# Patient Record
Sex: FEMALE | Race: WHITE | Employment: OTHER | URBAN - METROPOLITAN AREA
[De-identification: names, ages, dates, MRNs, and addresses within clinical notes are randomized per-mention and may not be internally consistent; named-entity substitution may affect disease eponyms.]

---

## 2017-03-20 ENCOUNTER — TRANSFERRED RECORDS (OUTPATIENT)
Dept: HEALTH INFORMATION MANAGEMENT | Facility: CLINIC | Age: 72
End: 2017-03-20

## 2017-03-25 DIAGNOSIS — I10 ESSENTIAL HYPERTENSION WITH GOAL BLOOD PRESSURE LESS THAN 140/90: ICD-10-CM

## 2017-03-28 ENCOUNTER — MYC MEDICAL ADVICE (OUTPATIENT)
Dept: FAMILY MEDICINE | Facility: CLINIC | Age: 72
End: 2017-03-28

## 2017-03-28 DIAGNOSIS — I10 ESSENTIAL HYPERTENSION WITH GOAL BLOOD PRESSURE LESS THAN 140/90: ICD-10-CM

## 2017-03-29 RX ORDER — LOSARTAN POTASSIUM 50 MG/1
50 TABLET ORAL DAILY
Qty: 90 TABLET | Refills: 0 | Status: SHIPPED | OUTPATIENT
Start: 2017-03-29 | End: 2017-07-06

## 2017-03-30 RX ORDER — LOSARTAN POTASSIUM 50 MG/1
TABLET ORAL
Qty: 90 TABLET | Refills: 1 | OUTPATIENT
Start: 2017-03-30

## 2017-03-30 NOTE — TELEPHONE ENCOUNTER
Refused back as it was filled through [employer] request.    Cely Osei RN, [facility] Triage

## 2017-04-04 RX ORDER — AMLODIPINE BESYLATE 5 MG/1
5 TABLET ORAL DAILY
Qty: 1 TABLET | Refills: 0 | COMMUNITY
Start: 2017-04-04 | End: 2017-08-14

## 2017-07-06 ENCOUNTER — MYC MEDICAL ADVICE (OUTPATIENT)
Dept: FAMILY MEDICINE | Facility: CLINIC | Age: 72
End: 2017-07-06

## 2017-07-06 DIAGNOSIS — I10 ESSENTIAL HYPERTENSION WITH GOAL BLOOD PRESSURE LESS THAN 140/90: ICD-10-CM

## 2017-07-06 NOTE — TELEPHONE ENCOUNTER
losartan (COZAAR) 50 MG tablet      Last Written Prescription Date: 3/29/17  Last Fill Quantity: 90, # refills: 0  Last Office Visit with G, P or [facility] prescribing provider: 11/8/16       Potassium   Date Value Ref Range Status   09/01/2016 3.9 3.4 - 5.3 mmol/L Final     Creatinine   Date Value Ref Range Status   09/01/2016 0.77 0.52 - 1.04 mg/dL Final     BP Readings from Last 3 Encounters:   11/08/16 126/70   09/08/16 124/82   04/07/16 128/74

## 2017-07-10 RX ORDER — LOSARTAN POTASSIUM 50 MG/1
50 TABLET ORAL DAILY
Qty: 30 TABLET | Refills: 0 | Status: SHIPPED | OUTPATIENT
Start: 2017-07-10 | End: 2017-08-14

## 2017-07-10 NOTE — TELEPHONE ENCOUNTER
Medication is being filled for 1 time refill only due to:  overdue for a wellness exam in March   Eileen Calderon RN

## 2017-08-04 ENCOUNTER — DOCUMENTATION ONLY (OUTPATIENT)
Dept: LAB | Facility: CLINIC | Age: 72
End: 2017-08-04

## 2017-08-04 DIAGNOSIS — E78.5 HYPERLIPIDEMIA LDL GOAL <100: ICD-10-CM

## 2017-08-04 DIAGNOSIS — D50.9 IRON DEFICIENCY ANEMIA, UNSPECIFIED IRON DEFICIENCY ANEMIA TYPE: ICD-10-CM

## 2017-08-04 DIAGNOSIS — I10 HYPERTENSION GOAL BP (BLOOD PRESSURE) < 140/90: ICD-10-CM

## 2017-08-04 DIAGNOSIS — R79.89 ELEVATED TSH: Primary | ICD-10-CM

## 2017-08-08 DIAGNOSIS — D50.9 IRON DEFICIENCY ANEMIA, UNSPECIFIED IRON DEFICIENCY ANEMIA TYPE: ICD-10-CM

## 2017-08-08 DIAGNOSIS — E78.5 HYPERLIPIDEMIA LDL GOAL <100: ICD-10-CM

## 2017-08-08 DIAGNOSIS — R79.89 ELEVATED TSH: ICD-10-CM

## 2017-08-08 DIAGNOSIS — I10 HYPERTENSION GOAL BP (BLOOD PRESSURE) < 140/90: ICD-10-CM

## 2017-08-08 LAB
ALBUMIN SERPL-MCNC: 4.1 G/DL (ref 3.4–5)
ALBUMIN UR-MCNC: NEGATIVE MG/DL
ALP SERPL-CCNC: 52 U/L (ref 40–150)
ALT SERPL W P-5'-P-CCNC: 40 U/L (ref 0–50)
ANION GAP SERPL CALCULATED.3IONS-SCNC: 9 MMOL/L (ref 3–14)
APPEARANCE UR: CLEAR
AST SERPL W P-5'-P-CCNC: 23 U/L (ref 0–45)
BACTERIA #/AREA URNS HPF: ABNORMAL /HPF
BILIRUB SERPL-MCNC: 0.6 MG/DL (ref 0.2–1.3)
BILIRUB UR QL STRIP: NEGATIVE
BUN SERPL-MCNC: 15 MG/DL (ref 7–30)
CALCIUM SERPL-MCNC: 9.3 MG/DL (ref 8.5–10.1)
CHLORIDE SERPL-SCNC: 108 MMOL/L (ref 94–109)
CHOLEST SERPL-MCNC: 152 MG/DL
CO2 SERPL-SCNC: 23 MMOL/L (ref 20–32)
COLOR UR AUTO: YELLOW
CREAT SERPL-MCNC: 0.79 MG/DL (ref 0.52–1.04)
CREAT UR-MCNC: 106 MG/DL
ERYTHROCYTE [DISTWIDTH] IN BLOOD BY AUTOMATED COUNT: 14.6 % (ref 10–15)
GFR SERPL CREATININE-BSD FRML MDRD: 72 ML/MIN/1.7M2
GLUCOSE SERPL-MCNC: 110 MG/DL (ref 70–99)
GLUCOSE UR STRIP-MCNC: NEGATIVE MG/DL
HCT VFR BLD AUTO: 40.2 % (ref 35–47)
HDLC SERPL-MCNC: 55 MG/DL
HGB BLD-MCNC: 13.5 G/DL (ref 11.7–15.7)
HGB UR QL STRIP: NEGATIVE
KETONES UR STRIP-MCNC: NEGATIVE MG/DL
LDLC SERPL CALC-MCNC: 72 MG/DL
LEUKOCYTE ESTERASE UR QL STRIP: ABNORMAL
MCH RBC QN AUTO: 29.4 PG (ref 26.5–33)
MCHC RBC AUTO-ENTMCNC: 33.6 G/DL (ref 31.5–36.5)
MCV RBC AUTO: 88 FL (ref 78–100)
MICROALBUMIN UR-MCNC: 15 MG/L
MICROALBUMIN/CREAT UR: 14.53 MG/G CR (ref 0–25)
NITRATE UR QL: NEGATIVE
NONHDLC SERPL-MCNC: 97 MG/DL
PH UR STRIP: 5.5 PH (ref 5–7)
PLATELET # BLD AUTO: 225 10E9/L (ref 150–450)
POTASSIUM SERPL-SCNC: 3.9 MMOL/L (ref 3.4–5.3)
PROT SERPL-MCNC: 7 G/DL (ref 6.8–8.8)
RBC # BLD AUTO: 4.59 10E12/L (ref 3.8–5.2)
RBC #/AREA URNS AUTO: ABNORMAL /HPF (ref 0–2)
SODIUM SERPL-SCNC: 140 MMOL/L (ref 133–144)
SP GR UR STRIP: 1.01 (ref 1–1.03)
T4 FREE SERPL-MCNC: 1.09 NG/DL (ref 0.76–1.46)
TRIGL SERPL-MCNC: 124 MG/DL
TSH SERPL DL<=0.005 MIU/L-ACNC: 4.48 MU/L (ref 0.4–4)
URN SPEC COLLECT METH UR: ABNORMAL
UROBILINOGEN UR STRIP-ACNC: 0.2 EU/DL (ref 0.2–1)
WBC # BLD AUTO: 5.2 10E9/L (ref 4–11)
WBC #/AREA URNS AUTO: ABNORMAL /HPF (ref 0–2)

## 2017-08-08 PROCEDURE — 85027 COMPLETE CBC AUTOMATED: CPT | Performed by: FAMILY MEDICINE

## 2017-08-08 PROCEDURE — 82043 UR ALBUMIN QUANTITATIVE: CPT | Performed by: FAMILY MEDICINE

## 2017-08-08 PROCEDURE — 80061 LIPID PANEL: CPT | Performed by: FAMILY MEDICINE

## 2017-08-08 PROCEDURE — 84439 ASSAY OF FREE THYROXINE: CPT | Performed by: FAMILY MEDICINE

## 2017-08-08 PROCEDURE — 36415 COLL VENOUS BLD VENIPUNCTURE: CPT | Performed by: FAMILY MEDICINE

## 2017-08-08 PROCEDURE — 84443 ASSAY THYROID STIM HORMONE: CPT | Performed by: FAMILY MEDICINE

## 2017-08-08 PROCEDURE — 80053 COMPREHEN METABOLIC PANEL: CPT | Performed by: FAMILY MEDICINE

## 2017-08-08 PROCEDURE — 81001 URINALYSIS AUTO W/SCOPE: CPT | Performed by: FAMILY MEDICINE

## 2017-08-09 ENCOUNTER — MYC MEDICAL ADVICE (OUTPATIENT)
Dept: FAMILY MEDICINE | Facility: CLINIC | Age: 72
End: 2017-08-09

## 2017-08-10 NOTE — PROGRESS NOTES
"Your urine testing does not show any elevated protein.  There are small changes that can be a sign of early infection.  Please send me a message if you are having symptoms.  If not we will plan a culture when you are in for your appointment.  Your thyroid testing is similar to back in 2014 which shows possible early underactive thyroid.  Recheck of this with upcoming appointment is recommended.  Your blood sugar is borderline elevated.  This is in the \"prediabetic\" range.  Exercise and limiting carbohydrate and sugars in diet can be helpful at preventing progression to diabetes.  Your kidney function is normal.   Liver testing is normal.  Your cholesterol is under good control.   Blood cell counts are normal as well.  Please call or MyChart message me if you have any questions.    PSK"

## 2017-08-14 ENCOUNTER — OFFICE VISIT (OUTPATIENT)
Dept: FAMILY MEDICINE | Facility: CLINIC | Age: 72
End: 2017-08-14
Payer: COMMERCIAL

## 2017-08-14 VITALS
DIASTOLIC BLOOD PRESSURE: 78 MMHG | HEART RATE: 71 BPM | BODY MASS INDEX: 40.06 KG/M2 | TEMPERATURE: 98.1 F | HEIGHT: 56 IN | OXYGEN SATURATION: 96 % | WEIGHT: 178.1 LBS | SYSTOLIC BLOOD PRESSURE: 128 MMHG

## 2017-08-14 DIAGNOSIS — E78.5 HYPERLIPIDEMIA LDL GOAL <100: ICD-10-CM

## 2017-08-14 DIAGNOSIS — Z23 NEED FOR TDAP VACCINATION: ICD-10-CM

## 2017-08-14 DIAGNOSIS — Z00.00 MEDICARE ANNUAL WELLNESS VISIT, SUBSEQUENT: Primary | ICD-10-CM

## 2017-08-14 DIAGNOSIS — B37.2 CANDIDAL INTERTRIGO: ICD-10-CM

## 2017-08-14 DIAGNOSIS — Z12.83 SKIN CANCER SCREENING: ICD-10-CM

## 2017-08-14 DIAGNOSIS — E66.01 MORBID OBESITY DUE TO EXCESS CALORIES (H): ICD-10-CM

## 2017-08-14 DIAGNOSIS — I10 HYPERTENSION GOAL BP (BLOOD PRESSURE) < 140/90: ICD-10-CM

## 2017-08-14 PROCEDURE — 90471 IMMUNIZATION ADMIN: CPT | Performed by: FAMILY MEDICINE

## 2017-08-14 PROCEDURE — 90715 TDAP VACCINE 7 YRS/> IM: CPT | Performed by: FAMILY MEDICINE

## 2017-08-14 PROCEDURE — 99397 PER PM REEVAL EST PAT 65+ YR: CPT | Mod: 25 | Performed by: FAMILY MEDICINE

## 2017-08-14 RX ORDER — SIMVASTATIN 40 MG
40 TABLET ORAL AT BEDTIME
Qty: 90 TABLET | Refills: 3 | Status: SHIPPED | OUTPATIENT
Start: 2017-08-14 | End: 2018-08-23

## 2017-08-14 RX ORDER — LOSARTAN POTASSIUM 50 MG/1
50 TABLET ORAL DAILY
Qty: 90 TABLET | Refills: 1 | Status: SHIPPED | OUTPATIENT
Start: 2017-08-14 | End: 2017-08-23

## 2017-08-14 RX ORDER — AMLODIPINE BESYLATE 5 MG/1
5 TABLET ORAL DAILY
Qty: 90 TABLET | Refills: 1 | Status: SHIPPED | OUTPATIENT
Start: 2017-08-14 | End: 2017-08-23

## 2017-08-14 ASSESSMENT — PAIN SCALES - GENERAL: PAINLEVEL: NO PAIN (0)

## 2017-08-14 NOTE — PROGRESS NOTES
SUBJECTIVE:   Monica Samayoa is a 71 year old female who presents for Preventive Visit.      Are you in the first 12 months of your Medicare Part B coverage?  No    Healthy Habits:    Do you get at least three servings of calcium containing foods daily (dairy, green leafy vegetables, etc.)? yes    Amount of exercise or daily activities, outside of work: 3-4 day(s) per week  Walking      Problems taking medications regularly No    Medication side effects: No    Have you had an eye exam in the past two years? yes    Do you see a dentist twice per year? yes    Do you have sleep apnea, excessive snoring or daytime drowsiness?no    COGNITIVE SCREEN  1) Repeat 3 items (Banana, Sunrise, Chair)    2) Clock draw: NORMAL  3) 3 item recall: Recalls 3 objects  Results: 3 items recalled: COGNITIVE IMPAIRMENT LESS LIKELY    Mini-CogTM Copyright S Mark. Licensed by the author for use in [facility] [employer]; reprinted with permission (celine@[facility]). All rights reserved.          Allergy - seasonal - restarted allergy shots - improved.  Dr. Murillo.      Hyperlipidemia Follow-Up      Rate your low fat/cholesterol diet?: good    Taking statin?  Yes, no muscle aches from statin    Other lipid medications/supplements?:  none    Hypertension Follow-up      Outpatient blood pressures are not being checked.    Low Salt Diet: no added salt            Reviewed and updated as needed this visit by clinical staffTobacco  Allergies  Meds  Med Hx  Surg Hx  Fam Hx  Soc Hx        Reviewed and updated as needed this visit by Provider  Tobacco  Allergies  Meds  Med Hx  Surg Hx  Fam Hx  Soc Hx       Social History   Substance Use Topics     Smoking status: Never Smoker     Smokeless tobacco: Never Used     Alcohol use Yes      Comment: 1-2  glasses of wine a week       The patient does not drink >3 drinks per day nor >7 drinks per week.    Today's PHQ-2 Score:   PHQ-2 ( 1999 Pfizer) 8/14/2017 9/5/2016   Q1: Little interest or  pleasure in doing things 0 -   Q2: Feeling down, depressed or hopeless 0 -   PHQ-2 Score 0 -   Q1: Little interest or pleasure in doing things - Several days   Q2: Feeling down, depressed or hopeless - Several days   PHQ-2 Score - 2         Do you feel safe in your environment - Yes    Do you have a Health Care Directive?: Yes: Advance Directive has been received and scanned.    Current providers sharing in care for this patient include: Patient Care Team:  Heather Adams MD as PCP - General (Family Practice)      Hearing impairment: Yes, possible  Fallen 2 or more times in the past year?: No  Any fall with injury in the past year?: No    Ability to successfully perform activities of daily living: Yes, no assistance needed     Fall risk:  Assessments    Assessment Instrument FALL RISK ASSESSMENT     Fall Risk Assessment   Fallen 2 or more times in the past year? No     Any fall with injury in the past year? No         Home safety:  none identified      The following health maintenance items are reviewed in Epic and correct as of today:  Health Maintenance   Topic Date Due     ADVANCE DIRECTIVE PLANNING Q5 YRS  07/12/2016     FALL RISK ASSESSMENT  09/08/2017     INFLUENZA VACCINE (SYSTEM ASSIGNED)  09/01/2017     MAMMO SCREEN Q2 YR (SYSTEM ASSIGNED)  07/14/2018     TETANUS IMMUNIZATION (SYSTEM ASSIGNED)  07/15/2020     LIPID SCREEN Q5 YR FEMALE (SYSTEM ASSIGNED)  08/08/2022     COLONOSCOPY Q10 YR  05/24/2023     DEXA SCAN SCREENING (SYSTEM ASSIGNED)  Completed     PNEUMOCOCCAL  Completed     HEPATITIS C SCREENING  Completed     BP Readings from Last 3 Encounters:   08/14/17 128/78   11/08/16 126/70   09/08/16 124/82    Wt Readings from Last 3 Encounters:   08/14/17 80.8 kg (178 lb 1.6 oz)   11/08/16 79.8 kg (176 lb)   09/08/16 79.1 kg (174 lb 6.4 oz)                      Mammogram Screening: Patient over age 50, mutual decision to screen reflected in health maintenance.      ROS:  10 point ROS of systems including  "Constitutional, Eyes, Respiratory, Cardiovascular, Gastroenterology, Genitourinary, Integumentary, Muscularskeletal, Psychiatric were all negative except for pertinent positives noted in my HPI.  Episodic redness with irritation under breasts - treated with nystatin powder prn with good results    OBJECTIVE:   /78 (BP Location: Right arm, Patient Position: Chair, Cuff Size: Adult Regular)  Pulse 71  Temp 98.1  F (36.7  C) (Oral)  Ht 1.431 m (4' 8.34\")  Wt 80.8 kg (178 lb 1.6 oz)  SpO2 96%  Breastfeeding? No  BMI 39.45 kg/m2 Estimated body mass index is 39.45 kg/(m^2) as calculated from the following:    Height as of this encounter: 1.431 m (4' 8.34\").    Weight as of this encounter: 80.8 kg (178 lb 1.6 oz).  EXAM:   GENERAL APPEARANCE: alert, no distress and morbidly obese  EYES: Eyes grossly normal to inspection, PERRL and conjunctivae and sclerae normal  HENT: ear canals and TM's normal, nose and mouth without ulcers or lesions, oropharynx clear and oral mucous membranes moist  NECK: no adenopathy, no asymmetry, masses, or scars and thyroid normal to palpation  RESP: lungs clear to auscultation - no rales, rhonchi or wheezes  BREAST: normal without masses, tenderness or nipple discharge and no palpable axillary masses or adenopathy  CV: regular rate and rhythm, normal S1 S2, no S3 or S4, no murmur, click or rub, no peripheral edema and peripheral pulses strong  ABDOMEN: soft, nontender, no hepatosplenomegaly, no masses and bowel sounds normal  MS: no musculoskeletal defects are noted and gait is age appropriate without ataxia  SKIN: no suspicious lesions or rashes and hair quality thin, balding on top.  NEURO: Normal strength and tone, sensory exam grossly normal, mentation intact and speech normal  PSYCH: mentation appears normal and affect normal/bright      Labs previously completed:  Your urine testing does not show any elevated protein.  There are small changes that can be a sign of early " "infection.  Please send me a message if you are having symptoms.  If not we will plan a culture when you are in for your appointment.   Your thyroid testing is similar to back in 2014 which shows possible early underactive thyroid.  Recheck of this with upcoming appointment is recommended.   Your blood sugar is borderline elevated.  This is in the \"prediabetic\" range.  Exercise and limiting carbohydrate and sugars in diet can be helpful at preventing progression to diabetes.   Your kidney function is normal.   Liver testing is normal.   Your cholesterol is under good control.   Blood cell counts are normal as well.   Please call or MyChart message me if you have any questions.         ASSESSMENT / PLAN:   1. Medicare annual wellness visit, subsequent  Screenings up to date.      2. Hyperlipidemia LDL goal <100  Refill.  - simvastatin (ZOCOR) 40 MG tablet; Take 1 tablet (40 mg) by mouth At Bedtime  Dispense: 90 tablet; Refill: 3    3. Hypertension goal BP (blood pressure) < 140/90  Controlled.  Refills.  - losartan (COZAAR) 50 MG tablet; Take 1 tablet (50 mg) by mouth daily  Dispense: 90 tablet; Refill: 1  - amLODIPine (NORVASC) 5 MG tablet; Take 1 tablet (5 mg) by mouth daily  Dispense: 90 tablet; Refill: 1    4. Morbid obesity due to excess calories (H)  - comorbid conditions.  Dietary measures     5. Need for Tdap vaccination  - TDAP VACCINE (ADACEL)  - ADMIN 1st VACCINE    6. Skin cancer screening  Requesting full body skin exam with derm.    - DERMATOLOGY REFERRAL    7.  Yeast skin under breast - controlled now. Requesting nystatin for prn use.  Refill sent.   End of Life Planning:  Patient currently has an advanced directive: Yes.  Practitioner is supportive of decision.    COUNSELING:  Reviewed preventive health counseling, as reflected in patient instructions  Special attention given to:       Regular exercise       Healthy diet/nutrition       Vision screening       Dental care       " "Immunizations    Vaccinated for: TDAP           Osteoporosis Prevention/Bone Health       Colon cancer screening          Estimated body mass index is 39.45 kg/(m^2) as calculated from the following:    Height as of this encounter: 1.431 m (4' 8.34\").    Weight as of this encounter: 80.8 kg (178 lb 1.6 oz).  Weight management plan: Discussed healthy diet and exercise guidelines and patient will follow up in 12 months in clinic to re-evaluate.   reports that she has never smoked. She has never used smokeless tobacco.        Appropriate preventive services were discussed with this patient, including applicable screening as appropriate for cardiovascular disease, diabetes, osteopenia/osteoporosis, and glaucoma.  As appropriate for age/gender, discussed screening for colorectal cancer, prostate cancer, breast cancer, and cervical cancer. Checklist reviewing preventive services available has been given to the patient.    Reviewed patients plan of care and provided an AVS. The Basic Care Plan (routine screening as documented in Health Maintenance) for Monica meets the Care Plan requirement. This Care Plan has been established and reviewed with the Patient.    Counseling Resources:  ATP IV Guidelines  Pooled Cohorts Equation Calculator  Breast Cancer Risk Calculator  FRAX Risk Assessment  ICSI Preventive Guidelines  Dietary Guidelines for Americans, 2010  USDA's MyPlate  ASA Prophylaxis  Lung CA Screening    Heather Adams MD  [facility]  Patient Instructions   Refill medications today.    Referral to dermatology Dr. Trotter-  You can call [phone] to schedule this at the [facility] in [place] (formerly the [facility]).    "

## 2017-08-14 NOTE — PATIENT INSTRUCTIONS
Refill medications today.    Referral to dermatology Dr. Trotter-  You can call [phone] to schedule this at the [facility] in [place] (formerly the [facility]).    Preventive Health Recommendations  Female Ages 65 +    Yearly exam:     See your health care provider every year in order to  o Review health changes.   o Discuss preventive care.    o Review your medicines if your doctor has prescribed any.      You no longer need a yearly Pap test unless you've had an abnormal Pap test in the past 10 years. If you have vaginal symptoms, such as bleeding or discharge, be sure to talk with your provider about a Pap test.      Every 1 to 2 years, have a mammogram.  If you are over 69, talk with your health care provider about whether or not you want to continue having screening mammograms.      Every 10 years, have a colonoscopy. Or, have a yearly FIT test (stool test). These exams will check for colon cancer.       Have a cholesterol test every 5 years, or more often if your doctor advises it.       Have a diabetes test (fasting glucose) every three years. If you are at risk for diabetes, you should have this test more often.       At age 65, have a bone density scan (DEXA) to check for osteoporosis (brittle bone disease).    Shots:    Get a flu shot each year.    Get a tetanus shot every 10 years.    Talk to your doctor about your pneumonia vaccines. There are now two you should receive - Pneumovax (PPSV 23) and Prevnar (PCV 13).    Talk to your doctor about the shingles vaccine.    Talk to your doctor about the hepatitis B vaccine.    Nutrition:     Eat at least 5 servings of fruits and vegetables each day.      Eat whole-grain bread, whole-wheat pasta and brown rice instead of white grains and rice.      Talk to your provider about Calcium and Vitamin D.     Lifestyle    Exercise at least 150 minutes a week (30 minutes a day, 5 days a week). This will help you control your  weight and prevent disease.      Limit alcohol to one drink per day.      No smoking.       Wear sunscreen to prevent skin cancer.       See your dentist twice a year for an exam and cleaning.      See your eye doctor every 1 to 2 years to screen for conditions such as glaucoma, macular degeneration, cataracts, etc

## 2017-08-14 NOTE — NURSING NOTE
"No chief complaint on file.      Initial /78 (BP Location: Right arm, Patient Position: Chair, Cuff Size: Adult Regular)  Pulse 71  Temp 98.1  F (36.7  C) (Oral)  Ht 1.431 m (4' 8.34\")  Wt 80.8 kg (178 lb 1.6 oz)  SpO2 96%  Breastfeeding? No  BMI 39.45 kg/m2 Estimated body mass index is 39.45 kg/(m^2) as calculated from the following:    Height as of this encounter: 1.431 m (4' 8.34\").    Weight as of this encounter: 80.8 kg (178 lb 1.6 oz).  Medication Reconciliation: complete     BAMBI Pineda MA      "

## 2017-08-17 PROBLEM — E66.01 MORBID OBESITY (H): Status: ACTIVE | Noted: 2017-08-17

## 2017-08-17 RX ORDER — NYSTATIN 100000 [USP'U]/G
POWDER TOPICAL
Qty: 60 G | Refills: 5 | Status: SHIPPED | OUTPATIENT
Start: 2017-08-17 | End: 2018-08-25

## 2017-08-22 ENCOUNTER — MYC MEDICAL ADVICE (OUTPATIENT)
Dept: FAMILY MEDICINE | Facility: CLINIC | Age: 72
End: 2017-08-22

## 2017-08-22 DIAGNOSIS — I10 HYPERTENSION GOAL BP (BLOOD PRESSURE) < 140/90: ICD-10-CM

## 2017-08-23 RX ORDER — AMLODIPINE BESYLATE 5 MG/1
5 TABLET ORAL DAILY
Qty: 90 TABLET | Refills: 1 | Status: SHIPPED | OUTPATIENT
Start: 2017-08-23 | End: 2018-08-23 | Stop reason: ALTCHOICE

## 2017-08-23 RX ORDER — LOSARTAN POTASSIUM 50 MG/1
50 TABLET ORAL DAILY
Qty: 90 TABLET | Refills: 1 | Status: SHIPPED | OUTPATIENT
Start: 2017-08-23 | End: 2018-08-23

## 2017-09-05 ENCOUNTER — MYC MEDICAL ADVICE (OUTPATIENT)
Dept: FAMILY MEDICINE | Facility: CLINIC | Age: 72
End: 2017-09-05

## 2017-09-11 ENCOUNTER — TRANSFERRED RECORDS (OUTPATIENT)
Dept: HEALTH INFORMATION MANAGEMENT | Facility: CLINIC | Age: 72
End: 2017-09-11

## 2017-09-22 ENCOUNTER — RADIANT APPOINTMENT (OUTPATIENT)
Dept: GENERAL RADIOLOGY | Facility: CLINIC | Age: 72
End: 2017-09-22
Attending: NURSE PRACTITIONER
Payer: COMMERCIAL

## 2017-09-22 ENCOUNTER — OFFICE VISIT (OUTPATIENT)
Dept: FAMILY MEDICINE | Facility: CLINIC | Age: 72
End: 2017-09-22
Payer: COMMERCIAL

## 2017-09-22 VITALS
SYSTOLIC BLOOD PRESSURE: 132 MMHG | HEIGHT: 56 IN | OXYGEN SATURATION: 96 % | BODY MASS INDEX: 39.95 KG/M2 | TEMPERATURE: 98.3 F | DIASTOLIC BLOOD PRESSURE: 78 MMHG | HEART RATE: 71 BPM | WEIGHT: 177.6 LBS

## 2017-09-22 DIAGNOSIS — M25.511 ACUTE PAIN OF RIGHT SHOULDER: Primary | ICD-10-CM

## 2017-09-22 PROCEDURE — 73030 X-RAY EXAM OF SHOULDER: CPT | Mod: RT

## 2017-09-22 PROCEDURE — 99213 OFFICE O/P EST LOW 20 MIN: CPT | Performed by: NURSE PRACTITIONER

## 2017-09-22 ASSESSMENT — PAIN SCALES - GENERAL: PAINLEVEL: NO PAIN (0)

## 2017-09-22 NOTE — MR AVS SNAPSHOT
After Visit Summary   9/22/2017    Monica Samayoa    MRN: [id number]           Patient Information     Date Of Birth          1945        Visit Information        Provider Department      9/22/2017 1:40 PM Francie Vyas NP [facility]        Today's Diagnoses     Acute pain of right shoulder    -  1       Follow-ups after your visit        Additional Services     [facility] PT, HAND, AND CHIROPRACTIC REFERRAL       **This order will print in the [facility] Scheduling Office**    Physical Therapy, Hand Therapy and Chiropractic Care are available through:    *[facility]  *[facility]  *[facility]    Call one number to schedule at any of the above locations: [phone].    Your provider has referred you to: Physical Therapy at [facility] or [facility]    Indication/Reason for Referral: Shoulder Pain  Onset of Illness: 4-5 weeks ago  Therapy Orders: Evaluate and Treat  Special Programs: None  Special Request: None    Ramon Navarro      Additional Comments for the Therapist or Chiropractor: n/a    Please be aware that coverage of these services is subject to the terms and limitations of your health insurance plan.  Call member services at your health plan with any benefit or coverage questions.      Please bring the following to your appointment:    *Your personal calendar for scheduling future appointments  *Comfortable clothing                  Who to contact     If you have questions or need follow up information about today's clinic visit or your schedule please contact [facility] directly at [phone].  Normal or non-critical lab and imaging results will be communicated to you by MyChart, letter or phone within 4 business days after the clinic has received the results. If you do not hear from us within 7 days, please contact the clinic through MyChart or phone. If you have a critical or abnormal lab result, we will notify you by phone  "as soon as possible.  Submit refill requests through [employer] or call your pharmacy and they will forward the refill request to us. Please allow 3 business days for your refill to be completed.          Additional Information About Your Visit        [employer] Information     [employer] gives you secure access to your electronic health record. If you see a primary care provider, you can also send messages to your care team and make appointments. If you have questions, please call your primary care clinic.  If you do not have a primary care provider, please call [phone] and they will assist you.        Care EveryWhere ID     This is your Care EveryWhere ID. This could be used by other organizations to access your [facility] medical records  [id number]        Your Vitals Were     Pulse Temperature Height Pulse Oximetry Breastfeeding? BMI (Body Mass Index)    71 98.3  F (36.8  C) (Oral) 1.421 m (4' 7.94\") 96% No 39.9 kg/m2       Blood Pressure from Last 3 Encounters:   09/22/17 132/78   08/14/17 128/78   11/08/16 126/70    Weight from Last 3 Encounters:   09/22/17 80.6 kg (177 lb 9.6 oz)   08/14/17 80.8 kg (178 lb 1.6 oz)   11/08/16 79.8 kg (176 lb)              We Performed the Following     [facility] PT, HAND, AND CHIROPRACTIC REFERRAL     XR Shoulder Right 2 Views        Primary Care Provider Office Phone # Fax #    Heather Adams [phone] [address]  [facility] [address]        Equal Access to Services     [facility] Hospital[facility] : Hadii aad ku hadasho Soomaali, waaxda luqadaha, qaybta kaalmada adeegyada, marly reyes. So [facility] [phone].    ATENCIÓN: Si habla español, tiene a chambers disposición servicios gratuitos de asistencia lingüística. Llame al [phone].    We comply with applicable federal civil rights laws and [place] laws. We do not discriminate on the basis of race, color, national origin, age, disability sex, sexual orientation or gender identity.       "      Thank you!     Thank you for choosing [facility]  for your care. Our goal is always to provide you with excellent care. Hearing back from our patients is one way we can continue to improve our services. Please take a few minutes to complete the written survey that you may receive in the mail after your visit with us. Thank you!             Your Updated Medication List - Protect others around you: Learn how to safely use, store and throw away your medicines at www.disposemymeds.org.          This list is accurate as of: 9/22/17  2:15 PM.  Always use your most recent med list.                   Brand Name Dispense Instructions for use Diagnosis    amLODIPine 5 MG tablet    NORVASC    90 tablet    Take 1 tablet (5 mg) by mouth daily    Hypertension goal BP (blood pressure) < 140/90       aspirin 81 MG tablet      Take  by mouth daily. *        calcium 500/D 500-200 MG-UNIT per tablet   Generic drug:  calcium carb 1250 mg (500 mg Crow)/vitamin D 200 unit      Take  by mouth.        CO Q-10 PLUS RED YEAST RICE PO      Take 100 mg by mouth daily.        FLONASE 50 MCG/ACT spray   Generic drug:  fluticasone      Spray 1-2 sprays into both nostrils daily    Chronic rhinitis       Krill Oil 500 MG Caps      Take  by mouth daily.        losartan 50 MG tablet    COZAAR    90 tablet    Take 1 tablet (50 mg) by mouth daily    Hypertension goal BP (blood pressure) < 140/90       Lutein 15-0.7 MG Caps      Take  by mouth.        LUTEIN PO      Take  by mouth daily. 25 mg        nystatin 328471 UNIT/GM Powd    MYCOSTATIN    60 g    Apply powder liberally to groin and under breasts TID with rash and once daily for prevention    Candidal intertrigo       OMEGA 3 PO      750 MG 1 daily        RA CETIRIZINE 10 MG tablet   Generic drug:  cetirizine      Take 10 mg by mouth Once Daily.        simvastatin 40 MG tablet    ZOCOR    90 tablet    Take 1 tablet (40 mg) by mouth At Bedtime    Hyperlipidemia LDL goal <100        vitamin B complex with vitamin C Tabs tablet      Take 1 Cap by mouth daily.        vitamin C 500 MG Chew      1 TABLET DAILY        vitamin D3 2000 UNITS Caps      Take  by mouth daily.

## 2017-09-22 NOTE — PROGRESS NOTES
SUBJECTIVE:   Monica Samayoa is a 72 year old female who presents to clinic today for the following health issues:      Joint Pain    Onset: August 11 2017    Description:   Location: right shoulder/upper arm  Character: Dull ache    Intensity: moderate, severe    Progression of Symptoms: worse    Accompanying Signs & Symptoms:  Other symptoms: none    History:   Previous similar pain: no       Precipitating factors:   Trauma or overuse: YES    Alleviating factors:  Improved by: nothing    Therapies Tried and outcome: none    Fell walking down a steep hill. On left arm had abrasions that have now healed. Right shoulder has been hurting since. Trouble with adducting arm and sometimes it feels very tight.  Is wondering about a cortisone shot or therapy. Tried Aleve at home, didn't help much. Did not want to use her narcotics she has left over.         Problem list and histories reviewed & adjusted, as indicated.  Additional history: as documented    Patient Active Problem List   Diagnosis     MARTIN (dyspnea on exertion)     Patient travels     Iron deficiency anemia     Hyperlipidemia LDL goal <100     Allergic state     Precancerous skin lesion     Invasive ductal carcinoma of breast, stage 1 (H)     Hypertension goal BP (blood pressure) < 140/90     Advanced directives, counseling/discussion     Watery eyes     Osteopenia     Candidal intertrigo     Elevated TSH     Geographic tongue     Glossitis     Vitamin D deficiency     Essential hypertension with goal blood pressure less than 140/90     Constipation, unspecified constipation type     Morbid obesity (H)     Past Surgical History:   Procedure Laterality Date     BIOPSY  11/18/2010    left breast cancer     BREAST SURGERY      lumpectomy     COLONOSCOPY  5/24/2013     ORTHOPEDIC SURGERY  1995    left ankle, fibula     REPAIR ECTROPION BILATERAL  8/15/12     REPAIR ECTROPION BILATERAL  7/17/2013    Procedure: REPAIR ECTROPION BILATERAL;  BILATERAL LOWER EYELID  ECTROPION REPAIR;  Surgeon: Neel Latham MD;  Location: [facility]     SURGICAL HISTORY OF -       left ankle pins       Social History   Substance Use Topics     Smoking status: Never Smoker     Smokeless tobacco: Never Used     Alcohol use Yes      Comment: 1-2  glasses of wine a week     Family History   Problem Relation Age of Onset     C.A.D. Father      Asthma Other      Breast Cancer Other      maternal aunt, paternal GM, aunt paternal     DIABETES Other      Uncle maternal     Breast Cancer Other      Asthma Other      OSTEOPOROSIS Maternal Grandmother      Cancer - colorectal No family hx of      Anesthesia Reaction No family hx of          Current Outpatient Prescriptions   Medication Sig Dispense Refill     losartan (COZAAR) 50 MG tablet Take 1 tablet (50 mg) by mouth daily 90 tablet 1     amLODIPine (NORVASC) 5 MG tablet Take 1 tablet (5 mg) by mouth daily 90 tablet 1     nystatin (MYCOSTATIN) 809756 UNIT/GM POWD Apply powder liberally to groin and under breasts TID with rash and once daily for prevention 60 g 5     simvastatin (ZOCOR) 40 MG tablet Take 1 tablet (40 mg) by mouth At Bedtime 90 tablet 3     vitamin  B complex with vitamin C (VITAMIN  B COMPLEX) TABS Take 1 Cap by mouth daily.       calcium carb 1250 mg, 500 mg [place],/vitamin D 200 unit (CALCIUM 500/D) 500-200 MG-UNIT per tablet Take  by mouth.       cetirizine (RA CETIRIZINE) 10 MG tablet Take 10 mg by mouth Once Daily.       Lutein 15-0.7 MG CAPS Take  by mouth.       fluticasone (FLONASE) 50 MCG/ACT nasal spray Spray 1-2 sprays into both nostrils daily       aspirin 81 MG tablet Take  by mouth daily. *       LUTEIN PO Take  by mouth daily. 25 mg       Cholecalciferol (VITAMIN D3) 2000 UNIT CAPS Take  by mouth daily.       Krill Oil 500 MG CAPS Take  by mouth daily.       VITAMIN C 500 MG PO CHEW 1 TABLET DAILY       OMEGA 3  MG 1 daily       CO Q-10 PLUS RED YEAST RICE PO Take 100 mg by mouth daily.       Allergies   Allergen  "Reactions     Seasonal Allergies Nausea     Congested   Pollen tree grass cats  Had shots in past         Reviewed and updated as needed this visit by clinical staffTobacco  Allergies  Meds  Problems  Med Hx  Surg Hx  Fam Hx  Soc Hx        Reviewed and updated as needed this visit by Provider  Allergies  Meds  Problems         ROS:  Constitutional, MS, systems are negative, except as otherwise noted.      OBJECTIVE:   /78 (BP Location: Right arm, Patient Position: Chair, Cuff Size: Adult Regular)  Pulse 71  Temp 98.3  F (36.8  C) (Oral)  Ht 1.421 m (4' 7.94\")  Wt 80.6 kg (177 lb 9.6 oz)  SpO2 96%  Breastfeeding? No  BMI 39.9 kg/m2  Body mass index is 39.9 kg/(m^2).  GENERAL: healthy, alert and no distress  MS: left arm and shoulder no gross musculoskeletal defects noted. Right shoulder pain in near the upper biceps muscle but not on the greater tubercle and pain with abduction. She is able to do full ROM on right shoulder but it does cause pain.  No pain with palpation of arm at rest. Strength equal both arms. No neuropathy either arm. No break in skin noted.     Diagnostic Test Results:  No results found for this or any previous visit (from the past 24 hour(s)).    ASSESSMENT/PLAN:         1. Acute pain of right shoulder  S/p fall 5 weeks ago. On-going pain of right shoulder. X-ray results pending  - XR Shoulder Right 2 Views    FUTURE APPOINTMENTS:       - Follow-up visit prn 4 weeks if PT is not improving pain    DINO Lopez, NP-C  [facility]    "

## 2017-09-25 ENCOUNTER — THERAPY VISIT (OUTPATIENT)
Dept: PHYSICAL THERAPY | Facility: CLINIC | Age: 72
End: 2017-09-25
Payer: COMMERCIAL

## 2017-09-25 ENCOUNTER — TELEPHONE (OUTPATIENT)
Dept: FAMILY MEDICINE | Facility: CLINIC | Age: 72
End: 2017-09-25

## 2017-09-25 DIAGNOSIS — M25.511 ACUTE PAIN OF RIGHT SHOULDER: Primary | ICD-10-CM

## 2017-09-25 PROCEDURE — 97110 THERAPEUTIC EXERCISES: CPT | Mod: GP | Performed by: PHYSICAL THERAPIST

## 2017-09-25 PROCEDURE — 97161 PT EVAL LOW COMPLEX 20 MIN: CPT | Mod: GP | Performed by: PHYSICAL THERAPIST

## 2017-09-25 NOTE — TELEPHONE ENCOUNTER
Notes Recorded by Francie Vyas NP on 9/25/2017 at 7:17 AM  Please call: Your x-ray of your right shoulder shows moderate age related degenerative changes-no fractures. Continue with physical therapy to improve your strength and mobility in your right shoulder.  DINO Lopez, NP-C    Patient was informed of test results and recommendations from provider. Patient verbalized understanding of all information given.   Hillary Basurto RN.

## 2017-09-25 NOTE — PROGRESS NOTES
Subjective:    Patient is a 72 year old female presenting with rehab right shoulder hpi.   Monica Samayoa is a 72 year old female with a right shoulder condition.  Condition occurred with:  A fall.  Condition occurred: in the community.  This is a new condition  Pt reports falling outside on 8/11/17, landing on R arm. Pt has continued to have increased pain laying on R shoulder or lifting/reaching arm out to side. Pt also has increased achy pain in R shoulder with prolonged inactivity. Pt had MD appointment on 9/22/17 and referred to PT. .    Patient reports pain:  Lateral.  Radiates to:  Upper arm.  Pain is described as aching and is intermittent and reported as 7/10.  Associated with: none. Pain is the same all the time (activity dependent).  Symptoms are exacerbated by lying on extremity, using arm at shoulder level, lifting and certain positions and relieved by analgesics and ice.  Since onset symptoms are unchanged.  Special tests:  X-ray.  Previous treatment: none.    General health as reported by patient is good.                  Barriers include:  None as reported by the patient.    Red flags:  None as reported by the patient.                        Objective:    System                   Shoulder Evaluation:  ROM:  AROM:    Flexion:  Left:  145 deg    Right:  145    Abduction:  Left: 145   Right:  135 +pain      External Rotation:  Left:  80    Right:  80          Flexion/External Rotation:  Left:  T2    Right:  T2  Extension/Internal Rotation:  Left:  T7    Right:  L1          Strength:    Flexion: Left:5/5   Pain:    Right: 4+/5     Pain:     Abduction:  Left: 5-/5  Pain:    Right: 4+/5     Pain:    Internal Rotation:  Left:5/5     Pain:    Right: 5/5     Pain:  External Rotation:   Left:5-/5     Pain:   Right:4+/5     Pain:        Elbow Flexion:  Left:5/5     Pain:    Right:5/5     Pain:  Elbow Extension:  Left:5/5     Pain:    Right:5/5     Pain:    Special Tests:      Right shoulder positive for the  following special tests:Impingement  Palpation:      Right shoulder tenderness present at: Upper Trap and Bicipital Groove                                     [facility]    [facility] Initial Evaluation    Assessment/Plan:      Patient is a 72 year old female with right side shoulder complaints.    Patient has the following significant findings with corresponding treatment plan.                Diagnosis 1:  R shoulder pain  Pain -  hot/cold therapy, manual therapy, self management, education and home program  Decreased ROM/flexibility - manual therapy, therapeutic exercise, therapeutic activity and home program  Decreased strength - therapeutic exercise, therapeutic activities and home program  Impaired muscle performance - neuro re-education and home program  Decreased function - therapeutic activities and home program  Impaired posture - neuro re-education, therapeutic activities and home program    Therapy Evaluation Codes:   1) History comprised of:   Personal factors that impact the plan of care:      None.    Comorbidity factors that impact the plan of care are:      Cancer, High blood pressure and Overweight.     Medications impacting care: High blood pressure.  2) Examination of Body Systems comprised of:   Body structures and functions that impact the plan of care:      Shoulder.   Activity limitations that impact the plan of care are:      Lifting, Sleeping and Laying down.  3) Clinical presentation characteristics are:   Stable/Uncomplicated.  4) Decision-Making    Low complexity using standardized patient assessment instrument and/or measureable assessment of functional outcome.  Cumulative Therapy Evaluation is: Low complexity.    Previous and current functional limitations:  (See Goal Flow Sheet for this information)    Short term and Long term goals: (See Goal Flow Sheet for this information)     Communication ability:  Patient appears to be able to clearly communicate and  understand verbal and written communication and follow directions correctly.  Treatment Explanation - The following has been discussed with the patient:   RX ordered/plan of care  Anticipated outcomes  Possible risks and side effects  This patient would benefit from PT intervention to resume normal activities.   Rehab potential is good.    Frequency:  1 X week, once daily  Duration:  for 8 weeks  Discharge Plan:  Achieve all LTG.  Independent in home treatment program.  Return to previous functional level by discharge.  Reach maximal therapeutic benefit.    Please refer to the daily flowsheet for treatment today, total treatment time and time spent performing 1:1 timed codes.

## 2017-09-26 NOTE — PROGRESS NOTES
Subjective:    Patient is a 72 year old female presenting with rehab left ankle/foot hpi.                                      Pertinent medical history includes:  Cancer, overweight and high blood pressure.  Medical allergies: no.  Other surgeries include:  Cancer surgery and orthopedic surgery.  Current medications:  High blood pressure medication.  Current occupation is Retired  .                                    Objective:    System    Physical Exam    General     ROS    Assessment/Plan:

## 2017-10-02 ENCOUNTER — THERAPY VISIT (OUTPATIENT)
Dept: PHYSICAL THERAPY | Facility: CLINIC | Age: 72
End: 2017-10-02
Payer: COMMERCIAL

## 2017-10-02 DIAGNOSIS — M25.511 ACUTE PAIN OF RIGHT SHOULDER: ICD-10-CM

## 2017-10-02 PROCEDURE — 97110 THERAPEUTIC EXERCISES: CPT | Mod: GP | Performed by: PHYSICAL THERAPIST

## 2017-10-02 PROCEDURE — 97140 MANUAL THERAPY 1/> REGIONS: CPT | Mod: GP | Performed by: PHYSICAL THERAPIST

## 2017-10-02 PROCEDURE — 97112 NEUROMUSCULAR REEDUCATION: CPT | Mod: GP | Performed by: PHYSICAL THERAPIST

## 2017-10-09 ENCOUNTER — THERAPY VISIT (OUTPATIENT)
Dept: PHYSICAL THERAPY | Facility: CLINIC | Age: 72
End: 2017-10-09
Payer: COMMERCIAL

## 2017-10-09 DIAGNOSIS — M25.511 ACUTE PAIN OF RIGHT SHOULDER: ICD-10-CM

## 2017-10-09 PROCEDURE — 97112 NEUROMUSCULAR REEDUCATION: CPT | Mod: GP | Performed by: PHYSICAL THERAPIST

## 2017-10-09 PROCEDURE — 97140 MANUAL THERAPY 1/> REGIONS: CPT | Mod: GP | Performed by: PHYSICAL THERAPIST

## 2017-10-09 PROCEDURE — 97110 THERAPEUTIC EXERCISES: CPT | Mod: GP | Performed by: PHYSICAL THERAPIST

## 2017-10-18 ENCOUNTER — THERAPY VISIT (OUTPATIENT)
Dept: PHYSICAL THERAPY | Facility: CLINIC | Age: 72
End: 2017-10-18
Payer: COMMERCIAL

## 2017-10-18 DIAGNOSIS — M25.511 ACUTE PAIN OF RIGHT SHOULDER: ICD-10-CM

## 2017-10-18 PROCEDURE — 97110 THERAPEUTIC EXERCISES: CPT | Mod: GP | Performed by: PHYSICAL THERAPIST

## 2017-10-18 PROCEDURE — 97140 MANUAL THERAPY 1/> REGIONS: CPT | Mod: GP | Performed by: PHYSICAL THERAPIST

## 2017-11-08 ENCOUNTER — ALLIED HEALTH/NURSE VISIT (OUTPATIENT)
Dept: NURSING | Facility: CLINIC | Age: 72
End: 2017-11-08
Payer: COMMERCIAL

## 2017-11-08 DIAGNOSIS — Z23 NEED FOR PROPHYLACTIC VACCINATION AND INOCULATION AGAINST INFLUENZA: Primary | ICD-10-CM

## 2017-11-08 PROCEDURE — 90662 IIV NO PRSV INCREASED AG IM: CPT

## 2017-11-08 PROCEDURE — 99207 ZZC NO CHARGE NURSE ONLY: CPT

## 2017-11-08 PROCEDURE — G0008 ADMIN INFLUENZA VIRUS VAC: HCPCS

## 2017-11-08 NOTE — PROGRESS NOTES

## 2017-11-08 NOTE — MR AVS SNAPSHOT
After Visit Summary   11/8/2017    Monica Samayoa    MRN: [id number]           Patient Information     Date Of Birth          1945        Visit Information        Provider Department      11/8/2017 1:20 PM [facility]        Today's Diagnoses     Need for prophylactic vaccination and inoculation against influenza    -  1       Follow-ups after your visit        Who to contact     If you have questions or need follow up information about today's clinic visit or your schedule please contact [facility] directly at [phone].  Normal or non-critical lab and imaging results will be communicated to you by Advanced Telemetry[employer], letter or phone within 4 business days after the clinic has received the results. If you do not hear from us within 7 days, please contact the clinic through [employer] or phone. If you have a critical or abnormal lab result, we will notify you by phone as soon as possible.  Submit refill requests through [employer] or call your pharmacy and they will forward the refill request to us. Please allow 3 business days for your refill to be completed.          Additional Information About Your Visit        MyChart Information     [employer] gives you secure access to your electronic health record. If you see a primary care provider, you can also send messages to your care team and make appointments. If you have questions, please call your primary care clinic.  If you do not have a primary care provider, please call [phone] and they will assist you.        Care EveryWhere ID     This is your Care EveryWhere ID. This could be used by other organizations to access your [facility] medical records  [id number]         Blood Pressure from Last 3 Encounters:   09/22/17 132/78   08/14/17 128/78   11/08/16 126/70    Weight from Last 3 Encounters:   09/22/17 80.6 kg (177 lb 9.6 oz)   08/14/17 80.8 kg (178 lb 1.6 oz)   11/08/16 79.8 kg (176 lb)              We Performed  the Following     FLU VACCINE, INCREASED ANTIGEN, PRESV FREE, AGE 65+ [99060]     Vaccine Administration, Initial [[id number]]        Primary Care Provider Office Phone # Fax #    Heather Adams [phone] [address]  [place] [address]        Equal Access to Services     [facility] : Hadii aad ku hadasho Soomaali, waaxda luqadaha, qaybta kaalmada adeegyada, waxay idiin hayaan adecesario aranda ladillonn . So [facility] [phone].    ATENCIÓN: Si habla español, tiene a chambers disposición servicios gratuitos de asistencia lingüística. [place] al [phone].    We comply with applicable federal civil rights laws and [place] laws. We do not discriminate on the basis of race, color, national origin, age, disability, sex, sexual orientation, or gender identity.            Thank you!     Thank you for choosing [facility]  for your care. Our goal is always to provide you with excellent care. Hearing back from our patients is one way we can continue to improve our services. Please take a few minutes to complete the written survey that you may receive in the mail after your visit with us. Thank you!             Your Updated Medication List - Protect others around you: Learn how to safely use, store and throw away your medicines at www.disposemymeds.org.          This list is accurate as of: 11/8/17  1:24 PM.  Always use your most recent med list.                   Brand Name Dispense Instructions for use Diagnosis    amLODIPine 5 MG tablet    NORVASC    90 tablet    Take 1 tablet (5 mg) by mouth daily    Hypertension goal BP (blood pressure) < 140/90       aspirin 81 MG tablet      Take  by mouth daily. *        calcium 500/D 500-200 MG-UNIT per tablet   Generic drug:  calcium carb 1250 mg (500 mg [place]vitamin D 200 unit      Take  by mouth.        CO Q-10 PLUS RED YEAST RICE PO      Take 100 mg by mouth daily.        FLONASE 50 MCG/ACT spray   Generic drug:  fluticasone      Spray 1-2  sprays into both nostrils daily    Chronic rhinitis       Krill Oil 500 MG Caps      Take  by mouth daily.        losartan 50 MG tablet    COZAAR    90 tablet    Take 1 tablet (50 mg) by mouth daily    Hypertension goal BP (blood pressure) < 140/90       Lutein 15-0.7 MG Caps      Take  by mouth.        LUTEIN PO      Take  by mouth daily. 25 mg        nystatin 201619 UNIT/GM Powd    MYCOSTATIN    60 g    Apply powder liberally to groin and under breasts TID with rash and once daily for prevention    Candidal intertrigo       OMEGA 3 PO      750 MG 1 daily        RA CETIRIZINE 10 MG tablet   Generic drug:  cetirizine      Take 10 mg by mouth Once Daily.        simvastatin 40 MG tablet    ZOCOR    90 tablet    Take 1 tablet (40 mg) by mouth At Bedtime    Hyperlipidemia LDL goal <100       vitamin B complex with vitamin C Tabs tablet      Take 1 Cap by mouth daily.        vitamin C 500 MG Chew      1 TABLET DAILY        vitamin D3 2000 UNITS Caps      Take  by mouth daily.

## 2017-11-30 PROBLEM — M25.511 ACUTE PAIN OF RIGHT SHOULDER: Status: RESOLVED | Noted: 2017-09-25 | Resolved: 2017-11-30

## 2017-11-30 NOTE — PROGRESS NOTES
Subjective:    HPI                    Objective:    System    Physical Exam    General     ROS    Assessment/Plan:      DISCHARGE REPORT    Progress reporting period is from 9/25/17 to 10/18/17.     SUBJECTIVE  Subjective: Monica noticing inconsistency in symptoms at this time. Has not been as consistent with HEP this past week. Pt noticing less pain overall reaching overhead.    Current Pain level: 3/10   Initial Pain level: 7/10   Changes in function: Yes, see goal flow sheet for change in function   Adverse reactions: None;   ,         OBJECTIVE  Objective: R shoulder AROM: flexion 145 deg -pain, abduction 140 deg +pain, ER 75 deg, EADIR T12      ASSESSMENT/PLAN  Updated problem list and treatment plan: Diagnosis 1:  R shoulder pain  Pain -  hot/cold therapy, manual therapy, self management, education and home program  Decreased ROM/flexibility - manual therapy, therapeutic exercise, therapeutic activity and home program  Decreased strength - therapeutic exercise, therapeutic activities and home program  Impaired muscle performance - neuro re-education and home program  Decreased function - therapeutic activities and home program  Impaired posture - neuro re-education, therapeutic activities and home program  STG/LTGs have been met or progress has been made towards goals:  Yes (See Goal flow sheet completed today.)  Assessment of Progress: The patient's condition is improving.  Self Management Plans:  Patient has been instructed in a home treatment program.  Patient is independent in a home treatment program.  Patient  has been instructed in self management of symptoms.  Patient is independent in self management of symptoms.  I have re-evaluated this patient and find that the nature, scope, duration and intensity of the therapy is appropriate for the medical condition of the patient.  Monica continues to require the following intervention to meet STG and LTG's: PT intervention is no longer required to meet  STG/LTG.  The patient failed to complete scheduled/ordered appointments so current information is unknown.  We will discharge this patient from PT.    Recommendations:  This patient is ready to be discharged from therapy and continue their home treatment program.    Please refer to the daily flowsheet for treatment today, total treatment time and time spent performing 1:1 timed codes.

## 2018-04-30 ENCOUNTER — TRANSFERRED RECORDS (OUTPATIENT)
Dept: HEALTH INFORMATION MANAGEMENT | Facility: CLINIC | Age: 73
End: 2018-04-30

## 2018-05-07 ENCOUNTER — ALLIED HEALTH/NURSE VISIT (OUTPATIENT)
Dept: NURSING | Facility: CLINIC | Age: 73
End: 2018-05-07
Payer: COMMERCIAL

## 2018-05-07 DIAGNOSIS — S61.219A LACERATION OF FINGER: Primary | ICD-10-CM

## 2018-05-07 PROCEDURE — 99207 ZZC NO CHARGE NURSE ONLY: CPT

## 2018-05-07 NOTE — NURSING NOTE
Writer notified that patient walked in with a laceration to her left forefinger. Patient had been across the street at a restaurant when she had accidentally cut her finger and a laceration occurred. She wrapped her finger in napkins and walked over with her  to [facility]. Patient was roomed and due to active bleeding of forefinger writer did not get patient vital signs due to emergence of bleeding. Patient was in good spirits and was able to communicate in clear, full sentences. Denies dizziness or lightheadedness. Due to severity of the laceration writer asked Dr. Wagoner to step in to room and determine whether patient could be treated here or if she required the services of a hospital. Due to nature of laceration and patient's report of numbness to the finger Dr. Wagoner advised writer to apply a compress wrap to patient's finger and have her sent to [facility] for further evaluation and treatment. The wound/laceration did not appear to have any foreign matter in the wound bed, active bleeding occurring, unable to see anything else of concern but also was not able to visualize as long due to persistent bleeding and needing to keep wound/laceration covered. Writer applied a sterile 3 x 3 gauze to laceration and applied a compress wrap using gauze/kerlix around the sterile gauze pad. Then writer applied a 4 x 4 gauze pad atop this, and wrapped around finger again with kerlix and taped in place. Patient was given an ABD pad in case bleeding increased and was saturating the wrapping on the way to the hospital. Before patient left, with  driving, to [facility], the wrapping around finger was intact, no saturation of blood noted anywhere on dressing, patient reported that it felt secure on her finger. Patient's hands were cleansed of dried blood. Patient left with dressing intact, extra ABD pad in case bleeding saturated dressing on the way to hospital, and was still  in good spirits, able to walk safely on her own, and denies confusion/dizziness/lightheadedness. Patient escorted out to lobby by writer and . Instructed to call clinic with any further questions or concerns. Patient is on her way to [facility] now.     Esperanza Archibald RN

## 2018-05-07 NOTE — MR AVS SNAPSHOT
After Visit Summary   5/7/2018    Monica Samayoa    MRN: [id number]           Patient Information     Date Of Birth          1945        Visit Information        Provider Department      5/7/2018 2:00 PM BA RN/NURSE ONLY [facility]        Today's Diagnoses     Laceration of finger    -  1       Follow-ups after your visit        Who to contact     If you have questions or need follow up information about today's clinic visit or your schedule please contact [facility] directly at [phone].  Normal or non-critical lab and imaging results will be communicated to you by [email or web address], letter or phone within 4 business days after the clinic has received the results. If you do not hear from us within 7 days, please contact the clinic through [email or web address] or phone. If you have a critical or abnormal lab result, we will notify you by phone as soon as possible.  Submit refill requests through EvaluAgent or call your pharmacy and they will forward the refill request to us. Please allow 3 business days for your refill to be completed.          Additional Information About Your Visit        MyChart Information     EvaluAgent gives you secure access to your electronic health record. If you see a primary care provider, you can also send messages to your care team and make appointments. If you have questions, please call your primary care clinic.  If you do not have a primary care provider, please call [phone] and they will assist you.        Care EveryWhere ID     This is your Care EveryWhere ID. This could be used by other organizations to access your [facility] medical records  [id number]         Blood Pressure from Last 3 Encounters:   09/22/17 132/78   08/14/17 128/78   11/08/16 126/70    Weight from Last 3 Encounters:   09/22/17 80.6 kg (177 lb 9.6 oz)   08/14/17 80.8 kg (178 lb 1.6 oz)   11/08/16 79.8 kg (176 lb)              Today, you had the following     No orders found for  display       Primary Care Provider Office Phone # Fax #    Heather Adams [phone] [address]  [facility] [address]        Equal Access to Services     OWENANDI LARRY : Hadii chung ku abidao Soarturali, waaxda luqadaha, qaybta kaalmada aristeo, marly lalgualberto reyes. So [facility] [phone].    ATENCIÓN: Si habla español, tiene a chambers disposición servicios gratuitos de asistencia lingüística. Llame al [phone].    We comply with applicable federal civil rights laws and [place] laws. We do not discriminate on the basis of race, color, national origin, age, disability, sex, sexual orientation, or gender identity.            Thank you!     Thank you for choosing [facility]  for your care. Our goal is always to provide you with excellent care. Hearing back from our patients is one way we can continue to improve our services. Please take a few minutes to complete the written survey that you may receive in the mail after your visit with us. Thank you!             Your Updated Medication List - Protect others around you: Learn how to safely use, store and throw away your medicines at www.disposemymeds.org.          This list is accurate as of 5/7/18  2:50 PM.  Always use your most recent med list.                   Brand Name Dispense Instructions for use Diagnosis    amLODIPine 5 MG tablet    NORVASC    90 tablet    Take 1 tablet (5 mg) by mouth daily    Hypertension goal BP (blood pressure) < 140/90       aspirin 81 MG tablet      Take  by mouth daily. *        calcium 500/D 500-200 MG-UNIT per tablet   Generic drug:  calcium carb 1250 mg (500 mg [place])/vitamin D 200 unit      Take  by mouth.        CO Q-10 PLUS RED YEAST RICE PO      Take 100 mg by mouth daily.        FLONASE 50 MCG/ACT spray   Generic drug:  fluticasone      Spray 1-2 sprays into both nostrils daily    Chronic rhinitis       Krill Oil 500 MG Caps      Take  by mouth daily.         losartan 50 MG tablet    COZAAR    90 tablet    Take 1 tablet (50 mg) by mouth daily    Hypertension goal BP (blood pressure) < 140/90       Lutein 15-0.7 MG Caps      Take  by mouth.        LUTEIN PO      Take  by mouth daily. 25 mg        nystatin 377801 UNIT/GM Powd    MYCOSTATIN    60 g    Apply powder liberally to groin and under breasts TID with rash and once daily for prevention    Candidal intertrigo       OMEGA 3 PO      750 MG 1 daily        RA CETIRIZINE 10 MG tablet   Generic drug:  cetirizine      Take 10 mg by mouth Once Daily.        simvastatin 40 MG tablet    ZOCOR    90 tablet    Take 1 tablet (40 mg) by mouth At Bedtime    Hyperlipidemia LDL goal <100       vitamin B complex with vitamin C Tabs tablet      Take 1 Cap by mouth daily.        vitamin C 500 MG Chew      1 TABLET DAILY        vitamin D3 2000 units Caps      Take  by mouth daily.

## 2018-06-28 ENCOUNTER — OFFICE VISIT (OUTPATIENT)
Dept: FAMILY MEDICINE | Facility: CLINIC | Age: 73
End: 2018-06-28
Payer: COMMERCIAL

## 2018-06-28 ENCOUNTER — RADIANT APPOINTMENT (OUTPATIENT)
Dept: GENERAL RADIOLOGY | Facility: CLINIC | Age: 73
End: 2018-06-28
Attending: PHYSICIAN ASSISTANT
Payer: COMMERCIAL

## 2018-06-28 VITALS
TEMPERATURE: 98.6 F | DIASTOLIC BLOOD PRESSURE: 78 MMHG | HEIGHT: 57 IN | OXYGEN SATURATION: 95 % | WEIGHT: 176 LBS | BODY MASS INDEX: 37.97 KG/M2 | RESPIRATION RATE: 19 BRPM | HEART RATE: 65 BPM | SYSTOLIC BLOOD PRESSURE: 128 MMHG

## 2018-06-28 DIAGNOSIS — C50.919 INFILTRATING DUCTAL CARCINOMA OF BREAST, STAGE 1, UNSPECIFIED LATERALITY (H): ICD-10-CM

## 2018-06-28 DIAGNOSIS — E66.01 MORBID OBESITY DUE TO EXCESS CALORIES (H): ICD-10-CM

## 2018-06-28 DIAGNOSIS — M79.645 PAIN OF LEFT MIDDLE FINGER: Primary | ICD-10-CM

## 2018-06-28 DIAGNOSIS — M79.645 PAIN OF LEFT MIDDLE FINGER: ICD-10-CM

## 2018-06-28 PROCEDURE — 99213 OFFICE O/P EST LOW 20 MIN: CPT | Performed by: PHYSICIAN ASSISTANT

## 2018-06-28 PROCEDURE — 73140 X-RAY EXAM OF FINGER(S): CPT | Mod: LT | Performed by: FAMILY MEDICINE

## 2018-06-28 ASSESSMENT — PAIN SCALES - GENERAL: PAINLEVEL: NO PAIN (0)

## 2018-06-28 NOTE — PATIENT INSTRUCTIONS
At [facility], we strive to deliver an exceptional experience to you, every time we see you.  If you receive a survey in the mail, please send us back your thoughts. We really do value your feedback.    Suggested websites for health information:  Www.[facility].org : Up to date and easily searchable information on multiple topics.  Www.medlineplus.gov : medication info, interactive tutorials, watch real surgeries online  Www.familydoctor.org : good info from the Academy of Family Physicians  Www.cdc.gov : public health info, travel advisories, epidemics (H1N1)  Www.aap.org : children's health info, normal development, vaccinations  Www.health.[facility].[place].us : [place] dept of health, public health issues in [place], N1N1    Your care team:     Family Medicine   JUAREZ Valentino MD Emily Bunt, DINO SPANGLER   S. MD Heather Brower MD Angela Wermerskirchen, MD         Clinic hours: Monday - Wednesday 7 am-7 pm   Thursdays and Fridays 7 am-5 pm.     [facility]: Monday - Friday 11 am-9 pm,   Saturday and Sunday 9 am-5 pm.    [facility]: Monday -Thursday 8 am-8 pm; Friday 8 am-6 pm; Saturday and Sunday 9 am-5 pm.     [facility]: Monday Thursday 8 am   7 pm; Friday 8 am   6 pm    Clinic: [phone]   [facility]: [phone]   [facility]: [phone]

## 2018-06-28 NOTE — PROGRESS NOTES
SUBJECTIVE:   Monica Samayoa is a 72 year old female who presents to clinic today for the following health issues:      Finger injury    Onset: May 7    Description:   Location: left middle finger  Character: no pain    Progression of Symptoms: improving    Accompanying Signs & Symptoms:  Other symptoms: sensitivity of new skin growth    History:   Previous similar pain: no       Precipitating factors:   Trauma or overuse: YES- smashed finger in between chairs at [facility]    Alleviating factors:  Improved by: was seen in ER and stitched , was told to follow up with an x-ray        5/7/18 got left middle finger stuck between two chairs at [facility] as reached under chair to try to move chair forward.  Had distal tuft fracture and wound repaired at [facility].   Was sent to hand doctor and hand doctor has requested repeat xray to evaluate for healing and to be placed on a disc for his review and comparison to previous  Patient reports that wound is healing but quite tender on palmar surface       Problem list and histories reviewed & adjusted, as indicated.  Additional history: as documented    Patient Active Problem List   Diagnosis     MARTIN (dyspnea on exertion)     Patient travels     Iron deficiency anemia     Hyperlipidemia LDL goal <100     Allergic state     Precancerous skin lesion     Invasive ductal carcinoma of breast, stage 1 (H)     Hypertension goal BP (blood pressure) < 140/90     Advanced directives, counseling/discussion     Watery eyes     Osteopenia     Candidal intertrigo     Elevated TSH     Geographic tongue     Glossitis     Vitamin D deficiency     Essential hypertension with goal blood pressure less than 140/90     Constipation, unspecified constipation type     Morbid obesity (H)     Past Surgical History:   Procedure Laterality Date     BIOPSY  11/18/2010    left breast cancer     BREAST SURGERY      lumpectomy     COLONOSCOPY  5/24/2013     ORTHOPEDIC SURGERY  1995    left  ankle, fibula     REPAIR ECTROPION BILATERAL  8/15/12     REPAIR ECTROPION BILATERAL  7/17/2013    Procedure: REPAIR ECTROPION BILATERAL;  BILATERAL LOWER EYELID ECTROPION REPAIR;  Surgeon: Neel Latham MD;  Location: [facility] [place]     SURGICAL HISTORY OF -       left ankle pins       Social History   Substance Use Topics     Smoking status: Never Smoker     Smokeless tobacco: Never Used     Alcohol use Yes      Comment: 1-2  glasses of wine a week     Family History   Problem Relation Age of Onset     C.A.D. Father      Asthma Other      Breast Cancer Other      maternal aunt, paternal GM, aunt paternal     Diabetes Other      Uncle maternal     Breast Cancer Other      Asthma Other      Osteoperosis Maternal Grandmother      Cancer - colorectal No family hx of      Anesthesia Reaction No family hx of          Current Outpatient Prescriptions   Medication Sig Dispense Refill     amLODIPine (NORVASC) 5 MG tablet Take 1 tablet (5 mg) by mouth daily 90 tablet 1     aspirin 81 MG tablet Take  by mouth daily. *       calcium carb 1250 mg, 500 mg [place],/vitamin D 200 unit (CALCIUM 500/D) 500-200 MG-UNIT per tablet Take  by mouth.       cetirizine (RA CETIRIZINE) 10 MG tablet Take 10 mg by mouth Once Daily.       Cholecalciferol (VITAMIN D3) 2000 UNIT CAPS Take  by mouth daily.       CO Q-10 PLUS RED YEAST RICE PO Take 100 mg by mouth daily.       fluticasone (FLONASE) 50 MCG/ACT nasal spray Spray 1-2 sprays into both nostrils daily       losartan (COZAAR) 50 MG tablet Take 1 tablet (50 mg) by mouth daily 90 tablet 1     Lutein 15-0.7 MG CAPS Take  by mouth.       LUTEIN PO Take  by mouth daily. 25 mg       nystatin (MYCOSTATIN) 843897 UNIT/GM POWD Apply powder liberally to groin and under breasts TID with rash and once daily for prevention 60 g 5     OMEGA 3  MG 1 daily       simvastatin (ZOCOR) 40 MG tablet Take 1 tablet (40 mg) by mouth At Bedtime 90 tablet 3     vitamin  B complex with vitamin C (VITAMIN  B COMPLEX)  "TABS Take 1 Cap by mouth daily.       VITAMIN C 500 MG PO CHEW 1 TABLET DAILY       Allergies   Allergen Reactions     Seasonal Allergies Nausea     Congested   Pollen tree grass cats  Had shots in past       Reviewed and updated as needed this visit by clinical staff  Tobacco  Allergies  Meds  Med Hx  Surg Hx  Fam Hx  Soc Hx      Reviewed and updated as needed this visit by Provider  Tobacco  Allergies  Meds  Problems  Med Hx  Surg Hx  Fam Hx  Soc Hx          ROS:  Constitutional, HEENT, cardiovascular, pulmonary, gi and gu systems are negative, except as otherwise noted.    OBJECTIVE:     /78 (BP Location: Right arm, Patient Position: Chair, Cuff Size: Adult Large)  Pulse 65  Temp 98.6  F (37  C) (Oral)  Resp 19  Ht 1.441 m (4' 8.75\")  Wt 79.8 kg (176 lb)  SpO2 95%  Breastfeeding? No  BMI 38.42 kg/m2  Body mass index is 38.42 kg/(m^2).  GENERAL: alert, no distress and obese  MS: left middle finger is bandaged.  Has normal range of motion at metacarpal phalangeal joint, pip and dip quite tender distal phalanx especially palmar surface    Diagnostic Test Results:  Xray with distal tuft fracture- unable to compare with previous film since outside of [place]    ASSESSMENT/PLAN:         BMI:   Estimated body mass index is 38.42 kg/(m^2) as calculated from the following:    Height as of this encounter: 1.441 m (4' 8.75\").    Weight as of this encounter: 79.8 kg (176 lb).   Weight management plan: Discussed healthy diet and exercise guidelines and patient will follow up in 1 month in clinic to re-evaluate.      1. Pain of left middle finger  History of significant laceration with tuft fracture Xray performed and sent home with disc for hand surgeon to review  - XR Finger Left G/E 2 Views; Future    2. Infiltrating ductal carcinoma of breast, stage 1, unspecified laterality (H)  Encouraged to schedule her mammogram    3. Morbid obesity due to excess calories (H)  Encouraged portion control and " healthy eating and increase activity           Linda Mcdonald, PA-C  [facility]

## 2018-06-28 NOTE — MR AVS SNAPSHOT
After Visit Summary   6/28/2018    Monica Samayoa    MRN: [id number]           Patient Information     Date Of Birth          1945        Visit Information        Provider Department      6/28/2018 2:20 PM Linda Mcdonald PA-C [facility]        Today's Diagnoses     Pain of left middle finger    -  1    Infiltrating ductal carcinoma of breast, stage 1, unspecified laterality (H)        Morbid obesity due to excess calories (H)          Care Instructions    At [facility], we strive to deliver an exceptional experience to you, every time we see you.  If you receive a survey in the mail, please send us back your thoughts. We really do value your feedback.    Suggested websites for health information:  Www.[facility].Cadee : Up to date and easily searchable information on multiple topics.  Www.medlineplus.gov : medication info, interactive tutorials, watch real surgeries online  Www.familydoctor.org : good info from the Academy of Family Physicians  Www.cdc.gov : public health info, travel advisories, epidemics (H1N1)  Www.aap.org : children's health info, normal development, vaccinations  Www.health.[facility].[place].us : [place] dept of health, public health issues in [place], N1N1    Your care team:     Family Medicine   JUAREZ Valentino MD Emily Bunt, DINO SPANGLER   S. MD Heather Brower MD Angela Wermerskirchen, MD         Clinic hours: Monday - Wednesday 7 am-7 pm   Thursdays and Fridays 7 am-5 pm.     [facility]: Monday - Friday 11 am-9 pm,   Saturday and Sunday 9 am-5 pm.    [facility]: Monday -Thursday 8 am-8 pm; Friday 8 am-6 pm; Saturday and Sunday 9 am-5 pm.     [facility]: Monday - Thursday 8 am - 7 pm; Friday 8 am - 6 pm    Clinic: [phone]   [facility]: [phone]   [facility]: [phone]                  Follow-ups after your visit    "     Who to contact     If you have questions or need follow up information about today's clinic visit or your schedule please contact [facility] directly at [phone].  Normal or non-critical lab and imaging results will be communicated to you by MyChart, letter or phone within 4 business days after the clinic has received the results. If you do not hear from us within 7 days, please contact the clinic through MyChart or phone. If you have a critical or abnormal lab result, we will notify you by phone as soon as possible.  Submit refill requests through Kwaga or call your pharmacy and they will forward the refill request to us. Please allow 3 business days for your refill to be completed.          Additional Information About Your Visit        Kwaga Information     Kwaga gives you secure access to your electronic health record. If you see a primary care provider, you can also send messages to your care team and make appointments. If you have questions, please call your primary care clinic.  If you do not have a primary care provider, please call [phone] and they will assist you.        Care EveryWhere ID     This is your Care EveryWhere ID. This could be used by other organizations to access your [facility] medical records  [id number]        Your Vitals Were     Pulse Temperature Respirations Height Pulse Oximetry Breastfeeding?    65 98.6  F (37  C) (Oral) 19 1.441 m (4' 8.75\") 95% No    BMI (Body Mass Index)                   38.42 kg/m2            Blood Pressure from Last 3 Encounters:   06/28/18 128/78   09/22/17 132/78   08/14/17 128/78    Weight from Last 3 Encounters:   06/28/18 79.8 kg (176 lb)   09/22/17 80.6 kg (177 lb 9.6 oz)   08/14/17 80.8 kg (178 lb 1.6 oz)               Primary Care Provider Office Phone # Fax #    Heather Adams [phone] [address]  [facility] [address]        Equal Access to Services     FAIZAN JUAREZ AH: Hadii aad ku " jamila Baumann, waprettyda luqadaha, qaybta kamaeda aristeo, marly kapoorrey eric. So [facility] [phone].    ATENCIÓN: Si maryla sonja, tiene a chambers disposición servicios gratuitos de asistencia lingüística. [place] al [phone].    We comply with applicable federal civil rights laws and [place] laws. We do not discriminate on the basis of race, color, national origin, age, disability, sex, sexual orientation, or gender identity.            Thank you!     Thank you for choosing [facility]  for your care. Our goal is always to provide you with excellent care. Hearing back from our patients is one way we can continue to improve our services. Please take a few minutes to complete the written survey that you may receive in the mail after your visit with us. Thank you!             Your Updated Medication List - Protect others around you: Learn how to safely use, store and throw away your medicines at www.disposemymeds.org.          This list is accurate as of 6/28/18 11:59 PM.  Always use your most recent med list.                   Brand Name Dispense Instructions for use Diagnosis    amLODIPine 5 MG tablet    NORVASC    90 tablet    Take 1 tablet (5 mg) by mouth daily    Hypertension goal BP (blood pressure) < 140/90       aspirin 81 MG tablet      Take  by mouth daily. *        calcium 500/D 500-200 MG-UNIT per tablet   Generic drug:  calcium carb 1250 mg (500 mg [place])/vitamin D 200 unit      Take  by mouth.        CO Q-10 PLUS RED YEAST RICE PO      Take 100 mg by mouth daily.        FLONASE 50 MCG/ACT spray   Generic drug:  fluticasone      Spray 1-2 sprays into both nostrils daily    Chronic rhinitis       losartan 50 MG tablet    COZAAR    90 tablet    Take 1 tablet (50 mg) by mouth daily    Hypertension goal BP (blood pressure) < 140/90       Lutein 15-0.7 MG Caps      Take  by mouth.        LUTEIN PO      Take  by mouth daily. 25 mg        nystatin 334067 UNIT/GM Powd     MYCOSTATIN    60 g    Apply powder liberally to groin and under breasts TID with rash and once daily for prevention    Candidal intertrigo       OMEGA 3 PO      750 MG 1 daily        RA CETIRIZINE 10 MG tablet   Generic drug:  cetirizine      Take 10 mg by mouth Once Daily.        simvastatin 40 MG tablet    ZOCOR    90 tablet    Take 1 tablet (40 mg) by mouth At Bedtime    Hyperlipidemia LDL goal <100       vitamin B complex with vitamin C Tabs tablet      Take 1 Cap by mouth daily.        vitamin C 500 MG Chew      1 TABLET DAILY        vitamin D3 2000 units Caps      Take  by mouth daily.

## 2018-06-29 NOTE — PROGRESS NOTES
Mark Anderson  As we discussed you do have a fracture of the tip of the finger.   Please call or MyChart my office with any questions or concerns.    Linda Mcdonald, PAC

## 2018-07-12 ENCOUNTER — TRANSFERRED RECORDS (OUTPATIENT)
Dept: HEALTH INFORMATION MANAGEMENT | Facility: CLINIC | Age: 73
End: 2018-07-12

## 2018-08-15 ENCOUNTER — MYC MEDICAL ADVICE (OUTPATIENT)
Dept: FAMILY MEDICINE | Facility: CLINIC | Age: 73
End: 2018-08-15

## 2018-08-15 DIAGNOSIS — E55.9 VITAMIN D DEFICIENCY: Primary | ICD-10-CM

## 2018-08-15 DIAGNOSIS — E78.5 HYPERLIPIDEMIA LDL GOAL <100: ICD-10-CM

## 2018-08-15 DIAGNOSIS — R79.89 ELEVATED TSH: ICD-10-CM

## 2018-08-15 DIAGNOSIS — D50.9 IRON DEFICIENCY ANEMIA, UNSPECIFIED IRON DEFICIENCY ANEMIA TYPE: ICD-10-CM

## 2018-08-15 DIAGNOSIS — I10 HYPERTENSION GOAL BP (BLOOD PRESSURE) < 140/90: ICD-10-CM

## 2018-08-22 DIAGNOSIS — R79.89 ELEVATED TSH: ICD-10-CM

## 2018-08-22 DIAGNOSIS — D50.9 IRON DEFICIENCY ANEMIA, UNSPECIFIED IRON DEFICIENCY ANEMIA TYPE: ICD-10-CM

## 2018-08-22 DIAGNOSIS — E55.9 VITAMIN D DEFICIENCY: ICD-10-CM

## 2018-08-22 DIAGNOSIS — I10 HYPERTENSION GOAL BP (BLOOD PRESSURE) < 140/90: ICD-10-CM

## 2018-08-22 DIAGNOSIS — E78.5 HYPERLIPIDEMIA LDL GOAL <100: ICD-10-CM

## 2018-08-22 LAB
ALBUMIN SERPL-MCNC: 4.1 G/DL (ref 3.4–5)
ALP SERPL-CCNC: 57 U/L (ref 40–150)
ALT SERPL W P-5'-P-CCNC: 39 U/L (ref 0–50)
ANION GAP SERPL CALCULATED.3IONS-SCNC: 8 MMOL/L (ref 3–14)
AST SERPL W P-5'-P-CCNC: 24 U/L (ref 0–45)
BASOPHILS # BLD AUTO: 0 10E9/L (ref 0–0.2)
BASOPHILS NFR BLD AUTO: 0.7 %
BILIRUB SERPL-MCNC: 0.6 MG/DL (ref 0.2–1.3)
BUN SERPL-MCNC: 12 MG/DL (ref 7–30)
CALCIUM SERPL-MCNC: 9.5 MG/DL (ref 8.5–10.1)
CHLORIDE SERPL-SCNC: 108 MMOL/L (ref 94–109)
CHOLEST SERPL-MCNC: 139 MG/DL
CO2 SERPL-SCNC: 25 MMOL/L (ref 20–32)
CREAT SERPL-MCNC: 0.75 MG/DL (ref 0.52–1.04)
DIFFERENTIAL METHOD BLD: NORMAL
EOSINOPHIL # BLD AUTO: 0.2 10E9/L (ref 0–0.7)
EOSINOPHIL NFR BLD AUTO: 3.7 %
ERYTHROCYTE [DISTWIDTH] IN BLOOD BY AUTOMATED COUNT: 13.7 % (ref 10–15)
GFR SERPL CREATININE-BSD FRML MDRD: 76 ML/MIN/1.7M2
GLUCOSE SERPL-MCNC: 99 MG/DL (ref 70–99)
HCT VFR BLD AUTO: 42.5 % (ref 35–47)
HDLC SERPL-MCNC: 43 MG/DL
HGB BLD-MCNC: 14.3 G/DL (ref 11.7–15.7)
LDLC SERPL CALC-MCNC: 63 MG/DL
LYMPHOCYTES # BLD AUTO: 2.3 10E9/L (ref 0.8–5.3)
LYMPHOCYTES NFR BLD AUTO: 40.8 %
MCH RBC QN AUTO: 29.1 PG (ref 26.5–33)
MCHC RBC AUTO-ENTMCNC: 33.6 G/DL (ref 31.5–36.5)
MCV RBC AUTO: 87 FL (ref 78–100)
MONOCYTES # BLD AUTO: 0.4 10E9/L (ref 0–1.3)
MONOCYTES NFR BLD AUTO: 7.4 %
NEUTROPHILS # BLD AUTO: 2.7 10E9/L (ref 1.6–8.3)
NEUTROPHILS NFR BLD AUTO: 47.4 %
NONHDLC SERPL-MCNC: 96 MG/DL
PLATELET # BLD AUTO: 234 10E9/L (ref 150–450)
POTASSIUM SERPL-SCNC: 3.7 MMOL/L (ref 3.4–5.3)
PROT SERPL-MCNC: 7.3 G/DL (ref 6.8–8.8)
RBC # BLD AUTO: 4.91 10E12/L (ref 3.8–5.2)
SODIUM SERPL-SCNC: 141 MMOL/L (ref 133–144)
T4 FREE SERPL-MCNC: 1.19 NG/DL (ref 0.76–1.46)
TRIGL SERPL-MCNC: 166 MG/DL
TSH SERPL DL<=0.005 MIU/L-ACNC: 4.04 MU/L (ref 0.4–4)
WBC # BLD AUTO: 5.6 10E9/L (ref 4–11)

## 2018-08-22 PROCEDURE — 80053 COMPREHEN METABOLIC PANEL: CPT | Performed by: FAMILY MEDICINE

## 2018-08-22 PROCEDURE — 84439 ASSAY OF FREE THYROXINE: CPT | Performed by: FAMILY MEDICINE

## 2018-08-22 PROCEDURE — 36415 COLL VENOUS BLD VENIPUNCTURE: CPT | Performed by: FAMILY MEDICINE

## 2018-08-22 PROCEDURE — 80061 LIPID PANEL: CPT | Performed by: FAMILY MEDICINE

## 2018-08-22 PROCEDURE — 82306 VITAMIN D 25 HYDROXY: CPT | Performed by: FAMILY MEDICINE

## 2018-08-22 PROCEDURE — 84443 ASSAY THYROID STIM HORMONE: CPT | Performed by: FAMILY MEDICINE

## 2018-08-22 PROCEDURE — 85025 COMPLETE CBC W/AUTO DIFF WBC: CPT | Performed by: FAMILY MEDICINE

## 2018-08-23 ENCOUNTER — OFFICE VISIT (OUTPATIENT)
Dept: FAMILY MEDICINE | Facility: CLINIC | Age: 73
End: 2018-08-23
Payer: COMMERCIAL

## 2018-08-23 VITALS
WEIGHT: 172 LBS | BODY MASS INDEX: 38.69 KG/M2 | DIASTOLIC BLOOD PRESSURE: 78 MMHG | SYSTOLIC BLOOD PRESSURE: 134 MMHG | TEMPERATURE: 98.2 F | HEIGHT: 56 IN | OXYGEN SATURATION: 97 % | HEART RATE: 65 BPM

## 2018-08-23 DIAGNOSIS — Z00.00 MEDICARE ANNUAL WELLNESS VISIT, SUBSEQUENT: Primary | ICD-10-CM

## 2018-08-23 DIAGNOSIS — H91.90 HEARING PROBLEM, UNSPECIFIED LATERALITY: ICD-10-CM

## 2018-08-23 DIAGNOSIS — E78.5 HYPERLIPIDEMIA LDL GOAL <100: ICD-10-CM

## 2018-08-23 DIAGNOSIS — B37.2 CANDIDAL INTERTRIGO: ICD-10-CM

## 2018-08-23 DIAGNOSIS — Z23 NEED FOR SHINGLES VACCINE: ICD-10-CM

## 2018-08-23 DIAGNOSIS — I10 HYPERTENSION GOAL BP (BLOOD PRESSURE) < 140/90: ICD-10-CM

## 2018-08-23 LAB
CREAT UR-MCNC: 221 MG/DL
MICROALBUMIN UR-MCNC: 22 MG/L
MICROALBUMIN/CREAT UR: 9.91 MG/G CR (ref 0–25)

## 2018-08-23 PROCEDURE — 99397 PER PM REEVAL EST PAT 65+ YR: CPT | Mod: 25 | Performed by: FAMILY MEDICINE

## 2018-08-23 PROCEDURE — 90471 IMMUNIZATION ADMIN: CPT | Performed by: FAMILY MEDICINE

## 2018-08-23 PROCEDURE — 82043 UR ALBUMIN QUANTITATIVE: CPT | Performed by: FAMILY MEDICINE

## 2018-08-23 PROCEDURE — 90750 HZV VACC RECOMBINANT IM: CPT | Performed by: FAMILY MEDICINE

## 2018-08-23 RX ORDER — LOSARTAN POTASSIUM 50 MG/1
50 TABLET ORAL DAILY
Qty: 90 TABLET | Refills: 1 | Status: SHIPPED | OUTPATIENT
Start: 2018-08-23 | End: 2019-03-13

## 2018-08-23 RX ORDER — SIMVASTATIN 40 MG
40 TABLET ORAL AT BEDTIME
Qty: 90 TABLET | Refills: 3 | Status: SHIPPED | OUTPATIENT
Start: 2018-08-23 | End: 2019-08-01

## 2018-08-23 ASSESSMENT — PAIN SCALES - GENERAL: PAINLEVEL: NO PAIN (0)

## 2018-08-23 NOTE — PATIENT INSTRUCTIONS
Shingrix vaccine today, please return to clinic for booster vaccine in 2 to 6 months.     Labs today. I will notify you of results when I receive them.     Discontinue  The amlodipine and monitor blood pressures at home when not taking amlodipine. If blood pressure are consistently elevated above or at 140/80 restart taking amlodipine. Continue taking Losartan as previous however.     Continue all other medications as directed.     Follow up with hearing screening as planned.  You can call [phone] to schedule this at the [facility] in [place] (formerly the [facility]).    Continue to apply Nystatin powder as needed.         Preventive Health Recommendations  Female Ages 65 +    Yearly exam:     See your health care provider every year in order to  o Review health changes.   o Discuss preventive care.    o Review your medicines if your doctor has prescribed any.      You no longer need a yearly Pap test unless you've had an abnormal Pap test in the past 10 years. If you have vaginal symptoms, such as bleeding or discharge, be sure to talk with your provider about a Pap test.      Every 1 to 2 years, have a mammogram.  If you are over 69, talk with your health care provider about whether or not you want to continue having screening mammograms.      Every 10 years, have a colonoscopy. Or, have a yearly FIT test (stool test). These exams will check for colon cancer.       Have a cholesterol test every 5 years, or more often if your doctor advises it.       Have a diabetes test (fasting glucose) every three years. If you are at risk for diabetes, you should have this test more often.       At age 65, have a bone density scan (DEXA) to check for osteoporosis (brittle bone disease).    Shots:    Get a flu shot each year.    Get a tetanus shot every 10 years.    Talk to your doctor about your pneumonia vaccines. There are now two you should receive - Pneumovax (PPSV 23) and  Prevnar (PCV 13).    Talk to your pharmacist about the shingles vaccine.    Talk to your doctor about the hepatitis B vaccine.    Nutrition:     Eat at least 5 servings of fruits and vegetables each day.      Eat whole-grain bread, whole-wheat pasta and brown rice instead of white grains and rice.      Get adequate about Calcium and Vitamin D.     Lifestyle    Exercise at least 150 minutes a week (30 minutes a day, 5 days a week). This will help you control your weight and prevent disease.      Limit alcohol to one drink per day.      No smoking.       Wear sunscreen to prevent skin cancer.       See your dentist twice a year for an exam and cleaning.      See your eye doctor every 1 to 2 years to screen for conditions such as glaucoma, macular degeneration, cataracts, etc

## 2018-08-23 NOTE — MR AVS SNAPSHOT
After Visit Summary   8/23/2018    Monica Samayoa    MRN: [id number]           Patient Information     Date Of Birth          1945        Visit Information        Provider Department      8/23/2018 11:20 AM Heather Adams MD [facility]        Today's Diagnoses     Medicare annual wellness visit, subsequent    -  1    Need for shingles vaccine        Hearing problem, unspecified laterality        Hypertension goal BP (blood pressure) < 140/90        Hyperlipidemia LDL goal <100          Care Instructions    Shingrix vaccine today, please return to clinic for booster vaccine in 2 to 6 months.     Labs today. I will notify you of results when I receive them.     Discontinue  The amlodipine and monitor blood pressures at home when not taking amlodipine. If blood pressure are consistently elevated above or at 140/80 restart taking amlodipine. Continue taking Losartan as previous however.     Continue all other medications as directed.     Follow up with hearing screening as planned.  You can call [phone] to schedule this at the [facility] in [place] (formerly the [facility]).    Continue to apply Nystatin powder as needed.         Preventive Health Recommendations  Female Ages 65 +    Yearly exam:     See your health care provider every year in order to  o Review health changes.   o Discuss preventive care.    o Review your medicines if your doctor has prescribed any.      You no longer need a yearly Pap test unless you've had an abnormal Pap test in the past 10 years. If you have vaginal symptoms, such as bleeding or discharge, be sure to talk with your provider about a Pap test.      Every 1 to 2 years, have a mammogram.  If you are over 69, talk with your health care provider about whether or not you want to continue having screening mammograms.      Every 10 years, have a colonoscopy. Or, have a yearly FIT test (stool test). These  exams will check for colon cancer.       Have a cholesterol test every 5 years, or more often if your doctor advises it.       Have a diabetes test (fasting glucose) every three years. If you are at risk for diabetes, you should have this test more often.       At age 65, have a bone density scan (DEXA) to check for osteoporosis (brittle bone disease).    Shots:    Get a flu shot each year.    Get a tetanus shot every 10 years.    Talk to your doctor about your pneumonia vaccines. There are now two you should receive - Pneumovax (PPSV 23) and Prevnar (PCV 13).    Talk to your pharmacist about the shingles vaccine.    Talk to your doctor about the hepatitis B vaccine.    Nutrition:     Eat at least 5 servings of fruits and vegetables each day.      Eat whole-grain bread, whole-wheat pasta and brown rice instead of white grains and rice.      Get adequate about Calcium and Vitamin D.     Lifestyle    Exercise at least 150 minutes a week (30 minutes a day, 5 days a week). This will help you control your weight and prevent disease.      Limit alcohol to one drink per day.      No smoking.       Wear sunscreen to prevent skin cancer.       See your dentist twice a year for an exam and cleaning.      See your eye doctor every 1 to 2 years to screen for conditions such as glaucoma, macular degeneration, cataracts, etc           Follow-ups after your visit        Additional Services     AUDIOLOGY ADULT REFERRAL       Your provider has referred you to: [facility]: [facility] [place] [phone]   [email or web address]    Treatment:  Evaluation & Treatment  Specialty Testing:  Audiogram w/Tymps and Reflexes    Please be aware that coverage of these services is subject to the terms and limitations of your health insurance plan.  Call member services at your health plan with any benefit or coverage questions.      Please bring the following to your  "appointment:  >>   Any x-rays, CTs or MRIs which have been performed.  Contact the facility where they were done to arrange for  prior to your scheduled appointment.   >>   List of current medications  >>   This referral request   >>   Any documents/labs given to you for this referral                  Who to contact     If you have questions or need follow up information about today's clinic visit or your schedule please contact [facility] directly at [phone].  Normal or non-critical lab and imaging results will be communicated to you by [employer], letter or phone within 4 business days after the clinic has received the results. If you do not hear from us within 7 days, please contact the clinic through [employer] or phone. If you have a critical or abnormal lab result, we will notify you by phone as soon as possible.  Submit refill requests through [employer] or call your pharmacy and they will forward the refill request to us. Please allow 3 business days for your refill to be completed.          Additional Information About Your Visit        Audyssey Information     [employer] gives you secure access to your electronic health record. If you see a primary care provider, you can also send messages to your care team and make appointments. If you have questions, please call your primary care clinic.  If you do not have a primary care provider, please call [phone] and they will assist you.        Care EveryWhere ID     This is your Care EveryWhere ID. This could be used by other organizations to access your [facility] medical records  [id number]        Your Vitals Were     Pulse Temperature Height Last Period Pulse Oximetry Breastfeeding?    65 98.2  F (36.8  C) (Oral) 1.422 m (4' 8\") (LMP Unknown) 97% No    BMI (Body Mass Index)                   38.56 kg/m2            Blood Pressure from Last 3 Encounters:   08/23/18 134/78   06/28/18 128/78   09/22/17 132/78    Weight from Last 3 Encounters: "   08/23/18 78 kg (172 lb)   06/28/18 79.8 kg (176 lb)   09/22/17 80.6 kg (177 lb 9.6 oz)              We Performed the Following     Albumin Random Urine Quantitative with Creat Ratio     AUDIOLOGY ADULT REFERRAL     ZOSTER VACCINE RECOMBINANT ADJUVANTED IM NJX          Today's Medication Changes          These changes are accurate as of 8/23/18 11:58 AM.  If you have any questions, ask your nurse or doctor.               Stop taking these medicines if you haven't already. Please contact your care team if you have questions.     amLODIPine 5 MG tablet   Commonly known as:  NORVASC   Stopped by:  Heather Adams MD                Where to get your medicines      These medications were sent to [id number] - [place] - [address]  [address], [facility] [address]     Phone:  [phone]     losartan 50 MG tablet    simvastatin 40 MG tablet                Primary Care Provider Office Phone # Fax #    Heather Adams [phone] [address]  [facility] [address]        Equal Access to Services     [facility]: Hadii aad ku hadasho Soomaali, waaxda luqadaha, qaybta kaalmada adeegyada, marly bean haydionision suzy hernández . So [facility] [phone].    ATENCIÓN: Si habla español, tiene a chambers disposición servicios gratuitos de asistencia lingüística. [facility] [phone].    We comply with applicable federal civil rights laws and [place] laws. We do not discriminate on the basis of race, color, national origin, age, disability, sex, sexual orientation, or gender identity.            Thank you!     Thank you for choosing [facility]  for your care. Our goal is always to provide you with excellent care. Hearing back from our patients is one way we can continue to improve our services. Please take a few minutes to complete the written survey that you may receive in the mail after your visit with us. Thank you!             Your Updated  Medication List - Protect others around you: Learn how to safely use, store and throw away your medicines at www.disposemymeds.org.          This list is accurate as of 8/23/18 11:58 AM.  Always use your most recent med list.                   Brand Name Dispense Instructions for use Diagnosis    aspirin 81 MG tablet      Take  by mouth daily. *        calcium 500/D 500-200 MG-UNIT per tablet   Generic drug:  calcium carb 1250 mg (500 mg Tribal)/vitamin D 200 unit      Take  by mouth.        CO Q-10 PLUS RED YEAST RICE PO      Take 100 mg by mouth daily.        FLONASE 50 MCG/ACT spray   Generic drug:  fluticasone      Spray 1-2 sprays into both nostrils daily    Chronic rhinitis       losartan 50 MG tablet    COZAAR    90 tablet    Take 1 tablet (50 mg) by mouth daily    Hypertension goal BP (blood pressure) < 140/90       Lutein 15-0.7 MG Caps      Take  by mouth.        nystatin 610776 UNIT/GM Powd    MYCOSTATIN    60 g    Apply powder liberally to groin and under breasts TID with rash and once daily for prevention    Candidal intertrigo       RA CETIRIZINE 10 MG tablet   Generic drug:  cetirizine      Take 10 mg by mouth Once Daily.        simvastatin 40 MG tablet    ZOCOR    90 tablet    Take 1 tablet (40 mg) by mouth At Bedtime    Hyperlipidemia LDL goal <100       vitamin B complex with vitamin C Tabs tablet      Take 1 Cap by mouth daily.        vitamin C 500 MG Chew      1 TABLET DAILY        vitamin D3 2000 units Caps      Take  by mouth daily.

## 2018-08-23 NOTE — PROGRESS NOTES
SUBJECTIVE:   Monica Samayoa is a 73 year old female who presents for Preventive Visit.      Are you in the first 12 months of your Medicare Part B coverage?  No    Healthy Habits:    Do you get at least three servings of calcium containing foods daily (dairy, green leafy vegetables, etc.)? yes    Amount of exercise or daily activities, outside of work: 2-3 day(s) per week    Problems taking medications regularly No    Medication side effects: No    Have you had an eye exam in the past two years? yes    Do you see a dentist twice per year? yes    Do you have sleep apnea, excessive snoring or daytime drowsiness?no      Ability to successfully perform activities of daily living: Yes, no assistance needed    Home safety:  none identified     Hearing impairment: Yes, Difficulty following a conversation in a noisy restaurant or crowded room.    Fall risk:  Fallen 2 or more times in the past year?: Yes  Any fall with injury in the past year?: Yes        COGNITIVE SCREEN  1) Repeat 3 items (Leader, Season, Table)    2) Clock draw: NORMAL  3) 3 item recall: Recalls 3 objects  Results: 3 items recalled: COGNITIVE IMPAIRMENT LESS LIKELY    Mini-CogTM Copyright S Mark. Licensed by the author for use in [facility]; reprinted with permission (celine@[employer]). All rights reserved.      Hypertension and Weight  BP Readings from Last 3 Encounters:   08/23/18 134/78   06/28/18 128/78   09/22/17 132/78     Wt Readings from Last 5 Encounters:   08/23/18 78 kg (172 lb)   06/28/18 79.8 kg (176 lb)   09/22/17 80.6 kg (177 lb 9.6 oz)   08/14/17 80.8 kg (178 lb 1.6 oz)   11/08/16 79.8 kg (176 lb)     She was wondering if she can discontinue taking amlodipine and continue with taking only losartan. She does not monitor blood pressures at home.  Would like to limit medications.  Has cuff at home and can monitor there.      Exercise   She stated that she walks for exercise and goes to the gym for weights and balance exercises.  She tolerates all activities, denies pain or shortness of breath.     Dental   She reported that she needed her tooth pulled last week and was taking amoxicillin. She reported that last week also she experienced poor appetite. She completed antibiotics two days ago. Denies GI symptoms due to taking antibiotics with yogurt, but reported that she experienced loose stools in the last 24-48 hours only.       Hand  After laceration of the left finger tip in May 7, Left finger tips are reported to be very sensitives and reported that she is beginning to develop calluses. She tried to file nail to determine if symptoms improve.  Denies pain.    She was seen here in June 28, 2018 and had an xray for recheck of finger tuft fracture.  Hand surgeon evaluated this and no further treatment needed.     Aspirin 81mg  Patient stated that she is taking aspirin 81mg without side effects.       Labs   Previous labs completed yesterday 8/22 reviewed and discussed with patient.     Cholesterol  She stated that she has not been exercising as much compared to last year.    Recent Labs   Lab Test  08/22/18   0750  08/08/17   0700   09/04/15   0758  08/21/14   0756   CHOL  139  152   < >  162  164   HDL  43*  55   < >  53  56   LDL  63  72   < >  82  77   TRIG  166*  124   < >  134  154*   CHOLHDLRATIO   --    --    --   3.1  2.9    < > = values in this interval not displayed.     She is taking Zocor as directed.        Hypothyroidism Follow-up      Since last visit, patient describes the following symptoms: Weight stable, no hair loss, no skin changes, no constipation, no loose stools      Lab Results   Component Value Date    TSH 4.04 08/22/2018     T4 Free   Date Value Ref Range Status   08/22/2018 1.19 0.76 - 1.46 ng/dL Final   08/08/2017 1.09 0.76 - 1.46 ng/dL Final   08/28/2014 1.18 0.76 - 1.46 ng/dL Final     Comment:     Effective 7/30/2014, the reference range for this assay has changed to reflect   new  instrumentation/methodology.     08/21/2014 1.08 0.76 - 1.46 ng/dL Final     Comment:     Effective 7/30/2014, the reference range for this assay has changed to reflect   new instrumentation/methodology.             Hearing and Allergies   She reports of some hearing loss and would like hearing screening. She stated that several weeks ago she experienced symptoms of ear plugged, and reported that her allergist did not visualize cerumen in ears.      However she reports that in the last several weeks symptoms were okay.     She follows with allergist for allergy shots.       Immunization  Patient would like Shingrix vaccine today.     Mammogram   Last completed 7/12/2018.       Candidal intertrigo  She reports that nystatin helps with rash located under bilateral breast. Denies area becoming wet or moist, denies discharge.   Reviewed and updated as needed this visit by clinical staff  Tobacco  Allergies  Meds  Med Hx  Surg Hx  Fam Hx  Soc Hx        Reviewed and updated as needed this visit by Provider  Tobacco  Allergies  Meds  Med Hx  Surg Hx  Fam Hx  Soc Hx       Social History   Substance Use Topics     Smoking status: Never Smoker     Smokeless tobacco: Never Used     Alcohol use Yes      Comment: 1-2  glasses of wine a week       If you drink alcohol do you typically have >3 drinks per day or >7 drinks per week? No                        Today's PHQ-2 Score:   PHQ-2 ( 1999 Pfizer) 8/23/2018 8/14/2017   Q1: Little interest or pleasure in doing things 0 0   Q2: Feeling down, depressed or hopeless 0 0   PHQ-2 Score 0 0   Q1: Little interest or pleasure in doing things - -   Q2: Feeling down, depressed or hopeless - -   PHQ-2 Score - -       Do you feel safe in your environment - Yes    Do you have a Health Care Directive?: Yes: Advance Directive has been received and scanned.    Current providers sharing in care for this patient include:   Patient Care Team:  Heather Adams MD as PCP - General  (Family Practice)    The following health maintenance items are reviewed in Epic and correct as of today:  Health Maintenance   Topic Date Due     ADVANCE DIRECTIVE PLANNING Q5 YRS  07/12/2016     WELLNESS VISIT Q1 YR  09/21/2016     FALL RISK ASSESSMENT  08/14/2018     PHQ-2 Q1 YR  08/14/2018     INFLUENZA VACCINE (1) 09/01/2018     MAMMO SCREEN Q2 YR (SYSTEM ASSIGNED)  07/12/2020     COLONOSCOPY Q10 YR  05/24/2023     LIPID SCREEN Q5 YR FEMALE (SYSTEM ASSIGNED)  08/22/2023     TETANUS IMMUNIZATION (SYSTEM ASSIGNED)  08/14/2027     DEXA SCAN SCREENING (SYSTEM ASSIGNED)  Completed     PNEUMOCOCCAL  Completed     HEPATITIS C SCREENING  Completed     Labs reviewed in EPIC  BP Readings from Last 3 Encounters:   08/23/18 134/78   06/28/18 128/78   09/22/17 132/78    Wt Readings from Last 3 Encounters:   08/23/18 78 kg (172 lb)   06/28/18 79.8 kg (176 lb)   09/22/17 80.6 kg (177 lb 9.6 oz)                  Patient Active Problem List   Diagnosis     MARTIN (dyspnea on exertion)     Patient travels     Iron deficiency anemia     Hyperlipidemia LDL goal <100     Allergic state     Precancerous skin lesion     Invasive ductal carcinoma of breast, stage 1 (H)     Hypertension goal BP (blood pressure) < 140/90     Advanced directives, counseling/discussion     Watery eyes     Osteopenia     Candidal intertrigo     Elevated TSH     Geographic tongue     Glossitis     Vitamin D deficiency     Essential hypertension with goal blood pressure less than 140/90     Constipation, unspecified constipation type     Morbid obesity (H)     Past Surgical History:   Procedure Laterality Date     BIOPSY  11/18/2010    left breast cancer     BREAST SURGERY      lumpectomy     COLONOSCOPY  5/24/2013     ORTHOPEDIC SURGERY  1995    left ankle, fibula     REPAIR ECTROPION BILATERAL  8/15/12     REPAIR ECTROPION BILATERAL  7/17/2013    Procedure: REPAIR ECTROPION BILATERAL;  BILATERAL LOWER EYELID ECTROPION REPAIR;  Surgeon: Neel Latham MD;   [facility]: [facility]     SURGICAL HISTORY OF -       left ankle pins       Social History   Substance Use Topics     Smoking status: Never Smoker     Smokeless tobacco: Never Used     Alcohol use Yes      Comment: 1-2  glasses of wine a week     Family History   Problem Relation Age of Onset     C.A.D. Father      Asthma Other      Breast Cancer Other      maternal aunt, paternal GM, aunt paternal     Diabetes Other      Uncle maternal     Breast Cancer Other      Asthma Other      Osteoperosis Maternal Grandmother      Cancer - colorectal No family hx of      Anesthesia Reaction No family hx of          Current Outpatient Prescriptions   Medication Sig Dispense Refill     amLODIPine (NORVASC) 5 MG tablet Take 1 tablet (5 mg) by mouth daily 90 tablet 1     aspirin 81 MG tablet Take  by mouth daily. *       calcium carb 1250 mg, 500 mg [place],/vitamin D 200 unit (CALCIUM 500/D) 500-200 MG-UNIT per tablet Take  by mouth.       cetirizine (RA CETIRIZINE) 10 MG tablet Take 10 mg by mouth Once Daily.       Cholecalciferol (VITAMIN D3) 2000 UNIT CAPS Take  by mouth daily.       fluticasone (FLONASE) 50 MCG/ACT nasal spray Spray 1-2 sprays into both nostrils daily       losartan (COZAAR) 50 MG tablet Take 1 tablet (50 mg) by mouth daily 90 tablet 1     Lutein 15-0.7 MG CAPS Take  by mouth.       nystatin (MYCOSTATIN) 316716 UNIT/GM POWD Apply powder liberally to groin and under breasts TID with rash and once daily for prevention 60 g 5     simvastatin (ZOCOR) 40 MG tablet Take 1 tablet (40 mg) by mouth At Bedtime 90 tablet 3     vitamin  B complex with vitamin C (VITAMIN  B COMPLEX) TABS Take 1 Cap by mouth daily.       VITAMIN C 500 MG PO CHEW 1 TABLET DAILY       CO Q-10 PLUS RED YEAST RICE PO Take 100 mg by mouth daily.       Allergies   Allergen Reactions     Seasonal Allergies Nausea     Congested   Pollen tree grass cats  Had shots in past     Recent Labs   Lab Test  08/22/18   0750  08/08/17   0700  09/01/16   0750   "09/04/15   0758   LDL  63  72  91  82   HDL  43*  55  56  53   TRIG  166*  124  161*  134   ALT  39  40   --   65*   CR  0.75  0.79  0.77  0.82   GFRESTIMATED  76  72  74  69   GFRESTBLACK  >90  87  89  83   POTASSIUM  3.7  3.9  3.9  4.1   TSH  4.04*  4.48*   --   3.33        Pneumonia Vaccine: series completed   Mammogram Screening: Patient over age 50, mutual decision to screen reflected in health maintenance.    ROS:  Constitutional, HEENT, cardiovascular, pulmonary, GI, , musculoskeletal, neuro, skin, endocrine and psych systems are negative, except as otherwise noted.    This document serves as a record of the services and decisions personally performed and made by Heather Adams MD. It was created on her behalf by Juan Fried, a trained medical scribe. The creation of this document is based on the provider's statements to the medical scribe.  Juan Fried August 23, 2018 11:26 AM      OBJECTIVE:   /78 (BP Location: Right arm, Patient Position: Chair, Cuff Size: Adult Regular)  Pulse 65  Temp 98.2  F (36.8  C) (Oral)  Ht 1.422 m (4' 8\")  Wt 78 kg (172 lb)  LMP  (LMP Unknown)  SpO2 97%  Breastfeeding? No  BMI 38.56 kg/m2   EXAM:   GENERAL APPEARANCE: obese, alert and no distress  EYES: Eyes grossly normal to inspection, PERRL and conjunctivae and sclerae normal  HENT: ear canals and TM's normal, nose and mouth without ulcers or lesions, oropharynx clear, oral mucous membranes moist and both ears: normal: no effusions, no erythema, normal landmarks and mild cerumen.   NECK: no adenopathy, no asymmetry, masses, or scars and thyroid normal to palpation  RESP: lungs clear to auscultation - no rales, rhonchi or wheezes  BREAST: normal without masses, tenderness or nipple discharge and no palpable axillary masses or adenopathy  CV: regular rate and rhythm, normal S1 S2, no S3 or S4, no murmur, click or rub, no peripheral edema and peripheral pulses strong  ABDOMEN: soft, nontender, no " hepatosplenomegaly, no masses and bowel sounds normal  MS: no musculoskeletal defects are noted and gait is age appropriate without ataxia  SKIN: no suspicious lesions or rashes and under bilateral breast there is redness. Hair loss scalp  NEURO: Normal strength and tone, sensory exam grossly normal, mentation intact and speech normal  PSYCH: mentation appears normal and affect normal/bright    Diagnostic Test Results:    Results for orders placed or performed in visit on 08/22/18   Lipid panel reflex to direct LDL Fasting   Result Value Ref Range    Cholesterol 139 <200 mg/dL    Triglycerides 166 (H) <150 mg/dL    HDL Cholesterol 43 (L) >49 mg/dL    LDL Cholesterol Calculated 63 <100 mg/dL    Non HDL Cholesterol 96 <130 mg/dL   Comprehensive metabolic panel   Result Value Ref Range    Sodium 141 133 - 144 mmol/L    Potassium 3.7 3.4 - 5.3 mmol/L    Chloride 108 94 - 109 mmol/L    Carbon Dioxide 25 20 - 32 mmol/L    Anion Gap 8 3 - 14 mmol/L    Glucose 99 70 - 99 mg/dL    Urea Nitrogen 12 7 - 30 mg/dL    Creatinine 0.75 0.52 - 1.04 mg/dL    GFR Estimate 76 >60 mL/min/1.7m2    GFR Estimate If Black >90 >60 mL/min/1.7m2    Calcium 9.5 8.5 - 10.1 mg/dL    Bilirubin Total 0.6 0.2 - 1.3 mg/dL    Albumin 4.1 3.4 - 5.0 g/dL    Protein Total 7.3 6.8 - 8.8 g/dL    Alkaline Phosphatase 57 40 - 150 U/L    ALT 39 0 - 50 U/L    AST 24 0 - 45 U/L   CBC with platelets differential   Result Value Ref Range    WBC 5.6 4.0 - 11.0 10e9/L    RBC Count 4.91 3.8 - 5.2 10e12/L    Hemoglobin 14.3 11.7 - 15.7 g/dL    Hematocrit 42.5 35.0 - 47.0 %    MCV 87 78 - 100 fl    MCH 29.1 26.5 - 33.0 pg    MCHC 33.6 31.5 - 36.5 g/dL    RDW 13.7 10.0 - 15.0 %    Platelet Count 234 150 - 450 10e9/L    Diff Method Automated Method     % Neutrophils 47.4 %    % Lymphocytes 40.8 %    % Monocytes 7.4 %    % Eosinophils 3.7 %    % Basophils 0.7 %    Absolute Neutrophil 2.7 1.6 - 8.3 10e9/L    Absolute Lymphocytes 2.3 0.8 - 5.3 10e9/L    Absolute Monocytes  0.4 0.0 - 1.3 10e9/L    Absolute Eosinophils 0.2 0.0 - 0.7 10e9/L    Absolute Basophils 0.0 0.0 - 0.2 10e9/L   TSH with free T4 reflex   Result Value Ref Range    TSH 4.04 (H) 0.40 - 4.00 mU/L   T4 free   Result Value Ref Range    T4 Free 1.19 0.76 - 1.46 ng/dL       ASSESSMENT / PLAN:   1. Medicare annual wellness visit, subsequent  Patient has no major health concerns today. Labs completed yesterday. She will continue taking all medications as directed unless otherwise noted bellow with hypertension. For the Candidal intertrigo located under breast ,she will continue using Nystatin powder as directed.     2. Need for shingles vaccine  Shingrix vaccine today, she will return to clinic for booster vaccine in 2 to 6 months.   - ZOSTER VACCINE RECOMBINANT ADJUVANTED IM NJX  - VACCINE ADMINISTRATION, INITIAL    3. Hearing problem, unspecified laterality  - AUDIOLOGY ADULT REFERRAL    4. Hypertension goal BP (blood pressure) < 140/90  She would like to discontinue amlodipine due to being another medication to take. She will do so and monitor blood pressures at home when not taking amlodipine. If blood pressure are consistently elevated above or at 140/80 she is advised to restart taking amlodipine. Continue taking Losartan as previous however.   - Albumin Random Urine Quantitative with Creat Ratio  - losartan (COZAAR) 50 MG tablet; Take 1 tablet (50 mg) by mouth daily  Dispense: 90 tablet; Refill: 1    5. Hyperlipidemia LDL goal <100  Stable with Zocor. She will continue to take as directed. Healthy lifestyle choices of diet and exercise discussed with patient.    - simvastatin (ZOCOR) 40 MG tablet; Take 1 tablet (40 mg) by mouth At Bedtime  Dispense: 90 tablet; Refill: 3    6.  Candidal intertrigo   Topical use of Nystatin powder planned.      End of Life Planning:  Patient currently has an advanced directive: Yes.  Practitioner is supportive of decision.    COUNSELING:  Reviewed preventive health counseling, as  "reflected in patient instructions       Regular exercise       Healthy diet/nutrition       Vision screening       Hearing screening       Dental care       Immunizations    Vaccinated for: Shingrix             Osteoporosis Prevention/Bone Health    BP Readings from Last 1 Encounters:   06/28/18 128/78     Estimated body mass index is 38.42 kg/(m^2) as calculated from the following:    Height as of 6/28/18: 1.441 m (4' 8.75\").    Weight as of 6/28/18: 79.8 kg (176 lb).      Weight management plan: Discussed healthy diet and exercise guidelines and patient will follow up in 6 months in clinic to re-evaluate.     reports that she has never smoked. She has never used smokeless tobacco.      Appropriate preventive services were discussed with this patient, including applicable screening as appropriate for cardiovascular disease, diabetes, osteopenia/osteoporosis, and glaucoma.  As appropriate for age/gender, discussed screening for colorectal cancer, prostate cancer, breast cancer, and cervical cancer. Checklist reviewing preventive services available has been given to the patient.    Reviewed patients plan of care and provided an AVS. The Basic Care Plan (routine screening as documented in Health Maintenance) for Monica meets the Care Plan requirement. This Care Plan has been established and reviewed with the Patient.    Counseling Resources:  ATP IV Guidelines  Pooled Cohorts Equation Calculator  Breast Cancer Risk Calculator  FRAX Risk Assessment  ICSI Preventive Guidelines  Dietary Guidelines for Americans, 2010  USDA's MyPlate  ASA Prophylaxis  Lung CA Screening    The information in this document, created by the medical scribe for me, accurately reflects the services I personally performed and the decisions made by me. I have reviewed and approved this document for accuracy prior to leaving the patient care area.  August 23, 2018 12:50 PM      Heather Adams MD  [facility]    Patient Instructions "   Shingrix vaccine today, please return to clinic for booster vaccine in 2 to 6 months.     Labs today. I will notify you of results when I receive them.     Discontinue  The amlodipine and monitor blood pressures at home when not taking amlodipine. If blood pressure are consistently elevated above or at 140/80 restart taking amlodipine. Continue taking Losartan as previous however.     Continue all other medications as directed.     Follow up with hearing screening as planned.  You can call [phone] to schedule this at the [facility] in [place] (formerly the [facility]).    Continue to apply Nystatin powder as needed.

## 2018-08-23 NOTE — PROGRESS NOTES
Your thyroid testing shows normal active thyroid hormone.  The thyroid stimulating hormone is borderline high which may indicate a need to adjust your medication.  We will discuss this at your upcoming appointment.  The cholesteol levels are in goal range with the exception of a low HDL, good cholesterol and borderline high triglyceride level.  Exercise is frequently helpful in bringing this up toward goal.  Continued use of the simvastatin is recommended.  Your blood sugar is normal.  Your kidney and liver testing are normal.   Your blood cell counts are normal.  Your vitamin D level is pending and will be forwarded when that is available   Please call or MyChart message me if you have any questions.  PSK

## 2018-08-23 NOTE — NURSING NOTE
Screening Questionnaire for Adult Immunization    Are you sick today?   No   Do you have allergies to medications, food, a vaccine component or latex?   No   Have you ever had a serious reaction after receiving a vaccination?   No   Do you have a long-term health problem with heart disease, lung disease, asthma, kidney disease, metabolic disease (e.g. diabetes), anemia, or other blood disorder?   No   Do you have cancer, leukemia, HIV/AIDS, or any other immune system problem?   Yes   In the past 3 months, have you taken medications that affect  your immune system, such as prednisone, other steroids, or anticancer drugs; drugs for the treatment of rheumatoid arthritis, Crohn s disease, or psoriasis; or have you had radiation treatments?   No   Have you had a seizure, or a brain or other nervous system problem?   No   During the past year, have you received a transfusion of blood or blood     products, or been given immune (gamma) globulin or antiviral drug?   No   For women: Are you pregnant or is there a chance you could become        pregnant during the next month?   No   Have you received any vaccinations in the past 4 weeks?   No     Immunization questionnaire was positive for at least one answer.         Per orders of Dr. Adams, injection of Shingrix given by Velvet Fitzpatrick. Patient instructed to remain in clinic for 15 minutes afterwards, and to report any adverse reaction to me immediately.       Screening performed by Velvet Fitzpatrick on 8/23/2018 at 12:07 PM.

## 2018-08-24 LAB
DEPRECATED CALCIDIOL+CALCIFEROL SERPL-MC: <49 UG/L (ref 20–75)
VITAMIN D2 SERPL-MCNC: <5 UG/L
VITAMIN D3 SERPL-MCNC: 44 UG/L

## 2018-08-25 RX ORDER — NYSTATIN 100000 [USP'U]/G
POWDER TOPICAL
Qty: 60 G | Refills: 5 | Status: SHIPPED | OUTPATIENT
Start: 2018-08-25 | End: 2019-07-26

## 2018-08-25 NOTE — PROGRESS NOTES
Your vitamin D level is normal but on the low side.  This is likely to worsen over the winter months with less sun exposure.   I would recommend you continue your current supplement for the summer months but an additional increase in vitamin D supplement by 2000 IU a day for the fall and winter is recommended.  This can be obtained over the counter or as a prescription.  If you desire a prescription, please notify the office and this will be sent to your pharmacy.      Please call or S[employer] message me if you have any questions.      LEO

## 2018-08-25 NOTE — PROGRESS NOTES
Your urine testing is normal.  There is not elevated protein present.  Please call or MyChart message me if you have any questions.   PSK

## 2018-10-22 ENCOUNTER — ALLIED HEALTH/NURSE VISIT (OUTPATIENT)
Dept: NURSING | Facility: CLINIC | Age: 73
End: 2018-10-22
Payer: COMMERCIAL

## 2018-10-22 DIAGNOSIS — Z23 NEED FOR PROPHYLACTIC VACCINATION AND INOCULATION AGAINST INFLUENZA: Primary | ICD-10-CM

## 2018-10-22 PROCEDURE — G0008 ADMIN INFLUENZA VIRUS VAC: HCPCS

## 2018-10-22 PROCEDURE — 90686 IIV4 VACC NO PRSV 0.5 ML IM: CPT

## 2018-10-22 PROCEDURE — 99207 ZZC NO CHARGE NURSE ONLY: CPT

## 2018-10-22 NOTE — PROGRESS NOTES
Injectable Influenza Immunization Documentation    1.  Is the person to be vaccinated sick today?   No    2. Does the person to be vaccinated have an allergy to a component   of the vaccine?   No  Egg Allergy Algorithm Link    3. Has the person to be vaccinated ever had a serious reaction   to influenza vaccine in the past?   No    4. Has the person to be vaccinated ever had Guillain-Barré syndrome?   No    Form completed by Velvet Fitzpatrick MA on 10/22/2018 at 10:32 AM    Patient refused 65+ flu shot.

## 2018-10-22 NOTE — MR AVS SNAPSHOT
After Visit Summary   10/22/2018    Monica Samayoa    MRN: [id number]           Patient Information     Date Of Birth          1945        Visit Information        Provider Department      10/22/2018 10:20 AM BA ANCILLARY [facility]        Today's Diagnoses     Need for prophylactic vaccination and inoculation against influenza    -  1       Follow-ups after your visit        Who to contact     If you have questions or need follow up information about today's clinic visit or your schedule please contact [facility] directly at [phone].  Normal or non-critical lab and imaging results will be communicated to you by [employer], letter or phone within 4 business days after the clinic has received the results. If you do not hear from us within 7 days, please contact the clinic through [employer] or phone. If you have a critical or abnormal lab result, we will notify you by phone as soon as possible.  Submit refill requests through [employer] or call your pharmacy and they will forward the refill request to us. Please allow 3 business days for your refill to be completed.          Additional Information About Your Visit        MyChart Information     [employer] gives you secure access to your electronic health record. If you see a primary care provider, you can also send messages to your care team and make appointments. If you have questions, please call your primary care clinic.  If you do not have a primary care provider, please call [phone] and they will assist you.        Care EveryWhere ID     This is your Care EveryWhere ID. This could be used by other organizations to access your [place] medical records  [id number]        Your Vitals Were     Last Period                   (LMP Unknown)            Blood Pressure from Last 3 Encounters:   08/23/18 134/78   06/28/18 128/78   09/22/17 132/78    Weight from Last 3 Encounters:   08/23/18 78 kg (172 lb)   06/28/18 79.8 kg  (176 lb)   09/22/17 80.6 kg (177 lb 9.6 oz)              We Performed the Following     FLU VAC, SPLIT VIRUS IM > 3 YO (QUADRIVALENT) [35172]     Vaccine Administration, Initial [86813]        Primary Care Provider Office Phone # Fax #    Heather Adams [phone] [address]  [facility] [address]        Equal Access to Services     [facility] CenterIV[facility]TE : Hadii aad ku hadasho Soomaali, waaxda luqadaha, qaybta kaalmada adeegyada, waxay idiin hayaan adeeg santhoshnaman lajennifer . So [facility] [phone].    ATENCIÓN: Si ольга casillas, tiene a chambers disposición servicios gratuitos de asistencia lingüística. Kevenjatinder al [phone].    We comply with applicable federal civil rights laws and [place] laws. We do not discriminate on the basis of race, color, national origin, age, disability, sex, sexual orientation, or gender identity.            Thank you!     Thank you for choosing [facility]  for your care. Our goal is always to provide you with excellent care. Hearing back from our patients is one way we can continue to improve our services. Please take a few minutes to complete the written survey that you may receive in the mail after your visit with us. Thank you!             Your Updated Medication List - Protect others around you: Learn how to safely use, store and throw away your medicines at www.disposemymeds.org.          This list is accurate as of 10/22/18 10:33 AM.  Always use your most recent med list.                   Brand Name Dispense Instructions for use Diagnosis    aspirin 81 MG tablet      Take  by mouth daily. *        calcium 500/D 500-200 MG-UNIT per tablet   Generic drug:  calcium carb 1250 mg (500 mg Council)/vitamin D 200 unit      Take  by mouth.        CO Q-10 PLUS RED YEAST RICE PO      Take 100 mg by mouth daily.        FLONASE 50 MCG/ACT spray   Generic drug:  fluticasone      Spray 1-2 sprays into both nostrils daily    Chronic rhinitis       losartan 50 MG  tablet    COZAAR    90 tablet    Take 1 tablet (50 mg) by mouth daily    Hypertension goal BP (blood pressure) < 140/90       Lutein 15-0.7 MG Caps      Take  by mouth.        nystatin 923079 UNIT/GM Powd    MYCOSTATIN    60 g    Apply powder liberally to groin and under breasts TID with rash and once daily for prevention    Candidal intertrigo       RA CETIRIZINE 10 MG tablet   Generic drug:  cetirizine      Take 10 mg by mouth Once Daily.        simvastatin 40 MG tablet    ZOCOR    90 tablet    Take 1 tablet (40 mg) by mouth At Bedtime    Hyperlipidemia LDL goal <100       vitamin B complex with vitamin C Tabs tablet      Take 1 Cap by mouth daily.        vitamin C 500 MG Chew      1 TABLET DAILY        vitamin D3 2000 units Caps      Take  by mouth daily.

## 2018-11-19 ENCOUNTER — ALLIED HEALTH/NURSE VISIT (OUTPATIENT)
Dept: NURSING | Facility: CLINIC | Age: 73
End: 2018-11-19
Payer: COMMERCIAL

## 2018-11-19 DIAGNOSIS — Z23 NEED FOR SHINGLES VACCINE: Primary | ICD-10-CM

## 2018-11-19 PROCEDURE — 90750 HZV VACC RECOMBINANT IM: CPT

## 2018-11-19 PROCEDURE — 99207 ZZC NO CHARGE NURSE ONLY: CPT

## 2018-11-19 PROCEDURE — 90471 IMMUNIZATION ADMIN: CPT

## 2018-11-19 NOTE — MR AVS SNAPSHOT
After Visit Summary   11/19/2018    Monica Samayoa    MRN: [id number]           Patient Information     Date Of Birth          1945        Visit Information        Provider Department      11/19/2018 1:20 PM BA ANCILLARY [facility]        Today's Diagnoses     Need for shingles vaccine    -  1       Follow-ups after your visit        Who to contact     If you have questions or need follow up information about today's clinic visit or your schedule please contact [facility] directly at [phone].  Normal or non-critical lab and imaging results will be communicated to you by [employer], letter or phone within 4 business days after the clinic has received the results. If you do not hear from us within 7 days, please contact the clinic through [employer] or phone. If you have a critical or abnormal lab result, we will notify you by phone as soon as possible.  Submit refill requests through [employer] or call your pharmacy and they will forward the refill request to us. Please allow 3 business days for your refill to be completed.          Additional Information About Your Visit        MyChart Information     [employer] gives you secure access to your electronic health record. If you see a primary care provider, you can also send messages to your care team and make appointments. If you have questions, please call your primary care clinic.  If you do not have a primary care provider, please call [phone] and they will assist you.        Care EveryWhere ID     This is your Care EveryWhere ID. This could be used by other organizations to access your [place] medical records  [id number]        Your Vitals Were     Last Period                   (LMP Unknown)            Blood Pressure from Last 3 Encounters:   08/23/18 134/78   06/28/18 128/78   09/22/17 132/78    Weight from Last 3 Encounters:   08/23/18 78 kg (172 lb)   06/28/18 79.8 kg (176 lb)   09/22/17 80.6 kg (177 lb 9.6 oz)               We Performed the Following     ADMIN 1st VACCINE     ZOSTER VACCINE RECOMBINANT ADJUVANTED IM NJX        Primary Care Provider Office Phone # Fax #    Heather Adams [phone] [address]  [facility] [address]        Equal Access to Services     OWENANDI LARRY : Hadii aad ku hadasho Soomaali, waaxda luqadaha, qaybta kaalmada adeegyada, waxay idiin hayaan adecesario khjennifer betty reyes. So [facility] [phone].    ATENCIÓN: Si habla español, tiene a chambers disposición servicios gratuitos de asistencia lingüística. Llame al [phone].    We comply with applicable federal civil rights laws and [place] laws. We do not discriminate on the basis of race, color, national origin, age, disability, sex, sexual orientation, or gender identity.            Thank you!     Thank you for choosing [facility]  for your care. Our goal is always to provide you with excellent care. Hearing back from our patients is one way we can continue to improve our services. Please take a few minutes to complete the written survey that you may receive in the mail after your visit with us. Thank you!             Your Updated Medication List - Protect others around you: Learn how to safely use, store and throw away your medicines at www.disposemymeds.org.          This list is accurate as of 11/19/18  1:30 PM.  Always use your most recent med list.                   Brand Name Dispense Instructions for use Diagnosis    aspirin 81 MG tablet      Take  by mouth daily. *        calcium 500/D 500-200 MG-UNIT tablet   Generic drug:  calcium carb 500 mg elemental Ca/vitamin D 200 unit      Take  by mouth.        CO Q-10 PLUS RED YEAST RICE PO      Take 100 mg by mouth daily.        FLONASE 50 MCG/ACT spray   Generic drug:  fluticasone      Spray 1-2 sprays into both nostrils daily    Chronic rhinitis       losartan 50 MG tablet    COZAAR    90 tablet    Take 1 tablet (50 mg) by mouth daily    Hypertension  goal BP (blood pressure) < 140/90       Lutein 15-0.7 MG Caps      Take  by mouth.        nystatin 062617 UNIT/GM Powd    MYCOSTATIN    60 g    Apply powder liberally to groin and under breasts TID with rash and once daily for prevention    Candidal intertrigo       RA CETIRIZINE 10 MG tablet   Generic drug:  cetirizine      Take 10 mg by mouth Once Daily.        simvastatin 40 MG tablet    ZOCOR    90 tablet    Take 1 tablet (40 mg) by mouth At Bedtime    Hyperlipidemia LDL goal <100       vitamin B complex with vitamin C Tabs tablet      Take 1 Cap by mouth daily.        vitamin C 500 MG Chew      1 TABLET DAILY        vitamin D3 2000 units Caps      Take  by mouth daily.

## 2018-11-19 NOTE — NURSING NOTE
Screening Questionnaire for Adult Immunization    Are you sick today?   No   Do you have allergies to medications, food, a vaccine component or latex?   No   Have you ever had a serious reaction after receiving a vaccination?   No   Do you have a long-term health problem with heart disease, lung disease, asthma, kidney disease, metabolic disease (e.g. diabetes), anemia, or other blood disorder?   No   Do you have cancer, leukemia, HIV/AIDS, or any other immune system problem?   Breast cancer 2010   In the past 3 months, have you taken medications that affect  your immune system, such as prednisone, other steroids, or anticancer drugs; drugs for the treatment of rheumatoid arthritis, Crohn s disease, or psoriasis; or have you had radiation treatments?   No   Have you had a seizure, or a brain or other nervous system problem?   No   During the past year, have you received a transfusion of blood or blood     products, or been given immune (gamma) globulin or antiviral drug?   No   For women: Are you pregnant or is there a chance you could become        pregnant during the next month?   No   Have you received any vaccinations in the past 4 weeks?   No     Immunization questionnaire answers were all negative.  Known breast cancer no problem with first shingrix.       Patient instructed to remain in clinic for 15 minutes afterwards, and to report any adverse reaction to me immediately.       Screening performed by Phuong Nix on 11/19/2018 at 1:29 PM.

## 2019-02-20 ENCOUNTER — TELEPHONE (OUTPATIENT)
Dept: FAMILY MEDICINE | Facility: CLINIC | Age: 74
End: 2019-02-20

## 2019-02-20 NOTE — TELEPHONE ENCOUNTER
2/20/2019      [employer], on-boarded.    Annual Wellness: Declined  Specialist: None           Outreach ,  Lenny Carrion

## 2019-03-13 DIAGNOSIS — I10 HYPERTENSION GOAL BP (BLOOD PRESSURE) < 140/90: ICD-10-CM

## 2019-03-13 NOTE — TELEPHONE ENCOUNTER
"Requested Prescriptions   Pending Prescriptions Disp Refills     losartan (COZAAR) 50 MG tablet [Pharmacy Med Name: Losartan Potassium Oral Tablet 50 MG] 90 tablet 0     Sig: TAKE 1 TABLET BY MOUTH ONE TIME DAILY    Angiotensin-II Receptors Passed - 3/13/2019 10:35 AM       Passed - Blood pressure under 140/90 in past 12 months    BP Readings from Last 3 Encounters:   08/23/18 134/78   06/28/18 128/78   09/22/17 132/78                Passed - Recent (12 mo) or future (30 days) visit within the authorizing provider's specialty    Patient had office visit in the last 12 months or has a visit in the next 30 days with authorizing provider or within the authorizing provider's specialty.  See \"Patient Info\" tab in inbasket, or \"Choose Columns\" in Meds & Orders section of the refill encounter.             Passed - Medication is active on med list       Passed - Patient is age 18 or older       Passed - No active pregnancy on record       Passed - Normal serum creatinine on file in past 12 months    Recent Labs   Lab Test 08/22/18  0750   CR 0.75            Passed - Normal serum potassium on file in past 12 months    Recent Labs   Lab Test 08/22/18  0750   POTASSIUM 3.7                   Passed - No positive pregnancy test in past 12 months        losartan (COZAAR) 50 MG tablet  Last Written Prescription Date:  8/23/18  Last Fill Quantity: 90,  # refills: 1   Last office visit: 8/23/2018 with prescribing provider:  Dr. Adams   Future Office Visit:      "

## 2019-03-15 ENCOUNTER — MYC MEDICAL ADVICE (OUTPATIENT)
Dept: FAMILY MEDICINE | Facility: CLINIC | Age: 74
End: 2019-03-15

## 2019-03-15 RX ORDER — LOSARTAN POTASSIUM 50 MG/1
TABLET ORAL
Qty: 30 TABLET | Refills: 0 | Status: SHIPPED | OUTPATIENT
Start: 2019-03-15 | End: 2019-04-10

## 2019-03-15 NOTE — TELEPHONE ENCOUNTER
Routing refill request to provider for review/approval because:  A break in medication  Mary Laurent RN

## 2019-03-15 NOTE — TELEPHONE ENCOUNTER
Given one month.  Visit in august recommended follow up with Dr. Adams in 6 months. No future visit scheduled. Please assist with scheduling follow up with her

## 2019-04-03 ENCOUNTER — MYC MEDICAL ADVICE (OUTPATIENT)
Dept: FAMILY MEDICINE | Facility: CLINIC | Age: 74
End: 2019-04-03

## 2019-04-03 DIAGNOSIS — I10 HYPERTENSION GOAL BP (BLOOD PRESSURE) < 140/90: ICD-10-CM

## 2019-04-10 RX ORDER — LOSARTAN POTASSIUM 50 MG/1
50 TABLET ORAL DAILY
Qty: 90 TABLET | Refills: 1 | Status: SHIPPED | OUTPATIENT
Start: 2019-04-10 | End: 2019-08-01

## 2019-04-11 NOTE — TELEPHONE ENCOUNTER
This writer attempted to contact pt on 04/11/19      Reason for call relay message and left message.      If patient calls back:   Relay message tell pt she has a [employer] message to read., (read verbatim), document that pt called and close encounter        Betty Quesada MA

## 2019-04-11 NOTE — TELEPHONE ENCOUNTER
Please call patient re:  Message sent - not read.  Let her know she has a message on [employer].         JESSK

## 2019-05-14 ENCOUNTER — TRANSFERRED RECORDS (OUTPATIENT)
Dept: HEALTH INFORMATION MANAGEMENT | Facility: CLINIC | Age: 74
End: 2019-05-14

## 2019-05-28 ENCOUNTER — TRANSFERRED RECORDS (OUTPATIENT)
Dept: HEALTH INFORMATION MANAGEMENT | Facility: CLINIC | Age: 74
End: 2019-05-28

## 2019-07-26 ENCOUNTER — DOCUMENTATION ONLY (OUTPATIENT)
Dept: LAB | Facility: CLINIC | Age: 74
End: 2019-07-26

## 2019-07-26 DIAGNOSIS — E03.8 SUBCLINICAL HYPOTHYROIDISM: ICD-10-CM

## 2019-07-26 DIAGNOSIS — B37.2 CANDIDAL INTERTRIGO: ICD-10-CM

## 2019-07-26 DIAGNOSIS — E78.5 HYPERLIPIDEMIA LDL GOAL <100: ICD-10-CM

## 2019-07-26 DIAGNOSIS — R79.89 ELEVATED TSH: Primary | ICD-10-CM

## 2019-07-26 DIAGNOSIS — I10 ESSENTIAL HYPERTENSION WITH GOAL BLOOD PRESSURE LESS THAN 140/90: ICD-10-CM

## 2019-07-26 DIAGNOSIS — D50.9 IRON DEFICIENCY ANEMIA, UNSPECIFIED IRON DEFICIENCY ANEMIA TYPE: ICD-10-CM

## 2019-07-26 RX ORDER — NYSTATIN 100000 [USP'U]/G
POWDER TOPICAL
Qty: 60 G | Refills: 1 | Status: SHIPPED | OUTPATIENT
Start: 2019-07-26 | End: 2019-08-01

## 2019-07-26 NOTE — TELEPHONE ENCOUNTER
Prescription approved per [facility] Refill Protocol.  Rubia Antonio RN

## 2019-07-26 NOTE — TELEPHONE ENCOUNTER
"Requested Prescriptions   Pending Prescriptions Disp Refills     nystatin (MYCOSTATIN) 932672 UNIT/GM external powder [Pharmacy Med Name: Nystatin External Powder 244958 UNIT/GM]  Last Written Prescription Date:  8/25/18  Last Fill Quantity: 60 g,  # refills: 5  Last office visit: 8/23/2018 with prescribing provider:  Dr. Adams   Future Office Visit:   Next 5 appointments (look out 90 days)    Aug 01, 2019 11:20 AM CDT  PHYSICAL with Heather Adams MD  [facility] ([address] [address] [phone]          60 g 4     Sig: apply powder liberally to groin and under breasts three times daily with rash and once daily for prevention       Antifungal Agents Passed - 7/26/2019 12:22 PM        Passed - Recent (12 mo) or future (30 days) visit within the authorizing provider's specialty     Patient had office visit in the last 12 months or has a visit in the next 30 days with authorizing provider or within the authorizing provider's specialty.  See \"Patient Info\" tab in inbasket, or \"Choose Columns\" in Meds & Orders section of the refill encounter.              Passed - Not Fluconazole or Terconazole      If oral Fluconazole or Terconazole, may refill if indicated in progress notes.           Passed - Medication is active on med list          "

## 2019-07-26 NOTE — PROGRESS NOTES
PT HAS A PREVISIT LAB APPOINTMENT ON 7/30/19 PLEASE REVIEW CHART AND ADD ORDERS IF NECESSARY.    THANK YOU,    [facility]

## 2019-07-30 DIAGNOSIS — E03.8 SUBCLINICAL HYPOTHYROIDISM: ICD-10-CM

## 2019-07-30 DIAGNOSIS — E78.5 HYPERLIPIDEMIA LDL GOAL <100: ICD-10-CM

## 2019-07-30 DIAGNOSIS — R79.89 ELEVATED TSH: ICD-10-CM

## 2019-07-30 DIAGNOSIS — I10 ESSENTIAL HYPERTENSION WITH GOAL BLOOD PRESSURE LESS THAN 140/90: ICD-10-CM

## 2019-07-30 DIAGNOSIS — D50.9 IRON DEFICIENCY ANEMIA, UNSPECIFIED IRON DEFICIENCY ANEMIA TYPE: ICD-10-CM

## 2019-07-30 DIAGNOSIS — E55.9 VITAMIN D DEFICIENCY: ICD-10-CM

## 2019-07-30 LAB
ALBUMIN SERPL-MCNC: 4.2 G/DL (ref 3.4–5)
ALP SERPL-CCNC: 52 U/L (ref 40–150)
ALT SERPL W P-5'-P-CCNC: 39 U/L (ref 0–50)
ANION GAP SERPL CALCULATED.3IONS-SCNC: 7 MMOL/L (ref 3–14)
AST SERPL W P-5'-P-CCNC: 22 U/L (ref 0–45)
BILIRUB SERPL-MCNC: 0.6 MG/DL (ref 0.2–1.3)
BUN SERPL-MCNC: 18 MG/DL (ref 7–30)
CALCIUM SERPL-MCNC: 9.6 MG/DL (ref 8.5–10.1)
CHLORIDE SERPL-SCNC: 108 MMOL/L (ref 94–109)
CHOLEST SERPL-MCNC: 154 MG/DL
CO2 SERPL-SCNC: 24 MMOL/L (ref 20–32)
CREAT SERPL-MCNC: 0.84 MG/DL (ref 0.52–1.04)
CREAT UR-MCNC: 155 MG/DL
ERYTHROCYTE [DISTWIDTH] IN BLOOD BY AUTOMATED COUNT: 13.7 % (ref 10–15)
GFR SERPL CREATININE-BSD FRML MDRD: 69 ML/MIN/{1.73_M2}
GLUCOSE SERPL-MCNC: 105 MG/DL (ref 70–99)
HCT VFR BLD AUTO: 41.8 % (ref 35–47)
HDLC SERPL-MCNC: 47 MG/DL
HGB BLD-MCNC: 13.7 G/DL (ref 11.7–15.7)
LDLC SERPL CALC-MCNC: 74 MG/DL
MCH RBC QN AUTO: 29.2 PG (ref 26.5–33)
MCHC RBC AUTO-ENTMCNC: 32.8 G/DL (ref 31.5–36.5)
MCV RBC AUTO: 89 FL (ref 78–100)
MICROALBUMIN UR-MCNC: 31 MG/L
MICROALBUMIN/CREAT UR: 19.87 MG/G CR (ref 0–25)
NONHDLC SERPL-MCNC: 107 MG/DL
PLATELET # BLD AUTO: 224 10E9/L (ref 150–450)
POTASSIUM SERPL-SCNC: 4 MMOL/L (ref 3.4–5.3)
PROT SERPL-MCNC: 7.3 G/DL (ref 6.8–8.8)
RBC # BLD AUTO: 4.69 10E12/L (ref 3.8–5.2)
SODIUM SERPL-SCNC: 139 MMOL/L (ref 133–144)
TRIGL SERPL-MCNC: 167 MG/DL
TSH SERPL DL<=0.005 MIU/L-ACNC: 2.54 MU/L (ref 0.4–4)
WBC # BLD AUTO: 5.4 10E9/L (ref 4–11)

## 2019-07-30 PROCEDURE — 80053 COMPREHEN METABOLIC PANEL: CPT | Performed by: FAMILY MEDICINE

## 2019-07-30 PROCEDURE — 82043 UR ALBUMIN QUANTITATIVE: CPT | Performed by: FAMILY MEDICINE

## 2019-07-30 PROCEDURE — 84443 ASSAY THYROID STIM HORMONE: CPT | Performed by: FAMILY MEDICINE

## 2019-07-30 PROCEDURE — 85027 COMPLETE CBC AUTOMATED: CPT | Performed by: FAMILY MEDICINE

## 2019-07-30 PROCEDURE — 82306 VITAMIN D 25 HYDROXY: CPT | Performed by: FAMILY MEDICINE

## 2019-07-30 PROCEDURE — 80061 LIPID PANEL: CPT | Performed by: FAMILY MEDICINE

## 2019-07-30 PROCEDURE — 36415 COLL VENOUS BLD VENIPUNCTURE: CPT | Performed by: FAMILY MEDICINE

## 2019-08-01 ENCOUNTER — OFFICE VISIT (OUTPATIENT)
Dept: FAMILY MEDICINE | Facility: CLINIC | Age: 74
End: 2019-08-01
Payer: COMMERCIAL

## 2019-08-01 VITALS
HEIGHT: 57 IN | TEMPERATURE: 97.4 F | DIASTOLIC BLOOD PRESSURE: 72 MMHG | OXYGEN SATURATION: 96 % | SYSTOLIC BLOOD PRESSURE: 132 MMHG | BODY MASS INDEX: 37.71 KG/M2 | HEART RATE: 61 BPM | WEIGHT: 174.8 LBS

## 2019-08-01 DIAGNOSIS — Z00.00 ROUTINE GENERAL MEDICAL EXAMINATION AT A HEALTH CARE FACILITY: Primary | ICD-10-CM

## 2019-08-01 DIAGNOSIS — M54.9 UPPER BACK PAIN: ICD-10-CM

## 2019-08-01 DIAGNOSIS — I10 HYPERTENSION GOAL BP (BLOOD PRESSURE) < 140/90: ICD-10-CM

## 2019-08-01 DIAGNOSIS — E78.5 HYPERLIPIDEMIA LDL GOAL <100: ICD-10-CM

## 2019-08-01 DIAGNOSIS — E66.01 MORBID OBESITY DUE TO EXCESS CALORIES (H): ICD-10-CM

## 2019-08-01 DIAGNOSIS — B37.2 CANDIDAL INTERTRIGO: ICD-10-CM

## 2019-08-01 LAB — DEPRECATED CALCIDIOL+CALCIFEROL SERPL-MC: 34 UG/L (ref 20–75)

## 2019-08-01 PROCEDURE — 99397 PER PM REEVAL EST PAT 65+ YR: CPT | Performed by: FAMILY MEDICINE

## 2019-08-01 RX ORDER — SIMVASTATIN 40 MG
40 TABLET ORAL AT BEDTIME
Qty: 90 TABLET | Refills: 3 | Status: SHIPPED | OUTPATIENT
Start: 2019-08-01 | End: 2020-09-03

## 2019-08-01 RX ORDER — LOSARTAN POTASSIUM 50 MG/1
50 TABLET ORAL DAILY
Qty: 90 TABLET | Refills: 3 | Status: SHIPPED | OUTPATIENT
Start: 2019-08-01 | End: 2020-09-03

## 2019-08-01 RX ORDER — LOSARTAN POTASSIUM 50 MG/1
50 TABLET ORAL DAILY
Qty: 90 TABLET | Refills: 1 | Status: SHIPPED | OUTPATIENT
Start: 2019-08-01 | End: 2019-08-01

## 2019-08-01 RX ORDER — NYSTATIN 100000 [USP'U]/G
POWDER TOPICAL
Qty: 60 G | Refills: 1 | Status: SHIPPED | OUTPATIENT
Start: 2019-08-01 | End: 2020-03-03

## 2019-08-01 ASSESSMENT — PAIN SCALES - GENERAL: PAINLEVEL: NO PAIN (0)

## 2019-08-01 ASSESSMENT — MIFFLIN-ST. JEOR: SCORE: 1170.02

## 2019-08-01 NOTE — PROGRESS NOTES
"  SUBJECTIVE:   Monica Samayoa is a 73 year old female who presents for Preventive Visit.  Are you in the first 12 months of your Medicare Part B coverage?  No    Physical Health:    Rash  -Reports that Nystatin worked well, but that the rash is getting worse. Now using twice daily. Believes that powder works better than creams to treat. Reported previously taking an antifungal tablet to treat. States that wearing exercise bra (with underwire) may exacerbate the rash.     Back pain  -States that she has upper back pain (stiffness) with limited range of motion with upper arms. Inability to move arms straight up. Attempted to walk \"straight\" and stated that this helped. Denies pain shooting down arms, says its limited to back. When she sleeps on right side there is pain, but not if she lays on the left. Has concerns of scoliosis.     -HME  Up-to-date on immunizations.   -Mammogram is up-to-date. Nothing abnormal.   -Colonoscopy is up-to-date.   -Cholesterol screening was done today. LDL levels are good.   -Blood Glucose: reports that while slightly higher, she has maintained the same range for the past 5 years.   -Gained 2 lbs since last visit.        In general, how would you rate your overall physical health? fair    Outside of work, how many days during the week do you exercise? 2-3 days/week    Outside of work, approximately how many minutes a day do you exercise?15-30 minutes    If you drink alcohol do you typically have >3 drinks per day or >7 drinks per week? No    Do you usually eat at least 4 servings of fruit and vegetables a day, include whole grains & fiber and avoid regularly eating high fat or \"junk\" foods? Yes    Do you have any problems taking medications regularly?  No    Do you have any side effects from medications? none    Needs assistance for the following daily activities: no assistance needed    Which of the following safety concerns are present in your home?  none identified     Hearing " impairment: No    In the past 6 months, have you been bothered by leaking of urine? no    Mental Health:    In general, how would you rate your overall mental or emotional health? good  PHQ-2 Score: 0    Do you feel safe in your environment? Yes    Do you have a Health Care Directive? Yes: Advance Directive has been received and scanned.    Additional concerns to address?  No    Fall risk:  Fallen 2 or more times in the past year?: No  Any fall with injury in the past year?: No    Cognitive Screenin) Repeat 3 items (Leader, Season, Table)    2) Clock draw: NORMAL  3) 3 item recall: Recalls 3 objects  Results: 3 items recalled: COGNITIVE IMPAIRMENT LESS LIKELY    Mini-CogTM Copyright S Mark. Licensed by the author for use in [facility]; reprinted with permission (celine@[facility]). All rights reserved.      Do you have sleep apnea, excessive snoring or daytime drowsiness?: yes            Reviewed and updated as needed this visit by clinical staff  Tobacco  Allergies  Meds  Med Hx  Surg Hx  Fam Hx  Soc Hx        Reviewed and updated as needed this visit by Provider  Tobacco  Allergies  Meds  Med Hx  Surg Hx  Fam Hx  Soc Hx       Social History     Tobacco Use     Smoking status: Never Smoker     Smokeless tobacco: Never Used   Substance Use Topics     Alcohol use: Yes     Comment: 1-2  glasses of wine a week                           Current providers sharing in care for this patient include:   Patient Care Team:  Heather Adams MD as PCP - General (Family Practice)  Heather Adams MD as Assigned PCP    The following health maintenance items are reviewed in Epic and correct as of today:  Health Maintenance   Topic Date Due     ADVANCE CARE PLANNING  2016     MEDICARE ANNUAL WELLNESS VISIT  2019     INFLUENZA VACCINE (1) 2019     FALL RISK ASSESSMENT  2020     MAMMO SCREENING  07/15/2021     COLONOSCOPY  2023     LIPID  2024     DTAP/TDAP/TD  "IMMUNIZATION (3 - Td) 08/14/2027     DEXA  Completed     HEPATITIS C SCREENING  Completed     PHQ-2  Completed     ZOSTER IMMUNIZATION  Completed     IPV IMMUNIZATION  Aged Out     MENINGITIS IMMUNIZATION  Aged Out         ROS:  10 point ROS of systems including Constitutional, Eyes, Respiratory, Cardiovascular, Gastroenterology, Genitourinary, Integumentary, Muscularskeletal, Psychiatric were all negative except for pertinent positives noted in my HPI.      This document serves as a record of the services and decisions personally performed and made by Heather Adams MD. It was created on her behalf by Marques Ngo, a trained medical scribe. The creation of this document is based on the provider's statements to the medical scribe.  Marques Ngo August 1, 2019 11:34 AM      OBJECTIVE:   /72 (BP Location: Right arm, Patient Position: Chair, Cuff Size: Adult Regular)   Pulse 61   Temp 97.4  F (36.3  C) (Tympanic)   Ht 1.437 m (4' 8.58\")   Wt 79.3 kg (174 lb 12.8 oz)   LMP  (Exact Date)   SpO2 96%   Breastfeeding? No   BMI 38.40 kg/m   Estimated body mass index is 38.4 kg/m  as calculated from the following:    Height as of this encounter: 1.437 m (4' 8.58\").    Weight as of this encounter: 79.3 kg (174 lb 12.8 oz).  EXAM:   GENERAL APPEARANCE: obese, alert and no distress  EYES: Eyes grossly normal to inspection, PERRL and conjunctivae and sclerae normal  HENT: ear canals and TM's normal, nose and mouth without ulcers or lesions, oropharynx clear and oral mucous membranes moist  NECK: no adenopathy, no asymmetry, masses, or scars and thyroid normal to palpation  RESP: lungs clear to auscultation - no rales, rhonchi or wheezes  BREAST: normal without masses, tenderness or nipple discharge and no palpable axillary masses or adenopathy  CV: regular rate and rhythm, normal S1 S2, no S3 or S4, no murmur, click or rub, no peripheral edema and peripheral pulses strong  ABDOMEN: soft, nontender, no " "hepatosplenomegaly, no masses and bowel sounds normal  MS: musculoskeletal defects are noted; abnormal- tenderness, paravertebral muscles upper to middle thoracic, full range of motion in shoulders   SKIN: no suspicious lesions; abnormal - erythematous raised  rash beneath breasts bilaterally; no skin breakdown or scaling, mild induration noted.   Beneath the abdominal pannus midline erythema mild   NEURO: Normal strength and tone, sensory exam grossly normal, mentation intact and speech normal  PSYCH: mentation appears normal and affect normal/bright    Diagnostic Test Results:  Labs reviewed in Epic    ASSESSMENT / PLAN:   1. Routine general medical examination at a health care facility  Reviewed recent results -   Your thyroid testing is normal.   Your cholesterol panel is similar to previous with LDL under good control.  The triglyceride level is borderline elevated which may be lowered with exercise and limiting sugars and carbs.   Your blood sugar is borderline elevated.  This is in the \"prediabetic\" range.  Exercise and limiting carbohydrate and sugars in diet can be helpful at preventing progression to diabetes.   Your kidney and liver testing are normal.   Your urine testing is normal.   Your blood cell counts are normal.   Please call or Geckohart message me if you have any questions.     PSK       2. Hypertension goal BP (blood pressure) < 140/90  BP controlled.  Continue current medication.    - losartan (COZAAR) 50 MG tablet; Take 1 tablet (50 mg) by mouth daily  Dispense: 90 tablet; Refill: 3    3. Candidal intertrigo  Discussed continued topical vs oral treatment.  Report current redness related to use of sports bra recently.  Generally can get clear with the powder.  Continue this treatment.  - nystatin (MYCOSTATIN) 471938 UNIT/GM external powder; apply powder liberally to groin and under breasts three times daily with rash and once daily for prevention  Dispense: 60 g; Refill: 1    4. Hyperlipidemia " "LDL goal <100  Refill statin for cholesterol control.  - simvastatin (ZOCOR) 40 MG tablet; Take 1 tablet (40 mg) by mouth At Bedtime  Dispense: 90 tablet; Refill: 3    5. Upper back pain  PHYSICAL THERAPY recommended.  - [facility] REFERRAL; Future    6. Morbid obesity due to excess calories (H)  Wt Readings from Last 5 Encounters:   08/01/19 79.3 kg (174 lb 12.8 oz)   08/23/18 78 kg (172 lb)   06/28/18 79.8 kg (176 lb)   09/22/17 80.6 kg (177 lb 9.6 oz)   08/14/17 80.8 kg (178 lb 1.6 oz)   continue exercise program.      End of Life Planning:  Patient currently has an advanced directive: Yes.  Practitioner is supportive of decision.    COUNSELING:  Reviewed preventive health counseling, as reflected in patient instructions       Regular exercise       Hearing screening       Bladder control       Fall risk prevention       Immunizations       Advanced Planning     Estimated body mass index is 38.4 kg/m  as calculated from the following:    Height as of this encounter: 1.437 m (4' 8.58\").    Weight as of this encounter: 79.3 kg (174 lb 12.8 oz).    Weight management plan: Discussed healthy diet and exercise guidelines     reports that she has never smoked. She has never used smokeless tobacco.      Appropriate preventive services were discussed with this patient, including applicable screening as appropriate for cardiovascular disease, diabetes, osteopenia/osteoporosis, and glaucoma.  As appropriate for age/gender, discussed screening for colorectal cancer, prostate cancer, breast cancer, and cervical cancer. Checklist reviewing preventive services available has been given to the patient.    Reviewed patients plan of care and provided an AVS. The Basic Care Plan (routine screening as documented in Health Maintenance) for Monica meets the Care Plan requirement. This Care Plan has been established and reviewed with the Patient.    Counseling Resources:  ATP IV Guidelines  Pooled Cohorts Equation " Calculator  Breast Cancer Risk Calculator  FRAX Risk Assessment  ICSI Preventive Guidelines  Dietary Guidelines for Americans, 2010  USDA's MyPlate  ASA Prophylaxis  Lung CA Screening      The information in this document, created by the medical scribe, Marques Ngo, for me, accurately reflects the services I personally performed and the decisions made by me. I have reviewed and approved this document for accuracy prior to leaving the patient care area.    Heather Adams MD  [facility]

## 2019-08-01 NOTE — RESULT ENCOUNTER NOTE
"Your thyroid testing is normal.  Your cholesterol panel is similar to previous with LDL under good control.  The triglyceride level is borderline elevated which may be lowered with exercise and limiting sugars and carbs.  Your blood sugar is borderline elevated.  This is in the \"prediabetic\" range.  Exercise and limiting carbohydrate and sugars in diet can be helpful at preventing progression to diabetes.  Your kidney and liver testing are normal.  Your urine testing is normal.  Your blood cell counts are normal.  Please call or MyChart message me if you have any questions.    PSK  "

## 2019-08-01 NOTE — PATIENT INSTRUCTIONS
I recommend changing exercise clothing to help treat the rash. Wearing loose, cotton clothing will help the rash breathe. I will prescribe Nystatin again and if you don't see improvements, we can discuss another treatment plan. Follow-up as needed.     I will recommend a physical therapist to help with the upper back pain and limited range of motion in your upper arms.       Refill meds today.      Preventive Health Recommendations    See your health care provider every year to    Review health changes.     Discuss preventive care.      Review your medicines if your doctor has prescribed any.      You no longer need a yearly Pap test unless you've had an abnormal Pap test in the past 10 years. If you have vaginal symptoms, such as bleeding or discharge, be sure to talk with your provider about a Pap test.      Every 1 to 2 years, have a mammogram.  If you are over 69, talk with your health care provider about whether or not you want to continue having screening mammograms.      Every 10 years, have a colonoscopy. Or, have a yearly FIT test (stool test). These exams will check for colon cancer.       Have a cholesterol test every 5 years, or more often if your doctor advises it.       Have a diabetes test (fasting glucose) every three years. If you are at risk for diabetes, you should have this test more often.       At age 65, have a bone density scan (DEXA) to check for osteoporosis (brittle bone disease).    Shots:    Get a flu shot each year.    Get a tetanus shot every 10 years.    Talk to your doctor about your pneumonia vaccines. There are now two you should receive - Pneumovax (PPSV 23) and Prevnar (PCV 13).    Talk to your pharmacist about the shingles vaccine.    Talk to your doctor about the hepatitis B vaccine.    Nutrition:     Eat at least 5 servings of fruits and vegetables each day.      Eat whole-grain bread, whole-wheat pasta and brown rice instead of white grains and rice.      Get adequate about  Calcium and Vitamin D.     Lifestyle    Exercise at least 150 minutes a week (30 minutes a day, 5 days a week). This will help you control your weight and prevent disease.      Limit alcohol to one drink per day.      No smoking.       Wear sunscreen to prevent skin cancer.       See your dentist twice a year for an exam and cleaning.      See your eye doctor every 1 to 2 years to screen for conditions such as glaucoma, macular degeneration, cataracts, etc.    Personalized Prevention Plan  You are due for the preventive services outlined below.  Your care team is available to assist you in scheduling these services.  If you have already completed any of these items, please share that information with your care team to update in your medical record.    Health Maintenance Due   Topic Date Due     Discuss Advance Care Planning  07/12/2016     Annual Wellness Visit  08/23/2019     FALL RISK ASSESSMENT  08/23/2019

## 2019-08-03 NOTE — RESULT ENCOUNTER NOTE
Your vitamin D level is normal but on the low side.  This is likely to worsen over the winter months with less sun exposure.   I would recommend you continue your current supplement for the summer months but an additional increase in vitamin D supplement by 2000 IU a day for the fall and winter is recommended.  This can be obtained over the counter or as a prescription.  If you desire a prescription, please notify the office and this will be sent to your pharmacy.      Please call or EnSolhart message me if you have any questions.      LEO

## 2019-08-08 ENCOUNTER — THERAPY VISIT (OUTPATIENT)
Dept: PHYSICAL THERAPY | Facility: CLINIC | Age: 74
End: 2019-08-08
Payer: COMMERCIAL

## 2019-08-08 DIAGNOSIS — M54.6 ACUTE BILATERAL THORACIC BACK PAIN: ICD-10-CM

## 2019-08-08 DIAGNOSIS — M54.9 UPPER BACK PAIN: ICD-10-CM

## 2019-08-08 PROCEDURE — 97161 PT EVAL LOW COMPLEX 20 MIN: CPT | Mod: GP | Performed by: PHYSICAL THERAPIST

## 2019-08-08 PROCEDURE — 97140 MANUAL THERAPY 1/> REGIONS: CPT | Mod: GP | Performed by: PHYSICAL THERAPIST

## 2019-08-08 PROCEDURE — 97112 NEUROMUSCULAR REEDUCATION: CPT | Mod: GP | Performed by: PHYSICAL THERAPIST

## 2019-08-08 NOTE — PROGRESS NOTES
[place] [facility] Initial Evaluation  Subjective:  The history is provided by the patient. No  was used.   Type of problem:  Thoracic spine   Condition occurred with:  Insidious onset. This is a new condition   Problem details: Monica began noticing stiffness in upper back when returned to gym and noticed stiffness when lying on back. She goes to the gym 1 x week for class and walks outdoors regularly 2-3 miles three times per week. .   Patient reports pain:  Thoracic left side and thoracic right side.   Exacerbated by: lying on back on ground supine and sleeping on right side  Relieved by: foam roller.    Monica Samayoa being seen for Thoracic Spine Pain .   Date of Onset: MD referral 8-1-19. Where condition occurred: for unknown reasons.Problem occurred: Unknown   and reported as 2/10 on pain scale. General health as reported by patient is good.            Pain is described as aching  Pain is the same all the time. Since onset symptoms are unchanged. Imaging testing: no recent imaging thoracic spine. Previous treatment includes physical therapy. There was moderate improvement following previous treatment.        Red flags:  None as reported by patient.                      Objective:  Standing Alignment:    Cervical/Thoracic:  Thoracic kyphosis decreased and forward head  Shoulder/UE:  Rounded shoulders, protracted scapula L and protracted scapula R              Gait:    Gait Type:  Normal   Assistive Devices:  None      Flexibility/Screens:   Positive screens:  Thoracic  Upper Extremity:    Decreased left upper extremity flexibility at:  Pectoralis Major; Pectoralis Minor and Latissimus    Decreased right upper extremity flexibility present at:  Pectoralis Major and Latissimus    Spine:  Decreased left spine flexibility:  Upper Trap and Levator    Decreased right spine flexibility:  Upper Trap and Levator                  Cervical/Thoracic Evaluation    AROM:  AROM  Cervical:    Flexion:           WNL w/o pain   Extension:       50% w/o pain   Rotation:         Left: 75% w/o pain      Right: 75% w/o pain   Side Bend:      Left: 50% w/o pain     Right:   AROM Thoracic:    Flexion:               Hands to Knees   Extension:          50%  Rotation:            Left: 50%     Right: 50%    Strength: Poor Scapular Strength  Headaches: none  Cervical Myotomes:  normal                  DTR's:  not assessed          Cervical Dermatomes:  not assessed                    Cervical Palpation:    Tenderness present at Left:    Rhomboids; Upper Trap; Levator and Erector Spinae  Tenderness present at Right:    Rhomboids; Upper Trap; Levator and Erector Spinae  Functional Tests:  not assessed    Cervical Stability/Joint Clearing:      Left negative at: 1st Rib    Right negative at:  1st Rib    Cord Sign:  not assessed         Shoulder Evaluation:  ROM:  AROM:    Flexion:  Left:  130    Right:  130                                                                           General     ROS    Assessment/Plan:    Patient is a 73 year old female with thoracic complaints.    Patient has the following significant findings with corresponding treatment plan.                Diagnosis 1:  Thoracic Kyphosis (poor posture)    Pain -  manual therapy, self management, education, home program and modalities PRN  Decreased ROM/flexibility - manual therapy, therapeutic exercise and home program  Decreased joint mobility - manual therapy, therapeutic exercise and home program  Decreased strength - therapeutic exercise, therapeutic activities and home program  Inflammation - self management/home program  Impaired muscle performance - neuro re-education and home program  Decreased function - therapeutic activities and home program  Impaired posture - neuro re-education and home program    Therapy Evaluation Codes:   1) History comprised of:   Personal factors that impact the plan of care:      None.    Comorbidity  factors that impact the plan of care are:      Cancer, High blood pressure, Menopausal and Overweight.     Medications impacting care: High blood pressure and Statin.  2) Examination of Body Systems comprised of:   Body structures and functions that impact the plan of care:      Thoracic Spine.   Activity limitations that impact the plan of care are:      Sleeping and Laying down.  3) Clinical presentation characteristics are:   Stable/Uncomplicated.  4) Decision-Making    Low complexity using standardized patient assessment instrument and/or measureable assessment of functional outcome.  Cumulative Therapy Evaluation is: Low complexity.    Previous and current functional limitations:  (See Goal Flow Sheet for this information)    Short term and Long term goals: (See Goal Flow Sheet for this information)     Communication ability:  Patient appears to be able to clearly communicate and understand verbal and written communication and follow directions correctly.  Treatment Explanation - The following has been discussed with the patient:   RX ordered/plan of care  Anticipated outcomes  Possible risks and side effects  This patient would benefit from PT intervention to resume normal activities.   Rehab potential is good.    Frequency:  1 X week, once daily  Duration:  for 6 weeks  Discharge Plan:  Achieve all LTG.  Independent in home treatment program.  Return to previous functional level by discharge.  Reach maximal therapeutic benefit.    Please refer to the daily flowsheet for treatment today, total treatment time and time spent performing 1:1 timed codes.

## 2019-08-09 NOTE — PROGRESS NOTES
[place] [facility] Initial Evaluation  Subjective:       Pertinent medical history includes:  Cancer, high blood pressure, overweight and menopausal.    Surgeries include:  Cancer surgery.  Current medications:  High blood pressure medication.              Occupation: retired.         Oswestry Score: 6.67 %                 Objective:  System    Physical Exam    General     ROS    Assessment/Plan:

## 2019-08-22 ENCOUNTER — THERAPY VISIT (OUTPATIENT)
Dept: PHYSICAL THERAPY | Facility: CLINIC | Age: 74
End: 2019-08-22
Payer: COMMERCIAL

## 2019-08-22 DIAGNOSIS — M54.6 ACUTE BILATERAL THORACIC BACK PAIN: ICD-10-CM

## 2019-08-22 PROCEDURE — 97010 HOT OR COLD PACKS THERAPY: CPT | Mod: GP | Performed by: PHYSICAL THERAPIST

## 2019-08-22 PROCEDURE — 97112 NEUROMUSCULAR REEDUCATION: CPT | Mod: GP | Performed by: PHYSICAL THERAPIST

## 2019-08-22 PROCEDURE — 97140 MANUAL THERAPY 1/> REGIONS: CPT | Mod: GP | Performed by: PHYSICAL THERAPIST

## 2019-08-29 ENCOUNTER — THERAPY VISIT (OUTPATIENT)
Dept: PHYSICAL THERAPY | Facility: CLINIC | Age: 74
End: 2019-08-29
Payer: COMMERCIAL

## 2019-08-29 DIAGNOSIS — M54.6 ACUTE BILATERAL THORACIC BACK PAIN: ICD-10-CM

## 2019-08-29 PROCEDURE — 97112 NEUROMUSCULAR REEDUCATION: CPT | Mod: GP | Performed by: PHYSICAL THERAPIST

## 2019-08-29 PROCEDURE — 97140 MANUAL THERAPY 1/> REGIONS: CPT | Mod: GP | Performed by: PHYSICAL THERAPIST

## 2019-09-05 ENCOUNTER — THERAPY VISIT (OUTPATIENT)
Dept: PHYSICAL THERAPY | Facility: CLINIC | Age: 74
End: 2019-09-05
Payer: COMMERCIAL

## 2019-09-05 DIAGNOSIS — M54.6 ACUTE BILATERAL THORACIC BACK PAIN: ICD-10-CM

## 2019-09-05 PROCEDURE — 97140 MANUAL THERAPY 1/> REGIONS: CPT | Mod: GP | Performed by: PHYSICAL THERAPIST

## 2019-09-05 PROCEDURE — 97112 NEUROMUSCULAR REEDUCATION: CPT | Mod: GP | Performed by: PHYSICAL THERAPIST

## 2019-09-05 NOTE — PROGRESS NOTES
Subjective:  HPI                    Objective:  System    Physical Exam    General     ROS    Assessment/Plan:    PROGRESS  REPORT    Progress reporting period is from 8-2-19 to 9-5-19.       SUBJECTIVE  Subjective changes noted by patient:  Monica is attending classes at the gym and is now able to ly on the floor with minimal pain     Current pain level is: 1/10.     Initial Pain level: 4/10.   Changes in function:  Yes (See Goal flowsheet attached for changes in current functional level)  Adverse reaction to treatment or activity: None    OBJECTIVE  Changes noted in objective finding: Decreased thoracic mobility with kyphosis. Active shoulder Flex = 145 (+5 degrees).      ASSESSMENT/PLAN  Updated problem list and treatment plan: Diagnosis 1: B Thoracic Pain   Pain -  manual therapy, self management, education and home program  Decreased ROM/flexibility - manual therapy, therapeutic exercise and home program  Decreased joint mobility - manual therapy, therapeutic exercise and home program  Decreased strength - therapeutic exercise, therapeutic activities and home program  Impaired muscle performance - neuro re-education and home program  Decreased function - therapeutic activities and home program  Impaired posture - neuro re-education and home program  STG/LTGs have been met or progress has been made towards goals:  Yes (See Goal flow sheet completed today.)  Assessment of Progress: The patient's condition is improving.  Patient is meeting short term goals and is progressing towards long term goals.  Self Management Plans:  Patient has been instructed in a home treatment program.  Patient  has been instructed in self management of symptoms.  I have re-evaluated this patient and find that the nature, scope, duration and intensity of the therapy is appropriate for the medical condition of the patient.  Monica continues to require the following intervention to meet STG and LTG's:  PT    Recommendations:  This patient  would benefit from continued therapy.     Frequency:  1 X week, once daily  Duration:  for 4 weeks        Please refer to the daily flowsheet for treatment today, total treatment time and time spent performing 1:1 timed codes.

## 2019-09-11 ENCOUNTER — MYC MEDICAL ADVICE (OUTPATIENT)
Dept: FAMILY MEDICINE | Facility: CLINIC | Age: 74
End: 2019-09-11

## 2019-10-05 ENCOUNTER — HEALTH MAINTENANCE LETTER (OUTPATIENT)
Age: 74
End: 2019-10-05

## 2019-10-16 PROBLEM — M54.6 ACUTE BILATERAL THORACIC BACK PAIN: Status: RESOLVED | Noted: 2019-08-08 | Resolved: 2019-10-16

## 2019-10-31 ENCOUNTER — ALLIED HEALTH/NURSE VISIT (OUTPATIENT)
Dept: NURSING | Facility: CLINIC | Age: 74
End: 2019-10-31
Payer: COMMERCIAL

## 2019-10-31 DIAGNOSIS — Z23 NEED FOR PROPHYLACTIC VACCINATION AND INOCULATION AGAINST INFLUENZA: Primary | ICD-10-CM

## 2019-10-31 PROCEDURE — G0008 ADMIN INFLUENZA VIRUS VAC: HCPCS

## 2019-10-31 PROCEDURE — 90662 IIV NO PRSV INCREASED AG IM: CPT

## 2019-10-31 PROCEDURE — 99207 ZZC NO CHARGE NURSE ONLY: CPT

## 2020-02-22 ENCOUNTER — MYC MEDICAL ADVICE (OUTPATIENT)
Dept: FAMILY MEDICINE | Facility: CLINIC | Age: 75
End: 2020-02-22

## 2020-03-03 DIAGNOSIS — B37.2 CANDIDAL INTERTRIGO: ICD-10-CM

## 2020-03-03 RX ORDER — NYSTATIN 100000 [USP'U]/G
POWDER TOPICAL
Qty: 60 G | Refills: 4 | Status: SHIPPED | OUTPATIENT
Start: 2020-03-03 | End: 2020-09-03

## 2020-03-03 NOTE — TELEPHONE ENCOUNTER
Routing refill request to provider for review/approval because:  Patient is wanting multiple refills. Provider to please address because of the associated diagnosis.    Cely Osei RN, [facility] Triage

## 2020-03-03 NOTE — TELEPHONE ENCOUNTER
Called and spoke with the patient.  Patient is wanting to have multiple refills on the nystatin. She is unsure why she has only been getting one. Will route to the refill pool to see if RN can process.     Patient had questions on losartan but informed that was sent on 8/1/2019 for 90 plus three. She will contact the pharmacy about that medication.    Cely Osei RN, [facility] Triage

## 2020-06-16 ENCOUNTER — TRANSFERRED RECORDS (OUTPATIENT)
Dept: HEALTH INFORMATION MANAGEMENT | Facility: CLINIC | Age: 75
End: 2020-06-16

## 2020-07-24 ENCOUNTER — TRANSFERRED RECORDS (OUTPATIENT)
Dept: HEALTH INFORMATION MANAGEMENT | Facility: CLINIC | Age: 75
End: 2020-07-24

## 2020-08-26 ENCOUNTER — MYC MEDICAL ADVICE (OUTPATIENT)
Dept: FAMILY MEDICINE | Facility: CLINIC | Age: 75
End: 2020-08-26

## 2020-08-26 DIAGNOSIS — D50.9 IRON DEFICIENCY ANEMIA, UNSPECIFIED IRON DEFICIENCY ANEMIA TYPE: ICD-10-CM

## 2020-08-26 DIAGNOSIS — R79.89 ELEVATED TSH: ICD-10-CM

## 2020-08-26 DIAGNOSIS — E78.5 HYPERLIPIDEMIA LDL GOAL <100: ICD-10-CM

## 2020-08-26 DIAGNOSIS — I10 HYPERTENSION GOAL BP (BLOOD PRESSURE) < 140/90: Primary | ICD-10-CM

## 2020-08-27 ENCOUNTER — MYC MEDICAL ADVICE (OUTPATIENT)
Dept: FAMILY MEDICINE | Facility: CLINIC | Age: 75
End: 2020-08-27

## 2020-08-28 DIAGNOSIS — E78.5 HYPERLIPIDEMIA LDL GOAL <100: ICD-10-CM

## 2020-08-28 DIAGNOSIS — I10 HYPERTENSION GOAL BP (BLOOD PRESSURE) < 140/90: ICD-10-CM

## 2020-08-28 DIAGNOSIS — D50.9 IRON DEFICIENCY ANEMIA, UNSPECIFIED IRON DEFICIENCY ANEMIA TYPE: ICD-10-CM

## 2020-08-28 DIAGNOSIS — R79.89 ELEVATED TSH: ICD-10-CM

## 2020-08-28 LAB
ALBUMIN SERPL-MCNC: 4.2 G/DL (ref 3.4–5)
ALP SERPL-CCNC: 56 U/L (ref 40–150)
ALT SERPL W P-5'-P-CCNC: 40 U/L (ref 0–50)
ANION GAP SERPL CALCULATED.3IONS-SCNC: 4 MMOL/L (ref 3–14)
AST SERPL W P-5'-P-CCNC: 22 U/L (ref 0–45)
BILIRUB SERPL-MCNC: 0.7 MG/DL (ref 0.2–1.3)
BUN SERPL-MCNC: 14 MG/DL (ref 7–30)
CALCIUM SERPL-MCNC: 9.5 MG/DL (ref 8.5–10.1)
CHLORIDE SERPL-SCNC: 110 MMOL/L (ref 94–109)
CHOLEST SERPL-MCNC: 158 MG/DL
CO2 SERPL-SCNC: 25 MMOL/L (ref 20–32)
CREAT SERPL-MCNC: 0.87 MG/DL (ref 0.52–1.04)
CREAT UR-MCNC: 154 MG/DL
ERYTHROCYTE [DISTWIDTH] IN BLOOD BY AUTOMATED COUNT: 12.9 % (ref 10–15)
GFR SERPL CREATININE-BSD FRML MDRD: 65 ML/MIN/{1.73_M2}
GLUCOSE SERPL-MCNC: 107 MG/DL (ref 70–99)
HCT VFR BLD AUTO: 42.4 % (ref 35–47)
HDLC SERPL-MCNC: 59 MG/DL
HGB BLD-MCNC: 14.3 G/DL (ref 11.7–15.7)
LDLC SERPL CALC-MCNC: 70 MG/DL
MCH RBC QN AUTO: 29.6 PG (ref 26.5–33)
MCHC RBC AUTO-ENTMCNC: 33.7 G/DL (ref 31.5–36.5)
MCV RBC AUTO: 88 FL (ref 78–100)
MICROALBUMIN UR-MCNC: 40 MG/L
MICROALBUMIN/CREAT UR: 25.65 MG/G CR (ref 0–25)
NONHDLC SERPL-MCNC: 99 MG/DL
PLATELET # BLD AUTO: 234 10E9/L (ref 150–450)
POTASSIUM SERPL-SCNC: 4 MMOL/L (ref 3.4–5.3)
PROT SERPL-MCNC: 7.6 G/DL (ref 6.8–8.8)
RBC # BLD AUTO: 4.83 10E12/L (ref 3.8–5.2)
SODIUM SERPL-SCNC: 139 MMOL/L (ref 133–144)
T4 FREE SERPL-MCNC: 1.16 NG/DL (ref 0.76–1.46)
TRIGL SERPL-MCNC: 144 MG/DL
TSH SERPL DL<=0.005 MIU/L-ACNC: 4.88 MU/L (ref 0.4–4)
WBC # BLD AUTO: 5.5 10E9/L (ref 4–11)

## 2020-08-28 PROCEDURE — 84443 ASSAY THYROID STIM HORMONE: CPT | Performed by: FAMILY MEDICINE

## 2020-08-28 PROCEDURE — 85027 COMPLETE CBC AUTOMATED: CPT | Performed by: FAMILY MEDICINE

## 2020-08-28 PROCEDURE — 80053 COMPREHEN METABOLIC PANEL: CPT | Performed by: FAMILY MEDICINE

## 2020-08-28 PROCEDURE — 80061 LIPID PANEL: CPT | Performed by: FAMILY MEDICINE

## 2020-08-28 PROCEDURE — 36415 COLL VENOUS BLD VENIPUNCTURE: CPT | Performed by: FAMILY MEDICINE

## 2020-08-28 PROCEDURE — 82043 UR ALBUMIN QUANTITATIVE: CPT | Performed by: FAMILY MEDICINE

## 2020-08-28 PROCEDURE — 84439 ASSAY OF FREE THYROXINE: CPT | Performed by: FAMILY MEDICINE

## 2020-08-31 NOTE — RESULT ENCOUNTER NOTE
"Your urine testing does show a borderline elevated protein level.  We can discuss at ointment later this week.  Thyroid testing shows normal active thyroid hormone (T4 for free) while the thyroid-stimulating hormone is slightly elevated which could indicate a tendency towards underactive thyroid.  We will discuss symptoms and plan for this at your appointment.  Your cholesterol testing is under good control  Your blood sugar is borderline elevated.  This is in the \"prediabetic\" range.  Exercise and limiting carbohydrate and sugars in diet can be helpful at preventing progression to diabetes.  Liver and kidney testing are normal.  Your blood cell counts are normal as well.  Please call or MyChart message me if you have any questions.  See you later this week,  PSK"

## 2020-09-02 ASSESSMENT — ENCOUNTER SYMPTOMS
COUGH: 0
HEARTBURN: 0
NERVOUS/ANXIOUS: 1
BREAST MASS: 0
WEAKNESS: 0
JOINT SWELLING: 0
SORE THROAT: 0
FREQUENCY: 1
ABDOMINAL PAIN: 0
HEMATURIA: 0
HEADACHES: 0
SHORTNESS OF BREATH: 0
DYSURIA: 0
FEVER: 0
PARESTHESIAS: 0
PALPITATIONS: 0
NAUSEA: 0
MYALGIAS: 0
DIARRHEA: 0
DIZZINESS: 0
CONSTIPATION: 0
ARTHRALGIAS: 0
HEMATOCHEZIA: 0
CHILLS: 0
EYE PAIN: 0

## 2020-09-02 ASSESSMENT — ACTIVITIES OF DAILY LIVING (ADL): CURRENT_FUNCTION: NO ASSISTANCE NEEDED

## 2020-09-03 ENCOUNTER — OFFICE VISIT (OUTPATIENT)
Dept: FAMILY MEDICINE | Facility: CLINIC | Age: 75
End: 2020-09-03
Payer: COMMERCIAL

## 2020-09-03 VITALS
WEIGHT: 174 LBS | BODY MASS INDEX: 39.14 KG/M2 | HEIGHT: 56 IN | OXYGEN SATURATION: 95 % | SYSTOLIC BLOOD PRESSURE: 138 MMHG | DIASTOLIC BLOOD PRESSURE: 78 MMHG | HEART RATE: 73 BPM | TEMPERATURE: 98.4 F

## 2020-09-03 DIAGNOSIS — C50.919 INFILTRATING DUCTAL CARCINOMA OF BREAST, STAGE 1, UNSPECIFIED LATERALITY (H): ICD-10-CM

## 2020-09-03 DIAGNOSIS — R79.89 ELEVATED TSH: ICD-10-CM

## 2020-09-03 DIAGNOSIS — I10 HYPERTENSION GOAL BP (BLOOD PRESSURE) < 140/90: ICD-10-CM

## 2020-09-03 DIAGNOSIS — E66.01 MORBID OBESITY DUE TO EXCESS CALORIES (H): ICD-10-CM

## 2020-09-03 DIAGNOSIS — Z00.00 ENCOUNTER FOR MEDICARE ANNUAL WELLNESS EXAM: Primary | ICD-10-CM

## 2020-09-03 DIAGNOSIS — E78.5 HYPERLIPIDEMIA LDL GOAL <100: ICD-10-CM

## 2020-09-03 DIAGNOSIS — B37.2 CANDIDAL INTERTRIGO: ICD-10-CM

## 2020-09-03 PROCEDURE — G0439 PPPS, SUBSEQ VISIT: HCPCS | Performed by: FAMILY MEDICINE

## 2020-09-03 PROCEDURE — 99213 OFFICE O/P EST LOW 20 MIN: CPT | Mod: 25 | Performed by: FAMILY MEDICINE

## 2020-09-03 RX ORDER — LOSARTAN POTASSIUM 50 MG/1
50 TABLET ORAL DAILY
Qty: 90 TABLET | Refills: 3 | Status: SHIPPED | OUTPATIENT
Start: 2020-09-03 | End: 2023-05-08 | Stop reason: DRUGHIGH

## 2020-09-03 RX ORDER — SIMVASTATIN 40 MG
40 TABLET ORAL AT BEDTIME
Qty: 90 TABLET | Refills: 3 | Status: SHIPPED | OUTPATIENT
Start: 2020-09-03 | End: 2023-05-09

## 2020-09-03 RX ORDER — NYSTATIN 100000 [USP'U]/G
POWDER TOPICAL
Qty: 60 G | Refills: 4 | Status: SHIPPED | OUTPATIENT
Start: 2020-09-03 | End: 2023-10-12

## 2020-09-03 ASSESSMENT — PAIN SCALES - GENERAL: PAINLEVEL: NO PAIN (0)

## 2020-09-03 ASSESSMENT — MIFFLIN-ST. JEOR: SCORE: 1147.26

## 2020-09-03 ASSESSMENT — ACTIVITIES OF DAILY LIVING (ADL): CURRENT_FUNCTION: NO ASSISTANCE NEEDED

## 2020-09-03 NOTE — PATIENT INSTRUCTIONS
Refill medication today.      Patient Education   Personalized Prevention Plan  You are due for the preventive services outlined below.  Your care team is available to assist you in scheduling these services.  If you have already completed any of these items, please share that information with your care team to update in your medical record.  Health Maintenance Due   Topic Date Due     Discuss Advance Care Planning  07/12/2016     Annual Wellness Visit  08/01/2020     FALL RISK ASSESSMENT  08/01/2020     Flu Vaccine (1) 09/01/2020

## 2020-09-03 NOTE — PROGRESS NOTES
"  Answers for HPI/ROS submitted by the patient on 9/2/2020   Annual Exam:  In general, how would you rate your overall physical health?: good  Frequency of exercise:: 2-3 days/week  Do you usually eat at least 4 servings of fruit and vegetables a day, include whole grains & fiber, and avoid regularly eating high fat or \"junk\" foods? : No  Taking medications regularly:: Yes  Medication side effects:: None  Activities of Daily Living: no assistance needed  Home safety: no safety concerns identified  Hearing Impairment:: feel that people are mumbling or not speaking clearly, need to ask people to speak up or repeat themselves, difficulty understanding soft or whispered speech  In the past 6 months, have you been bothered by leaking of urine?: No  abdominal pain: No  Blood in stool: No  Blood in urine: No  chest pain: No  chills: No  congestion: No  constipation: No  cough: No  diarrhea: No  dizziness: No  ear pain: No  eye pain: No  nervous/anxious: Yes  fever: No  frequency: Yes  genital sores: No  headaches: No  hearing loss: Yes  heartburn: No  arthralgias: No  joint swelling: No  peripheral edema: No  mood changes: Yes  myalgias: No  nausea: No  dysuria: No  palpitations: No  Skin sensation changes: No  sore throat: No  urgency: Yes  rash: Yes  shortness of breath: No  visual disturbance: No  weakness: No  pelvic pain: No  vaginal bleeding: No  vaginal discharge: No  tenderness: No  breast mass: No  breast discharge: No  In general, how would you rate your overall mental or emotional health?: fair  Additional concerns today:: No  Duration of exercise:: 15-30 minutes    "

## 2020-09-03 NOTE — PROGRESS NOTES
"SUBJECTIVE:   Monica Samayoa is a 75 year old female who presents for Preventive Visit.    Are you in the first 12 months of your Medicare coverage?  No    Healthy Habits:     In general, how would you rate your overall health?  Good    Frequency of exercise:  2-3 days/week    Duration of exercise:  15-30 minutes    Do you usually eat at least 4 servings of fruit and vegetables a day, include whole grains    & fiber and avoid regularly eating high fat or \"junk\" foods?  No    Taking medications regularly:  Yes    Medication side effects:  None    Ability to successfully perform activities of daily living:  No assistance needed    Home Safety:  No safety concerns identified    Hearing Impairment:  Feel that people are mumbling or not speaking clearly, need to ask people to speak up or repeat themselves and difficulty understanding soft or whispered speech    In the past 6 months, have you been bothered by leaking of urine?  No    In general, how would you rate your overall mental or emotional health?  Fair      PHQ-2 Total Score: 2    Additional concerns today:  No    Do you feel safe in your environment? YES    Have you ever done Advance Care Planning? (For example, a Health Directive, POLST, or a discussion with a medical provider or your loved ones about your wishes): Yes, patient states has an Advance Care Planning document and will bring a copy to the clinic.      Fall risk  Fallen 2 or more times in the past year?: No  Any fall with injury in the past year?: Yes    Cognitive Screening   1) Repeat 3 items (Leader, Season, Table)  YES  2) Clock draw: NORMAL  3) 3 item recall: Recalls 3 objects  Results: NORMAL clock, 3 items recalled: COGNITIVE IMPAIRMENT LESS LIKELY    Mini-CogTM Copyright RADHA Flores. Licensed by the author for use in [facility]; reprinted with permission (celine@.[facility]). All rights reserved.      Do you have sleep apnea, excessive snoring or daytime drowsiness?: no    Reviewed and " updated as needed this visit by clinical staff  Tobacco  Allergies  Meds  Problems  Med Hx  Surg Hx  Fam Hx  Soc Hx          Reviewed and updated as needed this visit by Provider  Tobacco  Allergies  Meds  Med Hx  Surg Hx  Fam Hx  Soc Hx       Social History     Tobacco Use     Smoking status: Never Smoker     Smokeless tobacco: Never Used   Substance Use Topics     Alcohol use: Yes     Comment: 1-2  glasses of wine a week     If you drink alcohol do you typically have >3 drinks per day or >7 drinks per week? No    Alcohol Use 9/2/2020   Prescreen: >3 drinks/day or >7 drinks/week? No   Prescreen: >3 drinks/day or >7 drinks/week? -               Current providers sharing in care for this patient include:   Patient Care Team:  Heather Adams MD as PCP - General (Family Practice)  Heather Adams MD as Assigned PCP    The following health maintenance items are reviewed in Epic and correct as of today:  Health Maintenance   Topic Date Due     PHQ-2  01/01/2020     FALL RISK ASSESSMENT  08/01/2020     INFLUENZA VACCINE (1) 09/01/2020     MEDICARE ANNUAL WELLNESS VISIT  09/03/2021     MAMMO SCREENING  07/24/2022     COLORECTAL CANCER SCREENING  05/24/2023     LIPID  08/28/2025     ADVANCE CARE PLANNING  09/03/2025     DTAP/TDAP/TD IMMUNIZATION (3 - Td) 08/14/2027     DEXA  Completed     HEPATITIS C SCREENING  Completed     PNEUMOCOCCAL IMMUNIZATION 65+ LOW/MEDIUM RISK  Completed     ZOSTER IMMUNIZATION  Completed     IPV IMMUNIZATION  Aged Out     MENINGITIS IMMUNIZATION  Aged Out     HEPATITIS B IMMUNIZATION  Aged Out     BP Readings from Last 3 Encounters:   09/03/20 (!) 151/81   08/01/19 132/72   08/23/18 134/78    Wt Readings from Last 3 Encounters:   09/03/20 78.9 kg (174 lb)   08/01/19 79.3 kg (174 lb 12.8 oz)   08/23/18 78 kg (172 lb)             Hyperlipidemia Follow-Up      Are you regularly taking any medication or supplement to lower your cholesterol?   Yes- Simvastatin    Are you having  "muscle aches or other side effects that you think could be caused by your cholesterol lowering medication?  No    Hypertension Follow-up      Do you check your blood pressure regularly outside of the clinic? No     Are you following a low salt diet? Yes    Are your blood pressures ever more than 140 on the top number (systolic) OR more   than 90 on the bottom number (diastolic), for example 140/90? No    Yeast infection on the skin -areas beneath the breast and abdominal pannus get irritated with a red rash that is itchy.  She uses Mycostatin topical powder as needed with good control of symptoms.    History of interductal carcinoma left breast  -he is having her final follow-up with the oncologist later this year as it will be 10 years since her diagnosis.  Continues regular mammograms       Pneumonia Vaccine: Series completed  Mammogram Screening: Mammogram Screening: Patient over age 50, mutual decision to screen reflected in health maintenance.    Review of Systems  10 point ROS of systems including Constitutional, Eyes, Respiratory, Cardiovascular, Gastroenterology, Genitourinary, Integumentary, Muscularskeletal, Psychiatric were all negative except for pertinent positives noted in my HPI.      OBJECTIVE:   /78   Pulse 73   Temp 98.4  F (36.9  C) (Oral)   Ht 1.422 m (4' 8\")   Wt 78.9 kg (174 lb)   SpO2 95%   BMI 39.01 kg/m   Estimated body mass index is 39.01 kg/m  as calculated from the following:    Height as of this encounter: 1.422 m (4' 8\").    Weight as of this encounter: 78.9 kg (174 lb).  Physical Exam  GENERAL APPEARANCE: healthy, alert and no distress  EYES: Eyes grossly normal to inspection, PERRL and conjunctivae and sclerae normal  HENT: ear canals and TM's normal, nose and mouth without ulcers or lesions, oropharynx clear and oral mucous membranes moist  NECK: no adenopathy, no asymmetry, masses, or scars and thyroid normal to palpation  RESP: lungs clear to auscultation - no rales, " rhonchi or wheezes  BREAST: normal without masses, tenderness or nipple discharge and no palpable axillary masses or adenopathy  CV: regular rate and rhythm, normal S1 S2, no S3 or S4, no murmur, click or rub, no peripheral edema and peripheral pulses strong  ABDOMEN: soft, nontender, no hepatosplenomegaly, no masses and bowel sounds normal  MS: no musculoskeletal defects are noted and gait is age appropriate without ataxia  SKIN: no suspicious lesions.   Erythematous patches beneath the breast bilaterally mild scaling no open areas consistent with yeast infection on skin.  And hair quality - thin  NEURO: Normal strength and tone, sensory exam grossly normal, mentation intact and speech normal  PSYCH: mentation appears normal and affect normal/bright    Diagnostic Test Results:  Labs reviewed in Epic  Component      Latest Ref Rng & Units 8/28/2020   Sodium      133 - 144 mmol/L 139   Potassium      3.4 - 5.3 mmol/L 4.0   Chloride      94 - 109 mmol/L 110 (H)   Carbon Dioxide      20 - 32 mmol/L 25   Anion Gap      3 - 14 mmol/L 4   Glucose      70 - 99 mg/dL 107 (H)   Urea Nitrogen      7 - 30 mg/dL 14   Creatinine      0.52 - 1.04 mg/dL 0.87   GFR Estimate      >60 mL/min/1.73:m2 65   GFR Estimate If Black      >60 mL/min/1.73:m2 76   Calcium      8.5 - 10.1 mg/dL 9.5   Bilirubin Total      0.2 - 1.3 mg/dL 0.7   Albumin      3.4 - 5.0 g/dL 4.2   Protein Total      6.8 - 8.8 g/dL 7.6   Alkaline Phosphatase      40 - 150 U/L 56   ALT      0 - 50 U/L 40   AST      0 - 45 U/L 22   WBC      4.0 - 11.0 10e9/L 5.5   RBC Count      3.8 - 5.2 10e12/L 4.83   Hemoglobin      11.7 - 15.7 g/dL 14.3   Hematocrit      35.0 - 47.0 % 42.4   MCV      78 - 100 fl 88   MCH      26.5 - 33.0 pg 29.6   MCHC      31.5 - 36.5 g/dL 33.7   RDW      10.0 - 15.0 % 12.9   Platelet Count      150 - 450 10e9/L 234   Cholesterol      <200 mg/dL 158   Triglycerides      <150 mg/dL 144   HDL Cholesterol      >49 mg/dL 59   LDL Cholesterol  Calculated      <100 mg/dL 70   Non HDL Cholesterol      <130 mg/dL 99   Creatinine Urine      mg/dL 154   Albumin Urine mg/L      mg/L 40   Albumin Urine mg/g Cr      0 - 25 mg/g Cr 25.65 (H)   TSH      0.40 - 4.00 mU/L 4.88 (H)   T4 Free      0.76 - 1.46 ng/dL 1.16       ASSESSMENT / PLAN:   1. Encounter for Medicare annual wellness exam  Screening and preventative care discussed    2. Hyperlipidemia LDL goal <100  Good control of cholesterol noted.  Refill simvastatin today  - simvastatin (ZOCOR) 40 MG tablet; Take 1 tablet (40 mg) by mouth At Bedtime  Dispense: 90 tablet; Refill: 3    3. Hypertension goal BP (blood pressure) < 140/90  Blood pressure controlled continue current treatment  - losartan (COZAAR) 50 MG tablet; Take 1 tablet (50 mg) by mouth daily  Dispense: 90 tablet; Refill: 3    4. Candidal intertrigo  Topical treatment as noted  - nystatin (MYCOSTATIN) 923905 UNIT/GM external powder; apply powder liberally to groin and under breasts three times daily with rash and once daily for prevention  Dispense: 60 g; Refill: 4    5. Morbid obesity due to excess calories (H)  Wt Readings from Last 5 Encounters:   09/03/20 78.9 kg (174 lb)   08/01/19 79.3 kg (174 lb 12.8 oz)   08/23/18 78 kg (172 lb)   06/28/18 79.8 kg (176 lb)   09/22/17 80.6 kg (177 lb 9.6 oz)   Diet and exercise discussed      6. Infiltrating ductal carcinoma of breast, stage 1, unspecified laterality (H)  Follow-up with oncology for final visit and then we will continue screening through our office yearly.    7. Elevated TSH  Active thyroid hormone.  Discussed potential for treatment but she is not having any particular thyroid symptoms.  Monitor TSH yearly.      COUNSELING:  Reviewed preventive health counseling, as reflected in patient instructions       Regular exercise       Healthy diet/nutrition       Vision screening       Dental care       Bladder control       Fall risk prevention       Immunizations    Declined: Influenza due to  "Other will return later in the season for flu vaccine               Osteoporosis Prevention/Bone Health       Colon cancer screening    Estimated body mass index is 39.01 kg/m  as calculated from the following:    Height as of this encounter: 1.422 m (4' 8\").    Weight as of this encounter: 78.9 kg (174 lb).    Weight management plan: Discussed healthy diet and exercise guidelines    She reports that she has never smoked. She has never used smokeless tobacco.      Appropriate preventive services were discussed with this patient, including applicable screening as appropriate for cardiovascular disease, diabetes, osteopenia/osteoporosis, and glaucoma.  As appropriate for age/gender, discussed screening for colorectal cancer, prostate cancer, breast cancer, and cervical cancer. Checklist reviewing preventive services available has been given to the patient.    Reviewed patients plan of care and provided an AVS. The Basic Care Plan (routine screening as documented in Health Maintenance) for Monica meets the Care Plan requirement. This Care Plan has been established and reviewed with the Patient.    Counseling Resources:  ATP IV Guidelines  Pooled Cohorts Equation Calculator  Breast Cancer Risk Calculator  Breast Cancer: Medication to Reduce Risk  FRAX Risk Assessment  ICSI Preventive Guidelines  Dietary Guidelines for Americans, 2010  [employer]'s MyPlate  ASA Prophylaxis  Lung CA Screening    Heather Adams MD  [facility]    Identified Health Risks:            "

## 2020-09-04 ASSESSMENT — PATIENT HEALTH QUESTIONNAIRE - PHQ9: SUM OF ALL RESPONSES TO PHQ QUESTIONS 1-9: 2

## 2020-11-14 ENCOUNTER — HEALTH MAINTENANCE LETTER (OUTPATIENT)
Age: 75
End: 2020-11-14

## 2020-11-16 ENCOUNTER — MYC MEDICAL ADVICE (OUTPATIENT)
Dept: FAMILY MEDICINE | Facility: CLINIC | Age: 75
End: 2020-11-16

## 2021-01-22 ENCOUNTER — MYC MEDICAL ADVICE (OUTPATIENT)
Dept: FAMILY MEDICINE | Facility: CLINIC | Age: 76
End: 2021-01-22

## 2021-02-03 ENCOUNTER — MYC MEDICAL ADVICE (OUTPATIENT)
Dept: FAMILY MEDICINE | Facility: CLINIC | Age: 76
End: 2021-02-03

## 2021-04-01 ENCOUNTER — TRANSFERRED RECORDS (OUTPATIENT)
Dept: HEALTH INFORMATION MANAGEMENT | Facility: CLINIC | Age: 76
End: 2021-04-01

## 2021-08-25 ENCOUNTER — THERAPY VISIT (OUTPATIENT)
Dept: PHYSICAL THERAPY | Facility: CLINIC | Age: 76
End: 2021-08-25
Payer: COMMERCIAL

## 2021-08-25 DIAGNOSIS — M54.41 CHRONIC RIGHT-SIDED LOW BACK PAIN WITH RIGHT-SIDED SCIATICA: ICD-10-CM

## 2021-08-25 DIAGNOSIS — M79.605 BILATERAL LEG PAIN: ICD-10-CM

## 2021-08-25 DIAGNOSIS — M79.604 BILATERAL LEG PAIN: ICD-10-CM

## 2021-08-25 DIAGNOSIS — G89.29 CHRONIC RIGHT-SIDED LOW BACK PAIN WITH RIGHT-SIDED SCIATICA: ICD-10-CM

## 2021-08-25 PROCEDURE — 97161 PT EVAL LOW COMPLEX 20 MIN: CPT | Mod: GP | Performed by: PHYSICAL THERAPIST

## 2021-08-25 PROCEDURE — 97110 THERAPEUTIC EXERCISES: CPT | Mod: GP | Performed by: PHYSICAL THERAPIST

## 2021-08-25 NOTE — PROGRESS NOTES
Physical Therapy Initial Evaluation  Subjective:  The history is provided by the patient.   Therapist Generated HPI Evaluation  Problem details: 20 years in thighs.  Feel tight/ weak.  Notes 2 months sciatica on R.  Occasional electrical feeling in sides of legs or quads.  .         Type of problem:  Lumbar (R / both ITB and quad).    This is a chronic condition.  Condition occurred with:  Insidious onset.  Where condition occurred: for unknown reasons.  Patient reports pain:  Lumbar spine right and lower lumbar spine.  Pain is described as aching and sharp and is intermittent.  Pain radiates to:  No radiation. Pain is worse during the day.  Since onset symptoms are unchanged.  Associated symptoms:  Loss of motion/stiffness (pain). Symptoms are exacerbated by standing (stand 10 - 15 min in quads;  up steps (1 at a time))  and relieved by rest.          Patient Health History  Monica Samayoa being seen for tight muscles;  sciatica.     Date of Onset: pain for about 2 months;  longer with sciatica.   Problem occurred: unsure   Pain is reported as 7/10 on pain scale.  General health as reported by patient is good.  Pertinent medical history includes: cancer, high blood pressure, menopausal and overweight.   Red flags:  None as reported by patient.     Surgeries include:  Cancer surgery (breast 2010).    Current medications:  High blood pressure medication. Other medications details: statin.    Current occupation is retired.                                       Objective:  System         Lumbar/SI Evaluation  ROM:    AROM Lumbar:   Flexion:          Normal   Ext:                    Normal with pain   Side Bend:        Left:  Normal    Right:  Normal   Rotation:           Left:     Right:   Side Glide:        Left:     Right:         Strength: fair trunk strength  Lumbar Myotomes:  normal            Lumbar DTR's:  normal          Neural Tension/Mobility:      Left side:SLR or Slump  negative.     Right side:   Slump or  SLR  negative.   Lumbar Palpation:    Tenderness present at Left:    Piriformis  Tenderness not present at Left:    Quadratus Lumborum or Erector Spinae    Tenderness not present at Right:  Quadratus Lumborum or Erector Spinae        SI joint/Sacrum:    negative                                            Hip Evaluation  HIP AROM:  AROM:    Left Hip:     Normal    Right Hip:   Normal                    Hip Strength:      Extension:  Left: 4+/5  Pain:Right: 5-/5    Pain:    Abduction:  Left: 5/5     Pain:Right: 5/5    Pain:  Adduction:  Left: 5/5    Pain:Right: 5/5   Pain:                Hip Special Testing:      Left hip negative for the following special tests:  Linda; SLR; Edy's or Garrick  Right hip negative for the following special tests:  Linda; SLR; Edy's or Garrick    Hip Palpation:    Left hip tenderness present at:   IT Band and hip flexors  Right hip tenderness present at:  IT Band and hip flexors             General     ROS    Assessment/Plan:    Patient is a 76 year old female with lumbar and bilat leg complaints.    Patient has the following significant findings with corresponding treatment plan.                Diagnosis 1:  R sciatica/ bilat quad and ITB pain  Pain -  self management, education, directional preference exercise and home program  Decreased strength - therapeutic exercise and therapeutic activities  Impaired muscle performance - neuro re-education  Decreased function - therapeutic activities    Therapy Evaluation Codes:   1) History comprised of:   Personal factors that impact the plan of care:      None.    Comorbidity factors that impact the plan of care are:      Cancer, High blood pressure, Menopausal and Overweight.     Medications impacting care: High blood pressure.  2) Examination of Body Systems comprised of:   Body structures and functions that impact the plan of care:      Lumbar spine and legs.   Activity limitations that impact the plan of care are:      Stairs and  Standing.  3) Clinical presentation characteristics are:   Stable/Uncomplicated.  4) Decision-Making    Low complexity using standardized patient assessment instrument and/or measureable assessment of functional outcome.  Cumulative Therapy Evaluation is: Low complexity.    Previous and current functional limitations:  (See Goal Flow Sheet for this information)    Short term and Long term goals: (See Goal Flow Sheet for this information)     Communication ability:  Patient appears to be able to clearly communicate and understand verbal and written communication and follow directions correctly.  Treatment Explanation - The following has been discussed with the patient:   RX ordered/plan of care  Anticipated outcomes  Possible risks and side effects  This patient would benefit from PT intervention to resume normal activities.   Rehab potential is good.    Frequency:  1 X week, once daily  Duration:  for 6 weeks  Discharge Plan:  Achieve all LTG.  Independent in home treatment program.  Reach maximal therapeutic benefit.    Please refer to the daily flowsheet for treatment today, total treatment time and time spent performing 1:1 timed codes.

## 2021-08-25 NOTE — LETTER
[facility]  [address] [address] [place] [address]  [phone]    2021    Re: Monica Samayoa   :   1945  MRN:  [id number]   REFERRING PHYSICIAN:   Olya SIMPSON [facility]    Date of Initial Evaluation:  2021  Visits:  Rxs Used: 1  Reason for Referral:     Chronic right-sided low back pain with right-sided sciatica  Bilateral leg pain    EVALUATION SUMMARY  Physical Therapy Initial Evaluation  Subjective:  The history is provided by the patient.   Therapist Generated HPI Evaluation  Problem details: 20 years in thighs.  Feel tight/ weak.  Notes 2 months sciatica on R.  Occasional electrical feeling in sides of legs or quads.  .         Type of problem:  Lumbar (R / both ITB and quad).  This is a chronic condition.  Condition occurred with:  Insidious onset.  Where condition occurred: for unknown reasons.  Patient reports pain:  Lumbar spine right and lower lumbar spine.  Pain is described as aching and sharp and is intermittent.  Pain radiates to:  No radiation. Pain is worse during the day.  Since onset symptoms are unchanged.  Associated symptoms:  Loss of motion/stiffness (pain). Symptoms are exacerbated by standing (stand 10 - 15 min in quads;  up steps (1 at a time))  and relieved by rest.    Patient Health History  Monica Samayoa being seen for tight muscles;  sciatica.   Date of Onset: pain for about 2 months;  longer with sciatica.   Problem occurred: unsure   Pain is reported as 7/10 on pain scale.  General health as reported by patient is good.  Pertinent medical history includes: cancer, high blood pressure, menopausal and overweight.   Red flags:  None as reported by patient.   Surgeries include:  Cancer surgery (breast ).    Current medications:  High blood pressure medication. Other medications details: statin.  Current occupation is retired.      Objective:       Lumbar/SI  Evaluation  ROM:    Re: Monica Samayoa   :   1945- page 2    AROM Lumbar:   Flexion:          Normal   Ext:                    Normal with pain   Side Bend:        Left:  Normal    Right:  Normal   Rotation:           Left:     Right:   Side Glide:        Left:     Right:   Strength: fair trunk strength  Lumbar Myotomes:  normal  Lumbar DTR's:  normal  Neural Tension/Mobility:    Left side:SLR or Slump  negative.   Right side:   Slump or SLR  negative.   Lumbar Palpation:    Tenderness present at Left:    Piriformis  Tenderness not present at Left:    Quadratus Lumborum or Erector Spinae  Tenderness not present at Right:  Quadratus Lumborum or Erector Spinae  SI joint/Sacrum:    negative  Hip Evaluation  HIP AROM:  AROM:    Left Hip:     Normal    Right Hip:   Normal  Hip Strength:    Extension:  Left: 4+/5  Pain:Right: 5-/5    Pain:    Abduction:  Left: 5/5     Pain:Right: 5/5    Pain:  Adduction:  Left: 5/5    Pain:Right: 5/5   Pain:  Hip Special Testing:    Left hip negative for the following special tests:  Linda; SLR; Edy's or Garrick  Right hip negative for the following special tests:  Linda; SLR; Edy's or Garrick    Hip Palpation:    Left hip tenderness present at:   IT Band and hip flexors  Right hip tenderness present at:  IT Band and hip flexors     Assessment/Plan:    Patient is a 76 year old female with lumbar and bilat leg complaints.    Patient has the following significant findings with corresponding treatment plan.                Diagnosis 1:  R sciatica/ bilat quad and ITB pain  Pain -  self management, education, directional preference exercise and home program  Decreased strength - therapeutic exercise and therapeutic activities  Impaired muscle performance - neuro re-education  Decreased function - therapeutic activities    Therapy Evaluation Codes:   1) History comprised of:   Personal factors that impact the plan of care:      None.    Comorbidity factors that impact the plan of care  are:      Cancer, High blood pressure, Menopausal and Overweight.     Medications impacting care: High blood pressure.  2) Examination of Body Systems comprised of:   Body structures and functions that impact the plan of care:      Lumbar spine and legs.  Re: Monica Samayoa   :   1945- page 3     Activity limitations that impact the plan of care are:      Stairs and Standing.  3) Clinical presentation characteristics are:   Stable/Uncomplicated.  4) Decision-Making    Low complexity using standardized patient assessment instrument and/or measureable assessment of functional outcome.  Cumulative Therapy Evaluation is: Low complexity.    Previous and current functional limitations:  (See Goal Flow Sheet for this information)    Short term and Long term goals: (See Goal Flow Sheet for this information)     Communication ability:  Patient appears to be able to clearly communicate and understand verbal and written communication and follow directions correctly.  Treatment Explanation - The following has been discussed with the patient:   RX ordered/plan of care  Anticipated outcomes  Possible risks and side effects  This patient would benefit from PT intervention to resume normal activities.   Rehab potential is good.    Frequency:  1 X week, once daily  Duration:  for 6 weeks  Discharge Plan:  Achieve all LTG.  Independent in home treatment program.  Reach maximal therapeutic benefit.    Thank you for your referral.    INQUIRIES  Therapist: Garrick Vickers PT  [facility] SERVICES [address] [address]  Phone: [phone]  Fax: [phone]

## 2021-08-31 ENCOUNTER — THERAPY VISIT (OUTPATIENT)
Dept: PHYSICAL THERAPY | Facility: CLINIC | Age: 76
End: 2021-08-31
Payer: COMMERCIAL

## 2021-08-31 DIAGNOSIS — M54.41 CHRONIC RIGHT-SIDED LOW BACK PAIN WITH RIGHT-SIDED SCIATICA: ICD-10-CM

## 2021-08-31 DIAGNOSIS — M79.605 BILATERAL LEG PAIN: ICD-10-CM

## 2021-08-31 DIAGNOSIS — G89.29 CHRONIC RIGHT-SIDED LOW BACK PAIN WITH RIGHT-SIDED SCIATICA: ICD-10-CM

## 2021-08-31 DIAGNOSIS — M79.604 BILATERAL LEG PAIN: ICD-10-CM

## 2021-08-31 PROCEDURE — 97112 NEUROMUSCULAR REEDUCATION: CPT | Mod: GP | Performed by: PHYSICAL THERAPIST

## 2021-08-31 PROCEDURE — 97110 THERAPEUTIC EXERCISES: CPT | Mod: GP | Performed by: PHYSICAL THERAPIST

## 2021-09-09 ENCOUNTER — THERAPY VISIT (OUTPATIENT)
Dept: PHYSICAL THERAPY | Facility: CLINIC | Age: 76
End: 2021-09-09
Payer: COMMERCIAL

## 2021-09-09 DIAGNOSIS — G89.29 CHRONIC RIGHT-SIDED LOW BACK PAIN WITH RIGHT-SIDED SCIATICA: ICD-10-CM

## 2021-09-09 DIAGNOSIS — M54.41 CHRONIC RIGHT-SIDED LOW BACK PAIN WITH RIGHT-SIDED SCIATICA: ICD-10-CM

## 2021-09-09 DIAGNOSIS — M79.605 BILATERAL LEG PAIN: ICD-10-CM

## 2021-09-09 DIAGNOSIS — M79.604 BILATERAL LEG PAIN: ICD-10-CM

## 2021-09-09 PROCEDURE — 97110 THERAPEUTIC EXERCISES: CPT | Mod: GP | Performed by: PHYSICAL THERAPIST

## 2021-09-09 PROCEDURE — 97112 NEUROMUSCULAR REEDUCATION: CPT | Mod: GP | Performed by: PHYSICAL THERAPIST

## 2021-09-12 ENCOUNTER — HEALTH MAINTENANCE LETTER (OUTPATIENT)
Age: 76
End: 2021-09-12

## 2021-09-21 ENCOUNTER — THERAPY VISIT (OUTPATIENT)
Dept: PHYSICAL THERAPY | Facility: CLINIC | Age: 76
End: 2021-09-21
Payer: COMMERCIAL

## 2021-09-21 DIAGNOSIS — M54.41 CHRONIC RIGHT-SIDED LOW BACK PAIN WITH RIGHT-SIDED SCIATICA: ICD-10-CM

## 2021-09-21 DIAGNOSIS — G89.29 CHRONIC RIGHT-SIDED LOW BACK PAIN WITH RIGHT-SIDED SCIATICA: ICD-10-CM

## 2021-09-21 DIAGNOSIS — M79.605 BILATERAL LEG PAIN: ICD-10-CM

## 2021-09-21 DIAGNOSIS — M79.604 BILATERAL LEG PAIN: ICD-10-CM

## 2021-09-21 PROCEDURE — 97112 NEUROMUSCULAR REEDUCATION: CPT | Mod: GP | Performed by: PHYSICAL THERAPIST

## 2021-09-21 PROCEDURE — 97110 THERAPEUTIC EXERCISES: CPT | Mod: GP | Performed by: PHYSICAL THERAPIST

## 2021-09-21 NOTE — PROGRESS NOTES
Subjective:  HPI  Physical Exam                    Objective:  System    Physical Exam    General     ROS    Assessment/Plan:    PROGRESS  REPORT    Progress reporting period is from 8/25/2021 to 9/21/2021. Progress report on 9/21/2021 equal to discharge summary.     SUBJECTIVE  Subjective: Monica reports slow, steady improvement in sciatica pain. Has been consistent with HEP daily. Continues to have most difficulty with prolonged standing. Was able to walk 3 miles last week, noticing slight increase in overall tightness.    Current Pain level: 3/10   Initial Pain level: 7/10   Changes in function: Yes, see goal flow sheet for change in function   Adverse reactions: None;   ,         OBJECTIVE  Objective: Mild palpable tenderness remains R IT band, lateral quad and piriformis. Glute med strength 4+/5 R, glute max 5-/5      ASSESSMENT/PLAN  Updated problem list and treatment plan: Diagnosis 1:  R piriformis syndrome  Pain -  hot/cold therapy, manual therapy, self management, education and home program  Decreased ROM/flexibility - manual therapy, therapeutic exercise, therapeutic activity and home program  Decreased strength - therapeutic exercise, therapeutic activities and home program  Impaired muscle performance - neuro re-education and home program  Decreased function - therapeutic activities and home program  STG/LTGs have been met or progress has been made towards goals:  Yes (See Goal flow sheet completed today.)  Assessment of Progress: The patient's condition is improving.  Self Management Plans:  Patient has been instructed in a home treatment program.  Patient is independent in a home treatment program.  Patient  has been instructed in self management of symptoms.  I have re-evaluated this patient and find that the nature, scope, duration and intensity of the therapy is appropriate for the medical condition of the patient.  Monica continues to require the following intervention to meet STG and LTG's: PT  intervention is no longer required to meet STG/LTG.  We will discharge this patient from PT.    Recommendations:  This patient is ready to be discharged from therapy and continue their home treatment program.    Please refer to the daily flowsheet for treatment today, total treatment time and time spent performing 1:1 timed codes.

## 2021-10-28 PROBLEM — M79.604 BILATERAL LEG PAIN: Status: RESOLVED | Noted: 2021-08-25 | Resolved: 2021-10-28

## 2021-10-28 PROBLEM — G89.29 CHRONIC RIGHT-SIDED LOW BACK PAIN WITH RIGHT-SIDED SCIATICA: Status: RESOLVED | Noted: 2021-08-25 | Resolved: 2021-10-28

## 2021-10-28 PROBLEM — M54.41 CHRONIC RIGHT-SIDED LOW BACK PAIN WITH RIGHT-SIDED SCIATICA: Status: RESOLVED | Noted: 2021-08-25 | Resolved: 2021-10-28

## 2021-10-28 PROBLEM — M79.605 BILATERAL LEG PAIN: Status: RESOLVED | Noted: 2021-08-25 | Resolved: 2021-10-28

## 2021-11-03 ENCOUNTER — IMMUNIZATION (OUTPATIENT)
Dept: FAMILY MEDICINE | Facility: CLINIC | Age: 76
End: 2021-11-03
Payer: COMMERCIAL

## 2021-11-03 DIAGNOSIS — Z23 NEED FOR PROPHYLACTIC VACCINATION AND INOCULATION AGAINST INFLUENZA: Primary | ICD-10-CM

## 2021-11-03 PROCEDURE — G0008 ADMIN INFLUENZA VIRUS VAC: HCPCS

## 2021-11-03 PROCEDURE — 99207 PR NO CHARGE NURSE ONLY: CPT

## 2021-11-03 PROCEDURE — 90662 IIV NO PRSV INCREASED AG IM: CPT

## 2021-11-07 ENCOUNTER — HEALTH MAINTENANCE LETTER (OUTPATIENT)
Age: 76
End: 2021-11-07

## 2022-06-27 ENCOUNTER — TRANSFERRED RECORDS (OUTPATIENT)
Dept: HEALTH INFORMATION MANAGEMENT | Facility: CLINIC | Age: 77
End: 2022-06-27

## 2022-11-17 ENCOUNTER — IMMUNIZATION (OUTPATIENT)
Dept: FAMILY MEDICINE | Facility: CLINIC | Age: 77
End: 2022-11-17
Payer: COMMERCIAL

## 2022-11-17 DIAGNOSIS — Z23 NEED FOR PROPHYLACTIC VACCINATION AND INOCULATION AGAINST INFLUENZA: Primary | ICD-10-CM

## 2022-11-17 PROCEDURE — G0008 ADMIN INFLUENZA VIRUS VAC: HCPCS

## 2022-11-17 PROCEDURE — 99207 PR NO CHARGE NURSE ONLY: CPT

## 2022-11-17 PROCEDURE — 90662 IIV NO PRSV INCREASED AG IM: CPT

## 2023-03-27 ENCOUNTER — TRANSFERRED RECORDS (OUTPATIENT)
Dept: HEALTH INFORMATION MANAGEMENT | Facility: CLINIC | Age: 78
End: 2023-03-27

## 2023-04-27 ENCOUNTER — TRANSFERRED RECORDS (OUTPATIENT)
Dept: HEALTH INFORMATION MANAGEMENT | Facility: CLINIC | Age: 78
End: 2023-04-27

## 2023-05-08 ENCOUNTER — OFFICE VISIT (OUTPATIENT)
Dept: FAMILY MEDICINE | Facility: CLINIC | Age: 78
End: 2023-05-08
Payer: COMMERCIAL

## 2023-05-08 VITALS
BODY MASS INDEX: 39.39 KG/M2 | DIASTOLIC BLOOD PRESSURE: 76 MMHG | HEART RATE: 61 BPM | TEMPERATURE: 97.7 F | OXYGEN SATURATION: 96 % | WEIGHT: 175.1 LBS | HEIGHT: 56 IN | SYSTOLIC BLOOD PRESSURE: 144 MMHG | RESPIRATION RATE: 18 BRPM

## 2023-05-08 DIAGNOSIS — Z23 HIGH PRIORITY FOR 2019-NCOV VACCINE: ICD-10-CM

## 2023-05-08 DIAGNOSIS — R79.89 ELEVATED TSH: ICD-10-CM

## 2023-05-08 DIAGNOSIS — Z00.00 ENCOUNTER FOR MEDICARE ANNUAL WELLNESS EXAM: Primary | ICD-10-CM

## 2023-05-08 DIAGNOSIS — E78.5 HYPERLIPIDEMIA LDL GOAL <100: ICD-10-CM

## 2023-05-08 DIAGNOSIS — E83.52 HYPERCALCEMIA: ICD-10-CM

## 2023-05-08 DIAGNOSIS — C50.919 INFILTRATING DUCTAL CARCINOMA OF BREAST, STAGE 1, UNSPECIFIED LATERALITY (H): ICD-10-CM

## 2023-05-08 DIAGNOSIS — E66.01 MORBID OBESITY DUE TO EXCESS CALORIES (H): ICD-10-CM

## 2023-05-08 DIAGNOSIS — D50.9 IRON DEFICIENCY ANEMIA, UNSPECIFIED IRON DEFICIENCY ANEMIA TYPE: ICD-10-CM

## 2023-05-08 DIAGNOSIS — R06.83 SNORING: ICD-10-CM

## 2023-05-08 DIAGNOSIS — I10 HYPERTENSION GOAL BP (BLOOD PRESSURE) < 140/90: ICD-10-CM

## 2023-05-08 DIAGNOSIS — Z12.11 SCREEN FOR COLON CANCER: ICD-10-CM

## 2023-05-08 LAB
ALBUMIN SERPL BCG-MCNC: 4.5 G/DL (ref 3.5–5.2)
ALP SERPL-CCNC: 54 U/L (ref 35–104)
ALT SERPL W P-5'-P-CCNC: 29 U/L (ref 10–35)
ANION GAP SERPL CALCULATED.3IONS-SCNC: 12 MMOL/L (ref 7–15)
AST SERPL W P-5'-P-CCNC: 29 U/L (ref 10–35)
BASOPHILS # BLD AUTO: 0.1 10E3/UL (ref 0–0.2)
BASOPHILS NFR BLD AUTO: 1 %
BILIRUB SERPL-MCNC: 0.5 MG/DL
BUN SERPL-MCNC: 11.9 MG/DL (ref 8–23)
CALCIUM SERPL-MCNC: 10.6 MG/DL (ref 8.8–10.2)
CHLORIDE SERPL-SCNC: 106 MMOL/L (ref 98–107)
CHOLEST SERPL-MCNC: 155 MG/DL
CREAT SERPL-MCNC: 0.81 MG/DL (ref 0.51–0.95)
CREAT UR-MCNC: 152 MG/DL
DEPRECATED HCO3 PLAS-SCNC: 21 MMOL/L (ref 22–29)
EOSINOPHIL # BLD AUTO: 0.2 10E3/UL (ref 0–0.7)
EOSINOPHIL NFR BLD AUTO: 3 %
ERYTHROCYTE [DISTWIDTH] IN BLOOD BY AUTOMATED COUNT: 13.2 % (ref 10–15)
GFR SERPL CREATININE-BSD FRML MDRD: 75 ML/MIN/1.73M2
GLUCOSE SERPL-MCNC: 96 MG/DL (ref 70–99)
HCT VFR BLD AUTO: 41.4 % (ref 35–47)
HDLC SERPL-MCNC: 56 MG/DL
HGB BLD-MCNC: 13.7 G/DL (ref 11.7–15.7)
IMM GRANULOCYTES # BLD: 0 10E3/UL
IMM GRANULOCYTES NFR BLD: 0 %
LDLC SERPL CALC-MCNC: 71 MG/DL
LYMPHOCYTES # BLD AUTO: 1.9 10E3/UL (ref 0.8–5.3)
LYMPHOCYTES NFR BLD AUTO: 28 %
MCH RBC QN AUTO: 29.5 PG (ref 26.5–33)
MCHC RBC AUTO-ENTMCNC: 33.1 G/DL (ref 31.5–36.5)
MCV RBC AUTO: 89 FL (ref 78–100)
MICROALBUMIN UR-MCNC: 24.2 MG/L
MICROALBUMIN/CREAT UR: 15.92 MG/G CR (ref 0–25)
MONOCYTES # BLD AUTO: 0.5 10E3/UL (ref 0–1.3)
MONOCYTES NFR BLD AUTO: 7 %
NEUTROPHILS # BLD AUTO: 4.3 10E3/UL (ref 1.6–8.3)
NEUTROPHILS NFR BLD AUTO: 62 %
NONHDLC SERPL-MCNC: 99 MG/DL
PLATELET # BLD AUTO: 241 10E3/UL (ref 150–450)
POTASSIUM SERPL-SCNC: 4.4 MMOL/L (ref 3.4–5.3)
PROT SERPL-MCNC: 7 G/DL (ref 6.4–8.3)
RBC # BLD AUTO: 4.64 10E6/UL (ref 3.8–5.2)
SODIUM SERPL-SCNC: 139 MMOL/L (ref 136–145)
TRIGL SERPL-MCNC: 141 MG/DL
TSH SERPL DL<=0.005 MIU/L-ACNC: 1.27 UIU/ML (ref 0.3–4.2)
WBC # BLD AUTO: 7 10E3/UL (ref 4–11)

## 2023-05-08 PROCEDURE — 36415 COLL VENOUS BLD VENIPUNCTURE: CPT | Performed by: FAMILY MEDICINE

## 2023-05-08 PROCEDURE — 82570 ASSAY OF URINE CREATININE: CPT | Performed by: FAMILY MEDICINE

## 2023-05-08 PROCEDURE — 82043 UR ALBUMIN QUANTITATIVE: CPT | Performed by: FAMILY MEDICINE

## 2023-05-08 PROCEDURE — 99214 OFFICE O/P EST MOD 30 MIN: CPT | Mod: 25 | Performed by: FAMILY MEDICINE

## 2023-05-08 PROCEDURE — 91312 COVID-19 BIVALENT 12+ (PFIZER): CPT | Performed by: FAMILY MEDICINE

## 2023-05-08 PROCEDURE — 80061 LIPID PANEL: CPT | Performed by: FAMILY MEDICINE

## 2023-05-08 PROCEDURE — 0124A COVID-19 BIVALENT 12+ (PFIZER): CPT | Performed by: FAMILY MEDICINE

## 2023-05-08 PROCEDURE — 80053 COMPREHEN METABOLIC PANEL: CPT | Performed by: FAMILY MEDICINE

## 2023-05-08 PROCEDURE — G0439 PPPS, SUBSEQ VISIT: HCPCS | Performed by: FAMILY MEDICINE

## 2023-05-08 PROCEDURE — 82306 VITAMIN D 25 HYDROXY: CPT | Performed by: FAMILY MEDICINE

## 2023-05-08 PROCEDURE — 85025 COMPLETE CBC W/AUTO DIFF WBC: CPT | Performed by: FAMILY MEDICINE

## 2023-05-08 PROCEDURE — 84443 ASSAY THYROID STIM HORMONE: CPT | Performed by: FAMILY MEDICINE

## 2023-05-08 RX ORDER — LOSARTAN POTASSIUM 100 MG/1
100 TABLET ORAL DAILY
Qty: 90 TABLET | Refills: 1 | COMMUNITY
Start: 2023-05-08 | End: 2023-08-16

## 2023-05-08 ASSESSMENT — ENCOUNTER SYMPTOMS
NAUSEA: 0
COUGH: 0
CHILLS: 0
MYALGIAS: 0
DIZZINESS: 0
JOINT SWELLING: 0
EYE PAIN: 0
HEMATOCHEZIA: 0
HEADACHES: 0
FREQUENCY: 1
PALPITATIONS: 0
DYSURIA: 0
HEARTBURN: 0
DIARRHEA: 0
FEVER: 0
PARESTHESIAS: 0
BREAST MASS: 0
WEAKNESS: 0
CONSTIPATION: 0
SORE THROAT: 0
HEMATURIA: 0
NERVOUS/ANXIOUS: 1
ABDOMINAL PAIN: 0
SHORTNESS OF BREATH: 1
ARTHRALGIAS: 0

## 2023-05-08 ASSESSMENT — PAIN SCALES - GENERAL: PAINLEVEL: NO PAIN (0)

## 2023-05-08 ASSESSMENT — ACTIVITIES OF DAILY LIVING (ADL): CURRENT_FUNCTION: NO ASSISTANCE NEEDED

## 2023-05-08 NOTE — PROGRESS NOTES
"SUBJECTIVE:   Monica is a 77 year old who presents for Preventive Visit.      5/8/2023    12:55 PM   Additional Questions   Roomed by Latrell     Patient has been advised of split billing requirements and indicates understanding: Yes  Are you in the first 12 months of your Medicare coverage?  No    Healthy Habits:     In general, how would you rate your overall health?  Fair    Frequency of exercise:  2-3 days/week    Duration of exercise:  Less than 15 minutes    Do you usually eat at least 4 servings of fruit and vegetables a day, include whole grains    & fiber and avoid regularly eating high fat or \"junk\" foods?  Yes    Taking medications regularly:  Yes    Medication side effects:  Not applicable    Ability to successfully perform activities of daily living:  No assistance needed    Home Safety:  No safety concerns identified    Hearing Impairment:  Need to ask people to speak up or repeat themselves, difficulty understanding soft or whispered speech and difficulty understanding speech on the telephone    In the past 6 months, have you been bothered by leaking of urine? Yes    In general, how would you rate your overall mental or emotional health?  Good      PHQ-2 Total Score: 2    Additional concerns today:  No     walking    Have you ever done Advance Care Planning? (For example, a Health Directive, POLST, or a discussion with a medical provider or your loved ones about your wishes): No, advance care planning information given to patient to review.  Patient plans to discuss their wishes with loved ones or provider.         Fall risk  Fallen 2 or more times in the past year?: No  Any fall with injury in the past year?: No    Cognitive Screening   1) Repeat 3 items (Leader, Season, Table)    2) Clock draw: NORMAL  3) 3 item recall: Recalls 3 objects  Results: 3 items recalled: COGNITIVE IMPAIRMENT LESS LIKELY    Mini-CogTM Copyright S Mark. Licensed by the author for use in [facility]; reprinted " with permission (celine@[facility]). All rights reserved.      Do you have sleep apnea, excessive snoring or daytime drowsiness?: no    Reviewed and updated as needed this visit by clinical staff   Tobacco  Allergies  Meds   Med Hx  Surg Hx  Fam Hx          Reviewed and updated as needed this visit by Provider   Tobacco  Allergies  Meds   Med Hx  Surg Hx  Fam Hx         Social History     Tobacco Use     Smoking status: Never     Smokeless tobacco: Never   Vaping Use     Vaping status: Never Used   Substance Use Topics     Alcohol use: Yes     Comment: 1-2  glasses of wine a week             5/8/2023    12:45 PM   Alcohol Use   Prescreen: >3 drinks/day or >7 drinks/week? Not Applicable     Do you have a current opioid prescription? No  Do you use any other controlled substances or medications that are not prescribed by a provider? None          Hyperlipidemia Follow-Up      Are you regularly taking any medication or supplement to lower your cholesterol?   Yes- simvastatin    Are you having muscle aches or other side effects that you think could be caused by your cholesterol lowering medication?  No    Hypertension Follow-up      Do you check your blood pressure regularly outside of the clinic? Yes     Are you following a low salt diet? Yes    Are your blood pressures ever more than 140 on the top number (systolic) OR more   than 90 on the bottom number (diastolic), for example 140/90? No      Current providers sharing in care for this patient include:   Patient Care Team:  Heather Adams MD as PCP - General (Family Practice)  Heather Adams MD as Assigned PCP    The following health maintenance items are reviewed in Epic and correct as of today:  Health Maintenance   Topic Date Due     ANNUAL REVIEW OF HM ORDERS  Never done     COLORECTAL CANCER SCREENING  05/24/2023     MEDICARE ANNUAL WELLNESS VISIT  08/09/2023     FALL RISK ASSESSMENT  05/08/2024     LIPID  08/28/2025     DTAP/TDAP/TD IMMUNIZATION (4  - Td or Tdap) 08/14/2027     ADVANCE CARE PLANNING  05/08/2028     DEXA  06/16/2030     HEPATITIS C SCREENING  Completed     PHQ-2 (once per calendar year)  Completed     INFLUENZA VACCINE  Completed     Pneumococcal Vaccine: 65+ Years  Completed     ZOSTER IMMUNIZATION  Completed     COVID-19 Vaccine  Completed     IPV IMMUNIZATION  Aged Out     MENINGITIS IMMUNIZATION  Aged Out     MAMMO SCREENING  Discontinued     BP Readings from Last 3 Encounters:   05/08/23 (!) 144/76   09/03/20 138/78   08/01/19 132/72    Wt Readings from Last 3 Encounters:   05/08/23 79.4 kg (175 lb 1.6 oz)   09/03/20 78.9 kg (174 lb)   08/01/19 79.3 kg (174 lb 12.8 oz)                  Mammogram Screening: Mammogram Screening: Recommended mammography every 1-2 years with patient discussion and risk factor consideration    Mammogram Screening: Recommended mammography every 1-2 years with patient discussion and risk factor consideration  Pertinent mammograms are reviewed under the imaging tab.    Review of Systems   Constitutional: Negative for chills and fever.   HENT: Positive for hearing loss. Negative for congestion, ear pain and sore throat.    Eyes: Positive for visual disturbance. Negative for pain.   Respiratory: Positive for shortness of breath. Negative for cough.    Cardiovascular: Negative for chest pain, palpitations and peripheral edema.   Gastrointestinal: Negative for abdominal pain, constipation, diarrhea, heartburn, hematochezia and nausea.   Breasts:  Negative for tenderness, breast mass and discharge.   Genitourinary: Positive for frequency, genital sores and urgency. Negative for dysuria, hematuria, pelvic pain, vaginal bleeding and vaginal discharge.   Musculoskeletal: Negative for arthralgias, joint swelling and myalgias.   Skin: Positive for rash.   Neurological: Negative for dizziness, weakness, headaches and paresthesias.   Psychiatric/Behavioral: Negative for mood changes. The patient is nervous/anxious.      Left  "knee cortisone TCO - torn meniscus  Has hearing aids  Gradual vision change UTD with exam.    SOB - in winter with mask.   Uses nizoral powder prn.        OBJECTIVE:   BP (!) 144/76   Pulse 61   Temp 97.7  F (36.5  C) (Oral)   Resp 18   Ht 1.415 m (4' 7.7\")   Wt 79.4 kg (175 lb 1.6 oz)   LMP  (LMP Unknown)   SpO2 96%   BMI 39.68 kg/m   Estimated body mass index is 39.68 kg/m  as calculated from the following:    Height as of this encounter: 1.415 m (4' 7.7\").    Weight as of this encounter: 79.4 kg (175 lb 1.6 oz).  Physical Exam  GENERAL APPEARANCE: alert, no distress and obese  EYES: Eyes grossly normal to inspection, PERRL and conjunctivae and sclerae normal  HENT: ear canals and TM's normal, nose and mouth without ulcers or lesions, oropharynx clear and oral mucous membranes moist  NECK: no adenopathy, no asymmetry, masses, or scars and thyroid normal to palpation  RESP: lungs clear to auscultation - no rales, rhonchi or wheezes  BREAST: normal without masses, tenderness or nipple discharge and no palpable axillary masses or adenopathy  CV: regular rate and rhythm, normal S1 S2, no S3 or S4, no murmur, click or rub, no peripheral edema and peripheral pulses strong  ABDOMEN: soft, nontender, no hepatosplenomegaly, no masses and bowel sounds normal   (female): normal female external genitalia, normal urethral meatus, vaginal mucosal atrophy noted, normal cervix, adnexae, and uterus without masses or abnormal discharge  MS: no musculoskeletal defects are noted and gait is age appropriate without ataxia  SKIN: Erythematous rash beneath the breast and in the groin folds bilaterally.  No open areas noted.  No satellite lesions noted currently.  Significant alopecia to the scalp  NEURO: Normal strength and tone, sensory exam grossly normal, mentation intact and speech normal  PSYCH: mentation appears normal and affect normal/bright    Diagnostic Test Results:  Labs reviewed in Epic  Results for orders placed " or performed in visit on 05/08/23   Comprehensive metabolic panel (BMP + Alb, Alk Phos, ALT, AST, Total. Bili, TP)     Status: Abnormal   Result Value Ref Range    Sodium 139 136 - 145 mmol/L    Potassium 4.4 3.4 - 5.3 mmol/L    Chloride 106 98 - 107 mmol/L    Carbon Dioxide (CO2) 21 (L) 22 - 29 mmol/L    Anion Gap 12 7 - 15 mmol/L    Urea Nitrogen 11.9 8.0 - 23.0 mg/dL    Creatinine 0.81 0.51 - 0.95 mg/dL    Calcium 10.6 (H) 8.8 - 10.2 mg/dL    Glucose 96 70 - 99 mg/dL    Alkaline Phosphatase 54 35 - 104 U/L    AST 29 10 - 35 U/L    ALT 29 10 - 35 U/L    Protein Total 7.0 6.4 - 8.3 g/dL    Albumin 4.5 3.5 - 5.2 g/dL    Bilirubin Total 0.5 <=1.2 mg/dL    GFR Estimate 75 >60 mL/min/1.73m2   Lipid panel reflex to direct LDL Non-fasting     Status: Normal   Result Value Ref Range    Cholesterol 155 <200 mg/dL    Triglycerides 141 <150 mg/dL    Direct Measure HDL 56 >=50 mg/dL    LDL Cholesterol Calculated 71 <=100 mg/dL    Non HDL Cholesterol 99 <130 mg/dL    Narrative    Cholesterol  Desirable:  <200 mg/dL    Triglycerides  Normal:  Less than 150 mg/dL  Borderline High:  150-199 mg/dL  High:  200-499 mg/dL  Very High:  Greater than or equal to 500 mg/dL    Direct Measure HDL  Female:  Greater than or equal to 50 mg/dL   Male:  Greater than or equal to 40 mg/dL    LDL Cholesterol  Desirable:  <100mg/dL  Above Desirable:  100-129 mg/dL   Borderline High:  130-159 mg/dL   High:  160-189 mg/dL   Very High:  >= 190 mg/dL    Non HDL Cholesterol  Desirable:  130 mg/dL  Above Desirable:  130-159 mg/dL  Borderline High:  160-189 mg/dL  High:  190-219 mg/dL  Very High:  Greater than or equal to 220 mg/dL   TSH with free T4 reflex     Status: Normal   Result Value Ref Range    TSH 1.27 0.30 - 4.20 uIU/mL   Albumin Random Urine Quantitative with Creat Ratio     Status: None   Result Value Ref Range    Creatinine Urine mg/dL 152.0 mg/dL    Albumin Urine mg/L 24.2 mg/L    Albumin Urine mg/g Cr 15.92 0.00 - 25.00 mg/g Cr   CBC with  platelets and differential     Status: None   Result Value Ref Range    WBC Count 7.0 4.0 - 11.0 10e3/uL    RBC Count 4.64 3.80 - 5.20 10e6/uL    Hemoglobin 13.7 11.7 - 15.7 g/dL    Hematocrit 41.4 35.0 - 47.0 %    MCV 89 78 - 100 fL    MCH 29.5 26.5 - 33.0 pg    MCHC 33.1 31.5 - 36.5 g/dL    RDW 13.2 10.0 - 15.0 %    Platelet Count 241 150 - 450 10e3/uL    % Neutrophils 62 %    % Lymphocytes 28 %    % Monocytes 7 %    % Eosinophils 3 %    % Basophils 1 %    % Immature Granulocytes 0 %    Absolute Neutrophils 4.3 1.6 - 8.3 10e3/uL    Absolute Lymphocytes 1.9 0.8 - 5.3 10e3/uL    Absolute Monocytes 0.5 0.0 - 1.3 10e3/uL    Absolute Eosinophils 0.2 0.0 - 0.7 10e3/uL    Absolute Basophils 0.1 0.0 - 0.2 10e3/uL    Absolute Immature Granulocytes 0.0 <=0.4 10e3/uL   CBC with platelets and differential     Status: None    Narrative    The following orders were created for panel order CBC with platelets and differential.  Procedure                               Abnormality         Status                     ---------                               -----------         ------                     CBC with platelets and d...[[id number]]                      Final result                 Please view results for these tests on the individual orders.       ASSESSMENT / PLAN:   (Z00.00) Encounter for Medicare annual wellness exam  (primary encounter diagnosis)  Comment: Fasting  Plan: PRIMARY CARE FOLLOW-UP SCHEDULING        Screening and preventive care discussed    (R79.89) Elevated TSH  Comment: Previously elevated TSH is rechecked today  Plan: TSH with free T4 reflex        Normal thyroid testing noted    (I10) Hypertension goal BP (blood pressure) < 140/90  Comment: Blood pressure elevated  Plan: Comprehensive metabolic panel (BMP + Alb, Alk         Phos, ALT, AST, Total. Bili, TP), Albumin         Random Urine Quantitative with Creat Ratio        Going to increase from 50 mg of losartan to 100 mg daily.  A new prescription is  sent for her and follow-up blood pressure testing in 4 to 6 weeks is recommended.    (E66.01) Morbid obesity due to excess calories (H)  Comment: BMI greater than 35 with concomitant hyperlipidemia and hypertension  Plan: Healthy diet and exercise recommended  Wt Readings from Last 5 Encounters:   05/08/23 79.4 kg (175 lb 1.6 oz)   09/03/20 78.9 kg (174 lb)   08/01/19 79.3 kg (174 lb 12.8 oz)   08/23/18 78 kg (172 lb)   06/28/18 79.8 kg (176 lb)         (E78.5) Hyperlipidemia LDL goal <100  Comment: Cholesterol under good control  Plan: Comprehensive metabolic panel (BMP + Alb, Alk         Phos, ALT, AST, Total. Bili, TP), Lipid panel         reflex to direct LDL Non-fasting        Simvastatin refill sent    (D50.9) Iron deficiency anemia, unspecified iron deficiency anemia type  Comment: Reassessment of blood counts today  Plan: CBC with platelets and differential        Normal hemoglobin noted    (C50.919) Infiltrating ductal carcinoma of breast, stage 1, unspecified laterality (H)  Comment: Diagnosed in 2010  Plan: Completed treatment.  Routine mammogram screening continues.    (Z12.11) Screen for colon cancer  Comment: Colonoscopy in 2013  Plan: Colonoscopy Screening  Referral        Referral for repeat screening.  Desires appointment for testing in [place]    (R06.83) Snoring  Comment:   Plan: Adult Sleep Eval & Management          Referral        Daytime fatigue noted to some degree.  With this and her snoring referral has been placed for sleep evaluation.    (Z23) High priority for 2019-nCoV vaccine  Comment:   Plan: COVID-19 BIVALENT 12+ (PFIZER)        Over age 65 and desires additional COVID booster.  Will be traveling to [facility] soon and this is a reasonable request.  Vaccine given    (E83.52) Hypercalcemia  Comment: Elevated calcium on testing noted today.  Plan: Vitamin D Deficiency, Parathyroid Hormone         Intact        Addition of vitamin D testing parathyroid hormone level  "placed.  Consideration of repeat calcium with ionized calcium level planned when she returns from her trip.  Order is available.      Patient has been advised of split billing requirements and indicates understanding: Yes      COUNSELING:  Reviewed preventive health counseling, as reflected in patient instructions       Regular exercise       Healthy diet/nutrition       Vision screening       Hearing screening       Dental care       Bladder control       Fall risk prevention       Immunizations    Vaccinated for: Covid-19             Osteoporosis prevention/bone health       Colon cancer screening      BMI:   Estimated body mass index is 39.68 kg/m  as calculated from the following:    Height as of this encounter: 1.415 m (4' 7.7\").    Weight as of this encounter: 79.4 kg (175 lb 1.6 oz).   Weight management plan: Discussed healthy diet and exercise guidelines      She reports that she has never smoked. She has never used smokeless tobacco.      Appropriate preventive services were discussed with this patient, including applicable screening as appropriate for cardiovascular disease, diabetes, osteopenia/osteoporosis, and glaucoma.  As appropriate for age/gender, discussed screening for colorectal cancer, prostate cancer, breast cancer, and cervical cancer. Checklist reviewing preventive services available has been given to the patient.    Reviewed patients plan of care and provided an AVS. The Basic Care Plan (routine screening as documented in Health Maintenance) for Monica meets the Care Plan requirement. This Care Plan has been established and reviewed with the Patient.      Heather Adams MD  [facility]    Identified Health Risks:    I have reviewed Opioid Use Disorder and Substance Use Disorder risk factors and made any needed referrals.       The patient was provided with suggestions to help her develop a healthy physical lifestyle.  The patient was provided with written information " regarding signs of hearing loss.  Information on urinary incontinence and treatment options given to patient.        Patient Instructions     Labs today.  I will send refills when results return.    Colon cancer screening recommended when you return from trip.    Referral for evaluation of sleep - snoring.     Mammogram due in June.    COVID booster today.      To establish with Gynecology -  You can call [phone] to schedule this at the [facility] building in [place]

## 2023-05-08 NOTE — PATIENT INSTRUCTIONS
Labs today.  I will send refills when results return.    Colon cancer screening recommended when you return from trip.    Referral for evaluation of sleep - snoring.     Mammogram due in June.    COVID booster today.      To establish with Gynecology -  You can call [phone] to schedule this at the [facility] in [place]         Patient Education   Personalized Prevention Plan  You are due for the preventive services outlined below.  Your care team is available to assist you in scheduling these services.  If you have already completed any of these items, please share that information with your care team to update in your medical record.  Health Maintenance Due   Topic Date Due    Colorectal Cancer Screening  05/24/2023     Your Health Risk Assessment indicates you feel you are not in good health    A healthy lifestyle helps keep the body fit and the mind alert. It helps protect you from disease, helps you fight disease, and helps prevent chronic disease (disease that doesn't go away) from getting worse. This is important as you get older and begin to notice twinges in muscles and joints and a decline in the strength and stamina you once took for granted. A healthy lifestyle includes good healthcare, good nutrition, weight control, recreation, and regular exercise. Avoid harmful substances and do what you can to keep safe. Another part of a healthy lifestyle is stay mentally active and socially involved.    Good healthcare   Have a wellness visit every year.   If you have new symptoms, let us know right away. Don't wait until the next checkup.   Take medicines exactly as prescribed and keep your medicines in a safe place. Tell us if your medicine causes problems.   Healthy diet and weight control   Eat 3 or 4 small, nutritious, low-fat, high-fiber meals a day. Include a variety of fruits, vegetables, and whole-grain foods.   Make sure you get enough calcium in your diet. Calcium, vitamin D, and exercise  help prevent osteoporosis (bone thinning).   If you live alone, try eating with others when you can. That way you get a good meal and have company while you eat it.   Try to keep a healthy weight. If you eat more calories than your body uses for energy, it will be stored as fat and you will gain weight.     Recreation   Recreation is not limited to sports and team events. It includes any activity that provides relaxation, interest, enjoyment, and exercise. Recreation provides an outlet for physical, mental, and social energy. It can give a sense of worth and achievement. It can help you stay healthy.    Mental Exercise and Social Involvement  Mental and emotional health is as important as physical health. Keep in touch with friends and family. Stay as active as possible. Continue to learn and challenge yourself.   Things you can do to stay mentally active are:  Learn something new, like a foreign language or musical instrument.   Play SCRABBLE or do crossword puzzles. If you cannot find people to play these games with you at home, you can play them with others on your computer through the Internet.   Join a games club--anything from card games to chess or checkers or lawn bowling.   Start a new hobby.   Go back to school.   Volunteer.   Read.   Keep up with world events.    Signs of Hearing Loss  Hearing loss is a problem shared by many people. In fact, it's one of the most common health problems, particularly as people age. Most people aged 65 and older have some hearing loss. By age 80, almost everyone does. Hearing loss often occurs slowly over the years. So, you may not realize your hearing has gotten worse.   When sudden hearing loss occurs, it's important to contact your healthcare provider right away. Your provider will do a medical exam and a hearing exam as soon as possible. This is to help find the cause and type of your sudden hearing loss. Based on your diagnosis, your healthcare provider will discuss  possible treatments.      Hearing much better with one ear can be a sign of hearing loss.     Have your hearing checked  Call your healthcare provider if you:   Have to strain to hear normal conversation  Have to watch other people s faces very carefully to follow what they re saying  Need to ask people to repeat what they ve said  Often misunderstand what people are saying  Turn the volume of the television or radio up so high that others complain  Feel that people are mumbling when they re talking to you  Find that the effort to hear leaves you feeling tired and irritated  Notice, when using the phone, that you hear better with one ear than the other  [employer] last reviewed this educational content on 6/1/2022 2000-2022 [employer]. All rights reserved. This information is not intended as a substitute for professional medical care. Always follow your healthcare professional's instructions.          Urinary Incontinence, Female (Adult)   Urinary incontinence means loss of bladder control. This problem affects many women, especially as they get older. If you have incontinence, you may be embarrassed to ask for help. But know that this problem can be treated.   Types of Incontinence  There are different types of incontinence. Two of the main types are described here. You can have more than one type.   Stress incontinence. With this type, urine leaks when pressure (stress) is put on the bladder. This may happen when you cough, sneeze, or laugh. Stress incontinence most often occurs because the pelvic floor muscles that support the bladder and urethra are weak. This can happen after pregnancy and vaginal childbirth or a hysterectomy. It can also be due to excess body weight or hormone changes.  Urge incontinence (also called overactive bladder). With this type, a sudden urge to urinate is felt often. This may happen even though there may not be much urine in the bladder. The need to urinate often  during the night is common. Urge incontinence most often occurs because of bladder spasms. This may be due to bladder irritation or infection. Damage to bladder nerves or pelvic muscles, constipation, and certain medicines can also lead to urge incontinence.  Treatment depends on the cause. Further evaluation is needed to find the type you have. This will likely include an exam and certain tests. Based on the results, you and your healthcare provider can then plan treatment. Until a diagnosis is made, the home care tips below can help ease symptoms.   Home care  Do pelvic floor muscle exercises, if they are prescribed. The pelvic floor muscles help support the bladder and urethra. Many women find that their symptoms improve when doing special exercises that strengthen these muscles. To do the exercises, contract the muscles you would use to stop your stream of urine. But do this when you re not urinating. Hold for 10 seconds, then relax. Repeat 10 to 20 times in a row, at least 3 times a day. Your healthcare provider may give you other instructions for how to do the exercises and how often.  Keep a bladder diary. This helps track how often and how much you urinate over a set period of time. Bring this diary with you to your next visit with the provider. The information can help your provider learn more about your bladder problem.  Lose weight, if advised to by your provider. Extra weight puts pressure on the bladder. Your provider can help you create a weight-loss plan that s right for you. This may include exercising more and making certain diet changes.  Don't have foods and drinks that may irritate the bladder. These can include alcohol and caffeinated drinks.  Quit smoking. Smoking and other tobacco use can lead to a long-term (chronic) cough that strains the pelvic floor muscles. Smoking may also damage the bladder and urethra. Talk with your provider about treatments or methods you can use to quit smoking.  If  drinking large amounts of fluid makes you have symptoms, you may be advised to limit your fluid intake. You may also be advised to drink most of your fluids during the day and to limit fluids at night.  If you re worried about urine leakage or accidents, you may wear absorbent pads to catch urine. Change the pads often. This helps reduce discomfort. It may also reduce the risk of skin or bladder infections.    Follow-up care  Follow up with your healthcare provider, or as directed. It may take some to find the right treatment for your problem. But healthy lifestyle changes can be made right away. These include such things as exercising on a regular basis, eating a healthy diet, losing weight (if needed), and quitting smoking. Your treatment plan may include special therapies or medicines. Certain procedures or surgery may also be options. Talk about any questions you have with your provider.   When to seek medical advice  Call the healthcare provider right away if any of these occur:  Fever of 100.4 F (38 C) or higher, or as directed by your provider  Bladder pain or fullness  Belly swelling  Nausea or vomiting  Back pain  Weakness, dizziness, or fainting  [employer] last reviewed this educational content on 1/1/2020 2000-2022 [employer]. All rights reserved. This information is not intended as a substitute for professional medical care. Always follow your healthcare professional's instructions.

## 2023-05-09 RX ORDER — SIMVASTATIN 40 MG
40 TABLET ORAL AT BEDTIME
Qty: 90 TABLET | Refills: 3 | Status: SHIPPED | OUTPATIENT
Start: 2023-05-09 | End: 2024-05-14

## 2023-05-10 LAB — DEPRECATED CALCIDIOL+CALCIFEROL SERPL-MC: 31 UG/L (ref 20–75)

## 2023-05-10 NOTE — RESULT ENCOUNTER NOTE
Your blood sugar, kidney function, and liver testing are all normal.  Your calcium level is borderline high.  This is a new finding for you and I would recommend follow-up testing in 6 to 8 weeks.  Often times repeat testing returns normal.  In addition I have requested addition of a vitamin D level and parathyroid hormone level to assess the elevated calcium now.  These results will be forwarded to you when available.  Your cholesterol testing is under very good control.  I would recommend you continue your current treatment.  Thyroid testing is normal.  Your urine test does not reveal elevated protein.  This is normal as well.  Blood cell counts are normal.  Please call or MyChart message me if you have any questions.      PSK

## 2023-05-11 NOTE — RESULT ENCOUNTER NOTE
Monica,  Your vitamin D level was normal  Sincerely,  Sona Cadena MD  (for Dr. Adams who is out of the office today)

## 2023-06-27 ENCOUNTER — TRANSFERRED RECORDS (OUTPATIENT)
Dept: HEALTH INFORMATION MANAGEMENT | Facility: CLINIC | Age: 78
End: 2023-06-27
Payer: COMMERCIAL

## 2023-07-27 ENCOUNTER — TRANSFERRED RECORDS (OUTPATIENT)
Dept: HEALTH INFORMATION MANAGEMENT | Facility: CLINIC | Age: 78
End: 2023-07-27
Payer: COMMERCIAL

## 2023-08-16 ENCOUNTER — MYC MEDICAL ADVICE (OUTPATIENT)
Dept: FAMILY MEDICINE | Facility: CLINIC | Age: 78
End: 2023-08-16
Payer: COMMERCIAL

## 2023-08-16 ENCOUNTER — NURSE TRIAGE (OUTPATIENT)
Dept: NURSING | Facility: CLINIC | Age: 78
End: 2023-08-16
Payer: COMMERCIAL

## 2023-08-16 DIAGNOSIS — I10 HYPERTENSION GOAL BP (BLOOD PRESSURE) < 140/90: Primary | ICD-10-CM

## 2023-08-16 RX ORDER — LOSARTAN POTASSIUM 100 MG/1
100 TABLET ORAL DAILY
Qty: 90 TABLET | Refills: 0 | Status: SHIPPED | OUTPATIENT
Start: 2023-08-16 | End: 2023-09-08

## 2023-08-16 NOTE — TELEPHONE ENCOUNTER
Nurse Triage SBAR    Is this a 2nd Level Triage? NO    Situation:  ear congestion    Background:  patient states that her ear is popping and wondering if she can clean them at home.  Patient denies pain, redness or drainage.    Assessment:  ear wax buildup    Protocol Recommended Disposition:   Home Care    Recommendation:  patient verbalized understanding of care advice    Cristiana Aaron RN on 8/16/2023 at 3:05 PM      Does the patient meet one of the following criteria for ADS visit consideration? No      Reason for Disposition   Earwax removal, questions about    Additional Information   Negative: Patient sounds very sick or weak to the triager   Negative: Sudden onset of ear pain or bleeding after long, thin object was inserted into the ear canal (e.g., pencil, Q-tip)   Negative: Ear pain after ear syringing (i.e., squirting liquid into ear canal to remove wax) and severe   Negative: Ear pain after ear syringing (i.e., squirting liquid into ear canal to remove wax) and lasts > 1 hour   Negative: Walking is very unsteady or feels very dizzy  (Exception: Brief dizziness that occurs with ear syringing.)   Negative: Redness or swelling of outer ear (ear lobe)   Negative: Pus from the ear canal (e.g., yellow or green discharge)   Negative: History of ear drum perforation, tubes or ear surgery   Negative: Complete hearing loss in either ear   Negative: Diabetes mellitus   Negative: Patient wants to be seen   Negative: Earwax problem and no improvement using CARE ADVICE   Negative: Earwax problem and not willing to try CARE ADVICE    Protocols used: Earwax-A-OH

## 2023-08-16 NOTE — TELEPHONE ENCOUNTER
Provider:  Please see patient  request for a refill of her losartan.    Please note that there is only a historical Prescription(s) on file for the losartan 100 mg and none that was sen to the pharmacy.    If you send a new Prescription(s) for losartan 100 mg , please make sure the team notifies the patient and ensures that she is only taking 1 tablet of losartan   100 mg daily.    3. Please note that she did not follow up as advised below in you office note.    Per OV note dated 5/8/2023 from  Dr. Adams is as follows:      (I10) Hypertension goal BP (blood pressure) < 140/90  Comment: Blood pressure elevated  Plan: Comprehensive metabolic panel (BMP + Alb, Alk         Phos, ALT, AST, Total. Bili, TP), Albumin         Random Urine Quantitative with Creat Ratio        Going to increase from 50 mg of losartan to 100 mg daily.  A new prescription is sent for her and follow-up blood pressure testing in 4 to 6 weeks is recommended.

## 2023-09-07 ENCOUNTER — MYC MEDICAL ADVICE (OUTPATIENT)
Dept: FAMILY MEDICINE | Facility: CLINIC | Age: 78
End: 2023-09-07
Payer: COMMERCIAL

## 2023-09-07 DIAGNOSIS — I10 HYPERTENSION GOAL BP (BLOOD PRESSURE) < 140/90: ICD-10-CM

## 2023-09-07 NOTE — TELEPHONE ENCOUNTER
Routing to provider to review.    Please see [employer] message regarding BP readings.    Kristina Kjellberg, MSN, RN  [facility] Triage

## 2023-09-08 RX ORDER — LOSARTAN POTASSIUM 100 MG/1
100 TABLET ORAL DAILY
Qty: 90 TABLET | Refills: 1 | Status: SHIPPED | OUTPATIENT
Start: 2023-09-08 | End: 2024-03-21

## 2023-10-10 ENCOUNTER — MYC MEDICAL ADVICE (OUTPATIENT)
Dept: FAMILY MEDICINE | Facility: CLINIC | Age: 78
End: 2023-10-10
Payer: COMMERCIAL

## 2023-10-12 ENCOUNTER — MYC MEDICAL ADVICE (OUTPATIENT)
Dept: FAMILY MEDICINE | Facility: CLINIC | Age: 78
End: 2023-10-12
Payer: COMMERCIAL

## 2023-10-12 DIAGNOSIS — B37.2 CANDIDAL INTERTRIGO: ICD-10-CM

## 2023-10-12 RX ORDER — NYSTATIN 100000 [USP'U]/G
POWDER TOPICAL
Qty: 60 G | Refills: 4 | Status: SHIPPED | OUTPATIENT
Start: 2023-10-12 | End: 2024-04-26

## 2023-10-12 NOTE — TELEPHONE ENCOUNTER
Discussed lid hygiene, warm compress and eyelid wash. Routing to refill pool to review.    Mary Kay, RN  [facility] Primary Care Triage

## 2023-10-24 ENCOUNTER — TELEPHONE (OUTPATIENT)
Dept: FAMILY MEDICINE | Facility: CLINIC | Age: 78
End: 2023-10-24
Payer: COMMERCIAL

## 2023-11-02 ENCOUNTER — OFFICE VISIT (OUTPATIENT)
Dept: FAMILY MEDICINE | Facility: CLINIC | Age: 78
End: 2023-11-02
Payer: COMMERCIAL

## 2023-11-02 VITALS
WEIGHT: 174.6 LBS | HEIGHT: 56 IN | HEART RATE: 66 BPM | DIASTOLIC BLOOD PRESSURE: 80 MMHG | RESPIRATION RATE: 18 BRPM | BODY MASS INDEX: 39.28 KG/M2 | TEMPERATURE: 96.8 F | SYSTOLIC BLOOD PRESSURE: 138 MMHG | OXYGEN SATURATION: 97 %

## 2023-11-02 DIAGNOSIS — H02.102 ECTROPION OF BOTH LOWER EYELIDS, UNSPECIFIED ECTROPION TYPE: ICD-10-CM

## 2023-11-02 DIAGNOSIS — E21.3 HYPERPARATHYROIDISM (H): ICD-10-CM

## 2023-11-02 DIAGNOSIS — Z01.818 PREOP GENERAL PHYSICAL EXAM: Primary | ICD-10-CM

## 2023-11-02 DIAGNOSIS — E83.52 HYPERCALCEMIA: ICD-10-CM

## 2023-11-02 DIAGNOSIS — E66.01 MORBID OBESITY (H): ICD-10-CM

## 2023-11-02 DIAGNOSIS — H02.105 ECTROPION OF BOTH LOWER EYELIDS, UNSPECIFIED ECTROPION TYPE: ICD-10-CM

## 2023-11-02 DIAGNOSIS — I10 HYPERTENSION GOAL BP (BLOOD PRESSURE) < 140/90: ICD-10-CM

## 2023-11-02 DIAGNOSIS — Z23 ENCOUNTER FOR IMMUNIZATION: ICD-10-CM

## 2023-11-02 LAB
ALBUMIN SERPL BCG-MCNC: 4.6 G/DL (ref 3.5–5.2)
ANION GAP SERPL CALCULATED.3IONS-SCNC: 11 MMOL/L (ref 7–15)
BUN SERPL-MCNC: 14.6 MG/DL (ref 8–23)
CA-I BLD-MCNC: 5.6 MG/DL (ref 4.4–5.2)
CALCIUM SERPL-MCNC: 11.3 MG/DL (ref 8.8–10.2)
CHLORIDE SERPL-SCNC: 104 MMOL/L (ref 98–107)
CREAT SERPL-MCNC: 0.78 MG/DL (ref 0.51–0.95)
DEPRECATED HCO3 PLAS-SCNC: 22 MMOL/L (ref 22–29)
EGFRCR SERPLBLD CKD-EPI 2021: 78 ML/MIN/1.73M2
GLUCOSE SERPL-MCNC: 106 MG/DL (ref 70–99)
PHOSPHATE SERPL-MCNC: 2.2 MG/DL (ref 2.5–4.5)
POTASSIUM SERPL-SCNC: 4.7 MMOL/L (ref 3.4–5.3)
PTH-INTACT SERPL-MCNC: 85 PG/ML (ref 15–65)
SODIUM SERPL-SCNC: 137 MMOL/L (ref 135–145)
VIT D+METAB SERPL-MCNC: 34 NG/ML (ref 20–50)

## 2023-11-02 PROCEDURE — 90662 IIV NO PRSV INCREASED AG IM: CPT | Performed by: FAMILY MEDICINE

## 2023-11-02 PROCEDURE — 80069 RENAL FUNCTION PANEL: CPT | Performed by: FAMILY MEDICINE

## 2023-11-02 PROCEDURE — 90471 IMMUNIZATION ADMIN: CPT | Performed by: FAMILY MEDICINE

## 2023-11-02 PROCEDURE — 83970 ASSAY OF PARATHORMONE: CPT | Performed by: FAMILY MEDICINE

## 2023-11-02 PROCEDURE — 82330 ASSAY OF CALCIUM: CPT | Performed by: FAMILY MEDICINE

## 2023-11-02 PROCEDURE — 99214 OFFICE O/P EST MOD 30 MIN: CPT | Mod: 25 | Performed by: FAMILY MEDICINE

## 2023-11-02 PROCEDURE — 82306 VITAMIN D 25 HYDROXY: CPT | Performed by: FAMILY MEDICINE

## 2023-11-02 PROCEDURE — 36415 COLL VENOUS BLD VENIPUNCTURE: CPT | Performed by: FAMILY MEDICINE

## 2023-11-02 RX ORDER — RESPIRATORY SYNCYTIAL VIRUS VACCINE 120MCG/0.5
0.5 KIT INTRAMUSCULAR ONCE
Qty: 1 EACH | Refills: 0 | Status: CANCELLED | OUTPATIENT
Start: 2023-11-02 | End: 2023-11-02

## 2023-11-02 ASSESSMENT — PAIN SCALES - GENERAL: PAINLEVEL: NO PAIN (0)

## 2023-11-02 NOTE — PROGRESS NOTES
[address] [address]  Phone: [phone]  Primary Provider: Heather Rapp  Pre-op Performing Provider: HEATHER RAPP      PREOPERATIVE EVALUATION:  Today's date: 11/2/2023    Monica is a 78 year old female who presents for a preoperative evaluation.      5/8/2023    12:55 PM   Additional Questions   Roomed by Latrell       Surgical Information:  Surgery/Procedure: eye procedure (dacryocystorhinostomy)   Surgery Location: [facility] in [place]   Surgeon: Dr. Vlad Roman  Surgery Date: 11/17/2023  Time of Surgery: N/A  Where patient plans to recover: At home with family  Fax number for surgical facility: [phone]    Assessment & Plan     The proposed surgical procedure is considered LOW risk.    Preop general physical exam  Clearance given for repair of Ectropion of both lower eyelids, unspecified ectropion type  Proceed with surgery as planned.      Hypercalcemia  Additional evaluation of the high calcium found on prior testing - findings consistent with hyperparathyroidism.  Assess bone density planned.   Referral to endocrinology as noted.    - Parathyroid Hormone Intact; Future  - Vitamin D Deficiency; Future  - Renal panel (Alb, BUN, Ca, Cl, CO2, Creat, Gluc, Phos, K, Na)  - Ionized Calcium  - Parathyroid Hormone Intact  - Vitamin D Deficiency  - Adult Endocrinology  Referral; Future  - DX Hip/Pelvis/Spine; Future    Hypertension goal BP (blood pressure) < 140/90  Well controlled BP.  Continue current losartain.    Morbid obesity (H)  Wt Readings from Last 5 Encounters:   11/02/23 79.2 kg (174 lb 9.6 oz)   05/08/23 79.4 kg (175 lb 1.6 oz)   09/03/20 78.9 kg (174 lb)   08/01/19 79.3 kg (174 lb 12.8 oz)   08/23/18 78 kg (172 lb)   Healthy diet and exercise recommended.        Encounter for immunization  Flu vaccine today.  RSV vaccine at pharmacy if desired.      Hyperparathyroidism (H24)  Assess bone density.  Referral to  endocrine.  Proceed with surgery as planned and evaluation with endocrinology when available.    - Adult Endocrinology  Referral; Future  - DX Hip/Pelvis/Spine; Future      Possible Sleep Apnea: monitor for signs of desaturation, no known history of sleep apnea.       11/5/2023     3:00 PM   STOP-Bang Total Score   Total Score 4   Risk Stratification 3 - 4: Moderate Risk for CAMERON          Risks and Recommendations:  The patient has the following additional risks and recommendations for perioperative complications:   - No identified additional risk factors other than previously addressed    Antiplatelet or Anticoagulation Medication Instructions:   - aspirin: Discontinue aspirin 7-10 days prior to procedure to reduce bleeding risk. It should be resumed postoperatively.     Additional Medication Instructions:   - ACE/ARB: Continue without modification (e.g., MAC anesthesia, neurosurgery, spine surgery, heart failure, or labile hypertension with risk of hypertension).    RECOMMENDATION:  APPROVAL GIVEN to proceed with proposed procedure, without further diagnostic evaluation.    Review of the result(s) of each unique test - calcium  Ordering of each unique test  Prescription drug management        Subjective       HPI related to upcoming procedure:  78 year old with ectropion lower lids bilaterally resulting in tearing and drainage from eyes with plan for surgical correction.            11/2/2023    11:04 AM   Preop Questions   1. Have you ever had a heart attack or stroke? No   2. Have you ever had surgery on your heart or blood vessels, such as a stent placement, a coronary artery bypass, or surgery on an artery in your head, neck, heart, or legs? No   3. Do you have chest pain with activity? No   4. Do you have a history of  heart failure? No   5. Do you currently have a cold, bronchitis or symptoms of other infection? No   6. Do you have a cough, shortness of breath, or wheezing? No   7. Do you or anyone in  your family have previous history of blood clots? No   8. Do you or does anyone in your family have a serious bleeding problem such as prolonged bleeding following surgeries or cuts? No   9. Have you ever had problems with anemia or been told to take iron pills? YES -  20 years ago.    10. Have you had any abnormal blood loss such as black, tarry or bloody stools, or abnormal vaginal bleeding? No   11. Have you ever had a blood transfusion? No   12. Are you willing to have a blood transfusion if it is medically needed before, during, or after your surgery? Yes   13. Have you or any of your relatives ever had problems with anesthesia? No   14. Do you have sleep apnea, excessive snoring or daytime drowsiness? UNKNOWN - no excessive daytime sleepiness   15. Do you have any artifical heart valves or other implanted medical devices like a pacemaker, defibrillator, or continuous glucose monitor? No   16. Do you have artificial joints? No   17. Are you allergic to latex? No     Health Care Directive:  Patient does not have a Health Care Directive or Living Will: Patient states has Advance Directive and will bring in a copy to clinic.    Preoperative Review of :   reviewed - no record of controlled substances prescribed.      Status of Chronic Conditions:  See problem list for active medical problems.  Problems all longstanding and stable, except as noted/documented.  See ROS for pertinent symptoms related to these conditions.      Review of Systems  CONSTITUTIONAL: NEGATIVE for fever, chills, change in weight  INTEGUMENTARY/SKIN: NEGATIVE for worrisome rashes, moles or lesions  ENT/MOUTH: NEGATIVE for ear, mouth and throat problems  RESP: NEGATIVE for significant cough or SOB  CV: NEGATIVE for chest pain, palpitations or peripheral edema  GI: NEGATIVE for nausea, abdominal pain, heartburn, or change in bowel habits  : NEGATIVE for frequency, dysuria, or hematuria  MUSCULOSKELETAL: NEGATIVE for significant  arthralgias or myalgia  NEURO: NEGATIVE for weakness, dizziness or paresthesias  ENDOCRINE: NEGATIVE for temperature intolerance, skin/hair changes  HEME: NEGATIVE for bleeding problems  PSYCHIATRIC: NEGATIVE for changes in mood or affect    Patient Active Problem List    Diagnosis Date Noted    Morbid obesity (H) 08/17/2017     Priority: Medium     Comorbid conditions:  HTN, hyperlipidemia,       Constipation, unspecified constipation type 09/10/2016     Priority: Medium    Essential hypertension with goal blood pressure less than 140/90 08/26/2016     Priority: Medium    Elevated TSH 09/02/2015     Priority: Medium    Candidal intertrigo 11/25/2014     Priority: Medium    Osteopenia 08/05/2013     Priority: Medium    Vitamin D deficiency 01/19/2012     Priority: Medium    Watery eyes 08/30/2011     Priority: Medium    Advanced directives, counseling/discussion 07/12/2011     Priority: Medium     Advance Directive Problem List Overview:   Name Relationship Phone    Primary Health Care Agent            Alternative Health Care Agent          Discussed advance care planning with patient; information given to patient to review. 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 yes        Iron deficiency anemia      Priority: Medium     Diagnosis updated by automated process. Provider to review and confirm.      Hyperlipidemia LDL goal <100      Priority: Medium     [place] 10-year CHD Risk Score: 11% (20 Total Points)   Values used to calculate score:     Age: 67 years -- Points: 12     Total  Cholesterol: 173 mg/dL -- Points: 1     HDL Cholesterol: 51 mg/dL -- Points: 0     Systolic BP (treated): 142 mmHg -- Points: 5     The patient is not a smoker. -- Points: 0     The patient has not been diagnosed with diabetes. -- Points: 0     The patient has a family history of CHD. -- Points: 2       Allergic state      Priority: Medium     Dr. Murillo  (Problem list name updated by automated process. Provider to review and confirm.)      Precancerous skin lesion      Priority: Medium      Past Medical History:   Diagnosis Date    Allergy     Dr. Murillo    Ankle fracture     left has pins    Arm pain     ortho    Atypical chest pain     Dr. jeff ferguson [facility]    [place]     Heart murmur     Hyperlipidemia LDL goal < 100     vytorin    Hypertension     had stress test with cardiology ?2002    SADIE (iron deficiency anaemia)     Malignant neoplasm (H)     breast cancer    MEDICAL HISTORY OF -     ?leaky valve use to get SBE prophylaxsis    Precancerous skin lesion     Travel      Past Surgical History:   Procedure Laterality Date    BIOPSY  11/18/2010    left breast cancer    BREAST SURGERY      lumpectomy    COLONOSCOPY  5/24/2013    ORTHOPEDIC SURGERY  1995    left ankle, fibula    REPAIR ECTROPION BILATERAL  8/15/12    REPAIR ECTROPION BILATERAL  7/17/2013    Procedure: REPAIR ECTROPION BILATERAL;  BILATERAL LOWER EYELID ECTROPION REPAIR;  Surgeon: Neel Latham MD;  Location: [facility]    SURGICAL HISTORY OF -       left ankle pins     Current Outpatient Medications   Medication Sig Dispense Refill    aspirin 81 MG tablet Take  by mouth daily. *      cetirizine (RA CETIRIZINE) 10 MG tablet Take 10 mg by mouth Once Daily.      Cholecalciferol (VITAMIN D3) 2000 UNIT CAPS Take  by mouth daily.      coenzyme Q10 (CO-Q10) 30 MG capsule Take 1 capsule (30 mg) by mouth daily      fluticasone (FLONASE) 50 MCG/ACT nasal spray Spray 1-2 sprays into both nostrils daily      losartan (COZAAR) 100 MG tablet Take 1  "tablet (100 mg) by mouth daily 90 tablet 1    Lutein 15-0.7 MG CAPS Take  by mouth.      nystatin (MYCOSTATIN) 562722 UNIT/GM external powder apply powder liberally to groin and under breasts three times daily with rash and once daily for prevention 60 g 4    simvastatin (ZOCOR) 40 MG tablet Take 1 tablet (40 mg) by mouth At Bedtime 90 tablet 3    vitamin  B complex with vitamin C (VITAMIN  B COMPLEX) TABS Take 1 Cap by mouth daily.      VITAMIN C 500 MG PO CHEW 1 TABLET DAILY         Allergies   Allergen Reactions    Animal Dander Other (See Comments)     Cats-gets watery eyes    Dust Mites     Grass Unknown    Pollen Extract Unknown    Seasonal Allergies Nausea     Congested   Pollen tree grass cats  Had shots in past        Social History     Tobacco Use    Smoking status: Never    Smokeless tobacco: Never   Substance Use Topics    Alcohol use: Yes     Comment: 1-2  glasses of wine a week     Family History   Problem Relation Age of Onset    C.A.D. Father     Asthma Other     Breast Cancer Other         maternal aunt, paternal GM, aunt paternal    Diabetes Other         Uncle maternal    Breast Cancer Other     Asthma Other     Osteoporosis Maternal Grandmother     Ovarian Cancer Cousin     Diabetes Maternal Uncle     Cancer - colorectal No family hx of     Anesthesia Reaction No family hx of      History   Drug Use No         Objective     BP (!) 146/80 (BP Location: Right arm, Patient Position: Sitting, Cuff Size: Adult Large)   Pulse 66   Temp 96.8  F (36  C) (Temporal)   Resp 18   Ht 1.416 m (4' 7.75\")   Wt 79.2 kg (174 lb 9.6 oz)   LMP  (LMP Unknown)   SpO2 97%   BMI 39.50 kg/m      Physical Exam    GENERAL APPEARANCE: alert, active, no distress, and obese     EYES: EOMI, PERRL     HENT: ear canals and TM's normal and nose and mouth without ulcers or lesions     NECK: no adenopathy, no asymmetry, masses, or scars and thyroid normal to palpation     RESP: lungs clear to auscultation - no rales, " rhonchi or wheezes     CV: regular rates and rhythm, normal S1 S2, no S3 or S4 and no murmur, click or rub     ABDOMEN:  soft, nontender, no HSM or masses and bowel sounds normal     MS: extremities normal- no gross deformities noted, no evidence of inflammation in joints, FROM in all extremities.     SKIN: no suspicious lesions or rashes     NEURO: Normal strength and tone, sensory exam grossly normal, mentation intact and speech normal     PSYCH: mentation appears normal. and affect normal/bright     LYMPHATICS: No cervical adenopathy    Recent Labs   Lab Test 05/08/23  1400   HGB 13.7         POTASSIUM 4.4   CR 0.81        Diagnostics:  Recent Results (from the past 168 hour(s))   Renal panel (Alb, BUN, Ca, Cl, CO2, Creat, Gluc, Phos, K, Na)    Collection Time: 11/02/23 12:18 PM   Result Value Ref Range    Sodium 137 135 - 145 mmol/L    Potassium 4.7 3.4 - 5.3 mmol/L    Chloride 104 98 - 107 mmol/L    Carbon Dioxide (CO2) 22 22 - 29 mmol/L    Anion Gap 11 7 - 15 mmol/L    Glucose 106 (H) 70 - 99 mg/dL    Urea Nitrogen 14.6 8.0 - 23.0 mg/dL    Creatinine 0.78 0.51 - 0.95 mg/dL    GFR Estimate 78 >60 mL/min/1.73m2    Calcium 11.3 (H) 8.8 - 10.2 mg/dL    Albumin 4.6 3.5 - 5.2 g/dL    Phosphorus 2.2 (L) 2.5 - 4.5 mg/dL   Ionized Calcium    Collection Time: 11/02/23 12:18 PM   Result Value Ref Range    Calcium Ionized Whole Blood 5.6 (H) 4.4 - 5.2 mg/dL   Parathyroid Hormone Intact    Collection Time: 11/02/23 12:18 PM   Result Value Ref Range    Parathyroid Hormone Intact 85 (H) 15 - 65 pg/mL   Vitamin D Deficiency    Collection Time: 11/02/23 12:18 PM   Result Value Ref Range    Vitamin D, Total (25-Hydroxy) 34 20 - 50 ng/mL      No EKG required for low risk surgery (cataract, skin procedure, breast biopsy, etc).    Revised Cardiac Risk Index (RCRI):  The patient has the following serious cardiovascular risks for perioperative complications:   - No serious cardiac risks = 0 points     RCRI  Interpretation: 0 points: Class I (very low risk - 0.4% complication rate)         Signed Electronically by: Heather Adams MD  Copy of this evaluation report is provided to requesting physician.    Prior to immunization administration, verified patients identity using patient s name and date of birth. Please see Immunization Activity for additional information.     Screening Questionnaire for Adult Immunization    Are you sick today?   No   Do you have allergies to medications, food, a vaccine component or latex?   No   Have you ever had a serious reaction after receiving a vaccination?   No   Do you have a long-term health problem with heart, lung, kidney, or metabolic disease (e.g., diabetes), asthma, a blood disorder, no spleen, complement component deficiency, a cochlear implant, or a spinal fluid leak?  Are you on long-term aspirin therapy?   No   Do you have cancer, leukemia, HIV/AIDS, or any other immune system problem?   No   Do you have a parent, brother, or sister with an immune system problem?   No   In the past 3 months, have you taken medications that affect  your immune system, such as prednisone, other steroids, or anticancer drugs; drugs for the treatment of rheumatoid arthritis, Crohn s disease, or psoriasis; or have you had radiation treatments?   No   Have you had a seizure, or a brain or other nervous system problem?   No   During the past year, have you received a transfusion of blood or blood    products, or been given immune (gamma) globulin or antiviral drug?   No   For women: Are you pregnant or is there a chance you could become       pregnant during the next month?   No   Have you received any vaccinations in the past 4 weeks?   No     Immunization questionnaire answers were all negative.      Patient instructed to remain in clinic for 15 minutes afterwards, and to report any adverse reactions.     Screening performed by Leslye Alaniz MA on 11/2/2023 at 11:18 AM.

## 2023-11-02 NOTE — PATIENT INSTRUCTIONS
Labs today.   Flu vaccine today.      How to Take Your Medication Before Surgery  - Take all of your medications before surgery except as noted below  - HOLD (do not take) Aspirin for 7 days prior to surgery.        Preparing for Your Surgery  Getting started  A nurse will call you to review your health history and instructions. They will give you an arrival time based on your scheduled surgery time. Please be ready to share:  Your doctor's clinic name and phone number  Your medical, surgical, and anesthesia history  A list of allergies and sensitivities  A list of medicines, including herbal treatments and over-the-counter drugs  Whether the patient has a legal guardian (ask how to send us the papers in advance)  Please tell us if you're pregnant--or if there's any chance you might be pregnant. Some surgeries may injure a fetus (unborn baby), so they require a pregnancy test. Surgeries that are safe for a fetus don't always need a test, and you can choose whether to have one.   If you have a child who's having surgery, please ask for a copy of Preparing for Your Child's Surgery.    Preparing for surgery  Within 10 to 30 days of surgery: Have a pre-op exam (sometimes called an H&P, or History and Physical). This can be done at a clinic or pre-operative center.  If you're having a , you may not need this exam. Talk to your care team.  At your pre-op exam, talk to your care team about all medicines you take. If you need to stop any medicines before surgery, ask when to start taking them again.  We do this for your safety. Many medicines can make you bleed too much during surgery. Some change how well surgery (anesthesia) drugs work.  Call your insurance company to let them know you're having surgery. (If you don't have insurance, call [phone].)  Call your clinic if there's any change in your health. This includes signs of a cold or flu (sore throat, runny nose, cough, rash, fever). It also includes a  scrape or scratch near the surgery site.  If you have questions on the day of surgery, call your hospital or surgery center.  Eating and drinking guidelines  For your safety: Unless your surgeon tells you otherwise, follow the guidelines below.  Eat and drink as usual until 8 hours before you arrive for surgery. After that, no food or milk.  Drink clear liquids until 2 hours before you arrive. These are liquids you can see through, like water, Gatorade, and Propel Water. They also include plain black coffee and tea (no cream or milk), candy, and breath mints. You can spit out gum when you arrive.  If you drink alcohol: Stop drinking it the night before surgery.  If your care team tells you to take medicine on the morning of surgery, it's okay to take it with a sip of water.  Preventing infection  Shower or bathe the night before and morning of your surgery. Follow the instructions your clinic gave you. (If no instructions, use regular soap.)  Don't shave or clip hair near your surgery site. We'll remove the hair if needed.  Don't smoke or vape the morning of surgery. You may chew nicotine gum up to 2 hours before surgery. A nicotine patch is okay.  Note: Some surgeries require you to completely quit smoking and nicotine. Check with your surgeon.  Your care team will make every effort to keep you safe from infection. We will:  Clean our hands often with soap and water (or an alcohol-based hand rub).  Clean the skin at your surgery site with a special soap that kills germs.  Give you a special gown to keep you warm. (Cold raises the risk of infection.)  Wear special hair covers, masks, gowns and gloves during surgery.  Give antibiotic medicine, if prescribed. Not all surgeries need antibiotics.  What to bring on the day of surgery  Photo ID and insurance card  Copy of your health care directive, if you have one  Glasses and hearing aids (bring cases)  You can't wear contacts during surgery  Inhaler and eye drops, if  you use them (tell us about these when you arrive)  CPAP machine or breathing device, if you use them  A few personal items, if spending the night  If you have . . .  A pacemaker, ICD (cardiac defibrillator) or other implant: Bring the ID card.  An implanted stimulator: Bring the remote control.  A legal guardian: Bring a copy of the certified (court-stamped) guardianship papers.  Please remove any jewelry, including body piercings. Leave jewelry and other valuables at home.  If you're going home the day of surgery  You must have a responsible adult drive you home. They should stay with you overnight as well.  If you don't have someone to stay with you, and you aren't safe to go home alone, we may keep you overnight. Insurance often won't pay for this.  After surgery  If it's hard to control your pain or you need more pain medicine, please call your surgeon's office.  Questions?   If you have any questions for your care team, list them here: _________________________________________________________________________________________________________________________________________________________________________ ____________________________________ ____________________________________ ____________________________________  For informational purposes only. Not to replace the advice of your health care provider. Copyright   2003, 2019 [facility]. All rights reserved. Clinically reviewed by Cat Coleman MD. [employer] [id number] - REV 12/22.

## 2024-03-11 ENCOUNTER — ANCILLARY PROCEDURE (OUTPATIENT)
Dept: BONE DENSITY | Facility: CLINIC | Age: 79
End: 2024-03-11
Attending: FAMILY MEDICINE
Payer: COMMERCIAL

## 2024-03-11 DIAGNOSIS — E21.3 HYPERPARATHYROIDISM (H): ICD-10-CM

## 2024-03-11 DIAGNOSIS — E83.52 HYPERCALCEMIA: ICD-10-CM

## 2024-03-11 PROCEDURE — 77080 DXA BONE DENSITY AXIAL: CPT | Performed by: RADIOLOGY

## 2024-03-11 PROCEDURE — 77081 DXA BONE DENSITY APPENDICULR: CPT | Mod: XU | Performed by: RADIOLOGY

## 2024-03-21 DIAGNOSIS — I10 HYPERTENSION GOAL BP (BLOOD PRESSURE) < 140/90: ICD-10-CM

## 2024-03-21 RX ORDER — LOSARTAN POTASSIUM 100 MG/1
100 TABLET ORAL DAILY
Qty: 90 TABLET | Refills: 1 | Status: SHIPPED | OUTPATIENT
Start: 2024-03-21 | End: 2024-09-16

## 2024-04-26 DIAGNOSIS — B37.2 CANDIDAL INTERTRIGO: ICD-10-CM

## 2024-04-26 RX ORDER — NYSTATIN 100000 [USP'U]/G
POWDER TOPICAL
Qty: 60 G | Refills: 4 | Status: SHIPPED | OUTPATIENT
Start: 2024-04-26 | End: 2024-05-16

## 2024-05-13 DIAGNOSIS — E78.5 HYPERLIPIDEMIA LDL GOAL <100: ICD-10-CM

## 2024-05-14 RX ORDER — SIMVASTATIN 40 MG
40 TABLET ORAL AT BEDTIME
Qty: 90 TABLET | Refills: 0 | Status: SHIPPED | OUTPATIENT
Start: 2024-05-14 | End: 2024-08-13

## 2024-05-15 SDOH — HEALTH STABILITY: PHYSICAL HEALTH: ON AVERAGE, HOW MANY DAYS PER WEEK DO YOU ENGAGE IN MODERATE TO STRENUOUS EXERCISE (LIKE A BRISK WALK)?: 2 DAYS

## 2024-05-15 SDOH — HEALTH STABILITY: PHYSICAL HEALTH: ON AVERAGE, HOW MANY MINUTES DO YOU ENGAGE IN EXERCISE AT THIS LEVEL?: 40 MIN

## 2024-05-15 ASSESSMENT — SOCIAL DETERMINANTS OF HEALTH (SDOH): HOW OFTEN DO YOU GET TOGETHER WITH FRIENDS OR RELATIVES?: PATIENT DECLINED

## 2024-05-16 ENCOUNTER — OFFICE VISIT (OUTPATIENT)
Dept: FAMILY MEDICINE | Facility: CLINIC | Age: 79
End: 2024-05-16
Attending: FAMILY MEDICINE
Payer: COMMERCIAL

## 2024-05-16 VITALS
WEIGHT: 174.9 LBS | SYSTOLIC BLOOD PRESSURE: 165 MMHG | TEMPERATURE: 97.4 F | OXYGEN SATURATION: 98 % | HEART RATE: 76 BPM | HEIGHT: 56 IN | DIASTOLIC BLOOD PRESSURE: 79 MMHG | BODY MASS INDEX: 39.34 KG/M2 | RESPIRATION RATE: 17 BRPM

## 2024-05-16 DIAGNOSIS — E78.5 HYPERLIPIDEMIA LDL GOAL <100: ICD-10-CM

## 2024-05-16 DIAGNOSIS — I10 HYPERTENSION GOAL BP (BLOOD PRESSURE) < 140/90: ICD-10-CM

## 2024-05-16 DIAGNOSIS — B37.2 CANDIDAL INTERTRIGO: ICD-10-CM

## 2024-05-16 DIAGNOSIS — D50.9 IRON DEFICIENCY ANEMIA, UNSPECIFIED IRON DEFICIENCY ANEMIA TYPE: ICD-10-CM

## 2024-05-16 DIAGNOSIS — R79.89 ELEVATED TSH: ICD-10-CM

## 2024-05-16 DIAGNOSIS — Z00.00 ENCOUNTER FOR MEDICARE ANNUAL WELLNESS EXAM: Primary | ICD-10-CM

## 2024-05-16 DIAGNOSIS — E21.3 HYPERPARATHYROIDISM (H): ICD-10-CM

## 2024-05-16 DIAGNOSIS — C50.919 INFILTRATING DUCTAL CARCINOMA OF BREAST, STAGE 1, UNSPECIFIED LATERALITY (H): ICD-10-CM

## 2024-05-16 DIAGNOSIS — Z23 COVID-19 VACCINE ADMINISTERED: ICD-10-CM

## 2024-05-16 DIAGNOSIS — E66.01 MORBID OBESITY (H): ICD-10-CM

## 2024-05-16 LAB
CA-I BLD-MCNC: 5.6 MG/DL (ref 4.4–5.2)
ERYTHROCYTE [DISTWIDTH] IN BLOOD BY AUTOMATED COUNT: 13.5 % (ref 10–15)
HCT VFR BLD AUTO: 41.1 % (ref 35–47)
HGB BLD-MCNC: 13.8 G/DL (ref 11.7–15.7)
MCH RBC QN AUTO: 28.9 PG (ref 26.5–33)
MCHC RBC AUTO-ENTMCNC: 33.6 G/DL (ref 31.5–36.5)
MCV RBC AUTO: 86 FL (ref 78–100)
PLATELET # BLD AUTO: 233 10E3/UL (ref 150–450)
RBC # BLD AUTO: 4.78 10E6/UL (ref 3.8–5.2)
WBC # BLD AUTO: 7.5 10E3/UL (ref 4–11)

## 2024-05-16 PROCEDURE — 82330 ASSAY OF CALCIUM: CPT | Performed by: FAMILY MEDICINE

## 2024-05-16 PROCEDURE — 85027 COMPLETE CBC AUTOMATED: CPT | Performed by: FAMILY MEDICINE

## 2024-05-16 PROCEDURE — 99214 OFFICE O/P EST MOD 30 MIN: CPT | Mod: 25 | Performed by: FAMILY MEDICINE

## 2024-05-16 PROCEDURE — G0439 PPPS, SUBSEQ VISIT: HCPCS | Performed by: FAMILY MEDICINE

## 2024-05-16 PROCEDURE — 91320 SARSCV2 VAC 30MCG TRS-SUC IM: CPT | Performed by: FAMILY MEDICINE

## 2024-05-16 PROCEDURE — 36415 COLL VENOUS BLD VENIPUNCTURE: CPT | Performed by: FAMILY MEDICINE

## 2024-05-16 PROCEDURE — 84443 ASSAY THYROID STIM HORMONE: CPT | Performed by: FAMILY MEDICINE

## 2024-05-16 PROCEDURE — 83970 ASSAY OF PARATHORMONE: CPT | Performed by: FAMILY MEDICINE

## 2024-05-16 PROCEDURE — 90480 ADMN SARSCOV2 VAC 1/ONLY CMP: CPT | Performed by: FAMILY MEDICINE

## 2024-05-16 PROCEDURE — 82570 ASSAY OF URINE CREATININE: CPT | Performed by: FAMILY MEDICINE

## 2024-05-16 PROCEDURE — 80053 COMPREHEN METABOLIC PANEL: CPT | Performed by: FAMILY MEDICINE

## 2024-05-16 PROCEDURE — 80061 LIPID PANEL: CPT | Performed by: FAMILY MEDICINE

## 2024-05-16 PROCEDURE — 82043 UR ALBUMIN QUANTITATIVE: CPT | Performed by: FAMILY MEDICINE

## 2024-05-16 RX ORDER — NYSTATIN 100000 [USP'U]/G
POWDER TOPICAL
Qty: 60 G | Refills: 8 | Status: SHIPPED | OUTPATIENT
Start: 2024-05-16

## 2024-05-16 NOTE — PATIENT INSTRUCTIONS
"Lab testing today.  Refills will be updated when results return.    Monitor BP at home daily and send readings in 1 week.    For the \"phlegmy\" sense in throat when laying down, assess whether you are more congested at the time or if there are more allergy related.      Mammogram recommended in July/August.    COVID booster today.    Preventive Care Advice   This is general advice we often give to help people stay healthy. Your care team may have specific advice just for you. Please talk to your care team about your own preventive care needs.  Lifestyle  Exercise at least 150 minutes each week (30 minutes a day, 5 days a week).  Do muscle strengthening activities 2 days a week. These help control your weight and prevent disease.  No smoking.  Wear sunscreen to prevent skin cancer.  Have your home tested for radon every 2 to 5 years. Radon is a colorless, odorless gas that can harm your lungs. To learn more, go to www.health.[facility].[place].us and search for \"Radon in Homes.\"  Keep guns unloaded and locked up in a safe place like a safe or gun vault, or, use a gun lock and hide the keys. Always lock away bullets separately. To learn more, visit [employer].[place].gov and search for \"safe gun storage.\"  Nutrition  Eat 5 or more servings of fruits and vegetables each day.  Try wheat bread, brown rice and whole grain pasta (instead of white bread, rice, and pasta).  Get enough calcium and vitamin D. Check the label on foods and aim for 100% of the RDA (recommended daily allowance).  Regular exams  Have a dental exam and cleaning every 6 months.  See your health care team every year to talk about:  Any changes in your health.  Any medicines your care team has prescribed.  Preventive care, family planning, and ways to prevent chronic diseases.  Shots (vaccines)   HPV shots (up to age 26), if you've never had them before.  Hepatitis B shots (up to age 59), if you've never had them before.  COVID-19 shot: Get this shot when it's due.  Flu " shot: Get a flu shot every year.  Tetanus shot: Get a tetanus shot every 10 years.  Pneumococcal, hepatitis A, and RSV shots: Ask your care team if you need these based on your risk.  Shingles shot (for age 50 and up).  General health tests  Diabetes screening:  Starting at age 35, Get screened for diabetes at least every 3 years.  If you are younger than age 35, ask your care team if you should be screened for diabetes.  Cholesterol test: At age 39, start having a cholesterol test every 5 years, or more often if advised.  Bone density scan (DEXA): At age 50, ask your care team if you should have this scan for osteoporosis (brittle bones).  Hepatitis C: Get tested at least once in your life.  Abdominal aortic aneurysm screening: Talk to your doctor about having this screening if you:  Have ever smoked; and  Are biologically male; and  Are between the ages of 65 and 75.  STIs (sexually transmitted infections)  Before age 24: Ask your care team if you should be screened for STIs.  After age 24: Get screened for STIs if you're at risk. You are at risk for STIs (including HIV) if:  You are sexually active with more than one person.  You don't use condoms every time.  You or a partner was diagnosed with a sexually transmitted infection.  If you are at risk for HIV, ask about PrEP medicine to prevent HIV.  Get tested for HIV at least once in your life, whether you are at risk for HIV or not.  Cancer screening tests  Cervical cancer screening: If you have a cervix, begin getting regular cervical cancer screening tests at age 21. Most people who have regular screenings with normal results can stop after age 65. Talk about this with your provider.  Breast cancer scan (mammogram): If you've ever had breasts, begin having regular mammograms starting at age 40. This is a scan to check for breast cancer.  Colon cancer screening: It is important to start screening for colon cancer at age 45.  Have a colonoscopy test every 10  years (or more often if you're at risk) Or, ask your provider about stool tests like a FIT test every year or Cologuard test every 3 years.  To learn more about your testing options, visit: [email or web address]/517937.pdf.  For help making a decision, visit: dinah/cc52070.  Prostate cancer screening test: If you have a prostate and are age 55 to 69, ask your provider if you would benefit from a yearly prostate cancer screening test.  Lung cancer screening: If you are a current or former smoker age 50 to 80, ask your care team if ongoing lung cancer screenings are right for you.  For informational purposes only. Not to replace the advice of your health care provider. Copyright   2023 [employer]. All rights reserved. Clinically reviewed by the [facility]. [employer] Style.com 666634 - REV 04/24.    Hearing Loss: Care Instructions  Overview     Hearing loss is a sudden or slow decrease in how well you hear. It can range from slight to profound. Permanent hearing loss can occur with aging. It also can happen when you are exposed long-term to loud noise. Examples include listening to loud music, riding motorcycles, or being around other loud machines.  Hearing loss can affect your work and home life. It can make you feel lonely or depressed. You may feel that you have lost your independence. But hearing aids and other devices can help you hear better and feel connected to others.  Follow-up care is a key part of your treatment and safety. Be sure to make and go to all appointments, and call your doctor if you are having problems. It's also a good idea to know your test results and keep a list of the medicines you take.  How can you care for yourself at home?  Avoid loud noises whenever possible. This helps keep your hearing from getting worse.  Always wear hearing protection around loud noises.  Wear a hearing aid as directed.  A professional can help you pick a hearing aid that will work best  "for you.  You can also get hearing aids over the counter for mild to moderate hearing loss.  Have hearing tests as your doctor suggests. They can show whether your hearing has changed. Your hearing aid may need to be adjusted.  Use other devices as needed. These may include:  Telephone amplifiers and hearing aids that can connect to a television, stereo, radio, or microphone.  Devices that use lights or vibrations. These alert you to the doorbell, a ringing telephone, or a baby monitor.  Television closed-captioning. This shows the words at the bottom of the screen. Most new TVs can do this.  TTY (text telephone). This lets you type messages back and forth on the telephone instead of talking or listening. These devices are also called TDD. When messages are typed on the keyboard, they are sent over the phone line to a receiving TTY. The message is shown on a monitor.  Use text messaging, social media, and email if it is hard for you to communicate by telephone.  Try to learn a listening technique called speechreading. It is not lipreading. You pay attention to people's gestures, expressions, posture, and tone of voice. These clues can help you understand what a person is saying. Face the person you are talking to, and have them face you. Make sure the lighting is good. You need to see the other person's face clearly.  Think about counseling if you need help to adjust to your hearing loss.  When should you call for help?  Watch closely for changes in your health, and be sure to contact your doctor if:    You think your hearing is getting worse.     You have new symptoms, such as dizziness or nausea.   Where can you learn more?  Go to https://www.healthKeegy.net/patiented  Enter R798 in the search box to learn more about \"Hearing Loss: Care Instructions.\"  Current as of: September 27, 2023               Content Version: 14.0    7790-7318 [employer], [employer].   Care instructions adapted under license by your " healthcare professional. If you have questions about a medical condition or this instruction, always ask your healthcare professional. Healthwise, [facility] disclaims any warranty or liability for your use of this information.      Learning About Stress  What is stress?     Stress is your body's response to a hard situation. Your body can have a physical, emotional, or mental response. Stress is a fact of life for most people, and it affects everyone differently. What causes stress for you may not be stressful for someone else.  A lot of things can cause stress. You may feel stress when you go on a job interview, take a test, or run a race. This kind of short-term stress is normal and even useful. It can help you if you need to work hard or react quickly. For example, stress can help you finish an important job on time.  Long-term stress is caused by ongoing stressful situations or events. Examples of long-term stress include long-term health problems, ongoing problems at work, or conflicts in your family. Long-term stress can harm your health.  How does stress affect your health?  When you are stressed, your body responds as though you are in danger. It makes hormones that speed up your heart, make you breathe faster, and give you a burst of energy. This is called the fight-or-flight stress response. If the stress is over quickly, your body goes back to normal and no harm is done.  But if stress happens too often or lasts too long, it can have bad effects. Long-term stress can make you more likely to get sick, and it can make symptoms of some diseases worse. If you tense up when you are stressed, you may develop neck, shoulder, or low back pain. Stress is linked to high blood pressure and heart disease.  Stress also harms your emotional health. It can make you navarrete, tense, or depressed. Your relationships may suffer, and you may not do well at work or school.  What can you do to manage stress?  You can try  these things to help manage stress:   Do something active. Exercise or activity can help reduce stress. Walking is a great way to get started. Even everyday activities such as housecleaning or yard work can help.  Try yoga or teddy chi. These techniques combine exercise and meditation. You may need some training at first to learn them.  Do something you enjoy. For example, listen to music or go to a movie. Practice your hobby or do volunteer work.  Meditate. This can help you relax, because you are not worrying about what happened before or what may happen in the future.  Do guided imagery. Imagine yourself in any setting that helps you feel calm. You can use online videos, books, or a teacher to guide you.  Do breathing exercises. For example:  From a standing position, bend forward from the waist with your knees slightly bent. Let your arms dangle close to the floor.  Breathe in slowly and deeply as you return to a standing position. Roll up slowly and lift your head last.  Hold your breath for just a few seconds in the standing position.  Breathe out slowly and bend forward from the waist.  Let your feelings out. Talk, laugh, cry, and express anger when you need to. Talking with supportive friends or family, a counselor, or a kris leader about your feelings is a healthy way to relieve stress. Avoid discussing your feelings with people who make you feel worse.  Write. It may help to write about things that are bothering you. This helps you find out how much stress you feel and what is causing it. When you know this, you can find better ways to cope.  What can you do to prevent stress?  You might try some of these things to help prevent stress:  Manage your time. This helps you find time to do the things you want and need to do.  Get enough sleep. Your body recovers from the stresses of the day while you are sleeping.  Get support. Your family, friends, and community can make a difference in how you experience  "stress.  Limit your news feed. Avoid or limit time on social media or news that may make you feel stressed.  Do something active. Exercise or activity can help reduce stress. Walking is a great way to get started.  Where can you learn more?  Go to https://www.[employer].net/patiented  Enter N032 in the search box to learn more about \"Learning About Stress.\"  Current as of: October 24, 2023               Content Version: 14.0    3849-5611 [employer].   Care instructions adapted under license by your healthcare professional. If you have questions about a medical condition or this instruction, always ask your healthcare professional. [employer] disclaims any warranty or liability for your use of this information.      Bladder Training: Care Instructions  Your Care Instructions     Bladder training is used to treat urge incontinence and stress incontinence. Urge incontinence means that the need to urinate comes on so fast that you can't get to a toilet in time. Stress incontinence means that you leak urine because of pressure on your bladder. For example, it may happen when you laugh, cough, or lift something heavy.  Bladder training can increase how long you can wait before you have to urinate. It can also help your bladder hold more urine. And it can give you better control over the urge to urinate.  It is important to remember that bladder training takes a few weeks to a few months to make a difference. You may not see results right away, but don't give up.  Follow-up care is a key part of your treatment and safety. Be sure to make and go to all appointments, and call your doctor if you are having problems. It's also a good idea to know your test results and keep a list of the medicines you take.  How can you care for yourself at home?  Work with your doctor to come up with a bladder training program that is right for you. You may use one or more of the following methods.  Delayed " "urination  In the beginning, try to keep from urinating for 5 minutes after you first feel the need to go.  While you wait, take deep, slow breaths to relax. Kegel exercises can also help you delay the need to go to the bathroom.  After some practice, when you can easily wait 5 minutes to urinate, try to wait 10 minutes before you urinate.  Slowly increase the waiting period until you are able to control when you have to urinate.  Scheduled urination  Empty your bladder when you first wake up in the morning.  Schedule times throughout the day when you will urinate.  Start by going to the bathroom every hour, even if you don't need to go.  Slowly increase the time between trips to the bathroom.  When you have found a schedule that works well for you, keep doing it.  If you wake up during the night and have to urinate, do it. Apply your schedule to waking hours only.  Kegel exercises  These tighten and strengthen pelvic muscles, which can help you control the flow of urine. (If doing these exercises causes pain, stop doing them and talk with your doctor.) To do Kegel exercises:  Squeeze your muscles as if you were trying not to pass gas. Or squeeze your muscles as if you were stopping the flow of urine. Your belly, legs, and buttocks shouldn't move.  Hold the squeeze for 3 seconds, then relax for 5 to 10 seconds.  Start with 3 seconds, then add 1 second each week until you are able to squeeze for 10 seconds.  Repeat the exercise 10 times a session. Do 3 to 8 sessions a day.  When should you call for help?  Watch closely for changes in your health, and be sure to contact your doctor if:    Your incontinence is getting worse.     You do not get better as expected.   Where can you learn more?  Go to https://www.F[employer].net/patiented  Enter V684 in the search box to learn more about \"Bladder Training: Care Instructions.\"  Current as of: November 15, 2023               Content Version: 14.0    1542-7226 Healthwise, " Incorporated.   Care instructions adapted under license by your healthcare professional. If you have questions about a medical condition or this instruction, always ask your healthcare professional. Healthwise, [employer] disclaims any warranty or liability for your use of this information.

## 2024-05-16 NOTE — PROGRESS NOTES
Preventive Care Visit  [facility]  Heather Adams MD, Family Medicine  May 16, 2024      Assessment & Plan     Encounter for Medicare annual wellness exam  Screening and preventive care discussed.    - PRIMARY CARE FOLLOW-UP SCHEDULING    Hyperlipidemia LDL goal <100  Lipid testing today.  Currently taking simvastatin and refill will be available once results return.  - Lipid panel reflex to direct LDL Non-fasting; Future  - Lipid panel reflex to direct LDL Non-fasting    Candidal intertrigo  Topical application of nystatin in the groin and beneath the breast as needed.  Reports this is controlling symptoms well.  - nystatin (NYSTOP) 024260 UNIT/GM external powder; APPLY POWDER LIBERALLY TO GROIN AND UNDER BREASTS THREE TIMES DAILY WITH RASH AND ONCE DAILY FOR PREVENTION    Hyperparathyroidism (H24)  Has appointment scheduled with endocrinology.  Reassessment of calcium and parathyroid level is done today.  Awaiting results.  - Comprehensive metabolic panel (BMP + Alb, Alk Phos, ALT, AST, Total. Bili, TP); Future  - TSH with free T4 reflex; Future  - Ionized Calcium; Future  - Parathyroid Hormone Intact; Future  - Comprehensive metabolic panel (BMP + Alb, Alk Phos, ALT, AST, Total. Bili, TP)  - TSH with free T4 reflex  - Ionized Calcium  - Parathyroid Hormone Intact    Hypertension goal BP (blood pressure) < 140/90  Blood pressure elevated today.  Continues on losartan as previous.  Blood pressures at home she reports have been normal.  Discussed potentially adjusting medication with addition of either amlodipine or hydrochlorothiazide.  Await lab results for further assistance in decision-making as well as home monitoring of blood pressure the results of which she will send to me in few days.  - TSH with free T4 reflex; Future  - Albumin Random Urine Quantitative with Creat Ratio; Future  - TSH with free T4 reflex  - Albumin Random Urine Quantitative with Creat Ratio    Elevated  "TSH  Reassess thyroid function  - TSH with free T4 reflex; Future  - TSH with free T4 reflex    Iron deficiency anemia, unspecified iron deficiency anemia type  Past history of iron deficiency.  Current blood cell counts normal.  - CBC with platelets; Future  - CBC with platelets    COVID-vaccine administered  Last COVID-vaccine was over 6 months ago.  She is aware of the recommendation for repeat vaccination is greater than 65.  Booster given today.    Morbid obesity  Wt Readings from Last 5 Encounters:   05/16/24 79.3 kg (174 lb 14.4 oz)   11/02/23 79.2 kg (174 lb 9.6 oz)   05/08/23 79.4 kg (175 lb 1.6 oz)   09/03/20 78.9 kg (174 lb)   08/01/19 79.3 kg (174 lb 12.8 oz)   BMI greater than 35 with concomitant hypertension and hyperlipidemia.  Weight has been stable.  Healthy diet and exercise discussed.    Infiltrating ductal carcinoma stage I  Diagnosed in 2010.  Underwent lumpectomy and sentinel node biopsy with subsequent chemo, radiation, Arimidex therapy.  Yearly mammograms continue.  Oncologist recommended CBC, CMP, yearly breast exams and physical.    Patient has been advised of split billing requirements and indicates understanding: Yes        BMI  Estimated body mass index is 39.9 kg/m  as calculated from the following:    Height as of this encounter: 1.41 m (4' 7.51\").    Weight as of this encounter: 79.3 kg (174 lb 14.4 oz).   Weight management plan: Discussed healthy diet and exercise guidelines    Counseling  Appropriate preventive services were discussed with this patient, including applicable screening as appropriate for fall prevention, nutrition, physical activity, Tobacco-use cessation, weight loss and cognition.  Checklist reviewing preventive services available has been given to the patient.  Reviewed patient's diet, addressing concerns and/or questions.   She is at risk for lack of exercise and has been provided with information to increase physical activity for the benefit of her well-being. " "  The patient was provided with written information regarding signs of hearing loss.   Information on urinary incontinence and treatment options given to patient.       Patient Instructions   Lab testing today.  Refills will be updated when results return.    Monitor BP at home daily and send readings in 1 week.    For the \"phlegmy\" sense in throat when laying down, assess whether you are more congested at the time or if there are more allergy related.      Mammogram recommended in July/August.    COVID booster today.      Mahad Anderson is a 78 year old, presenting for the following:  Physical        5/16/2024     2:12 PM   Additional Questions   Roomed by [facility] Care Directive  Patient does not have a Health Care Directive or Living Will: Advance Directive received and scanned. Click on Code in the patient header to view.    HPI      Hyperlipidemia Follow-Up    Are you regularly taking any medication or supplement to lower your cholesterol?   Yes- simvastatin  Are you having muscle aches or other side effects that you think could be caused by your cholesterol lowering medication?  No    Hypertension Follow-up    Do you check your blood pressure regularly outside of the clinic? Yes   Are you following a low salt diet? No  Are your blood pressures ever more than 140 on the top number (systolic) OR more   than 90 on the bottom number (diastolic), for example 140/90? No        5/15/2024   General Health   How would you rate your overall physical health? (!) FAIR   Feel stress (tense, anxious, or unable to sleep) To some extent   (!) STRESS CONCERN      5/15/2024   Nutrition   Diet: Regular (no restrictions)         5/15/2024   Exercise   Days per week of moderate/strenous exercise 2 days   Average minutes spent exercising at this level 40 min   (!) EXERCISE CONCERN  - walking,  weight lifting      5/15/2024   Social Factors   Frequency of gathering with friends or relatives Patient declined   Worry food " won't last until get money to buy more No   Food not last or not have enough money for food? No   Do you have housing?  Yes   Are you worried about losing your housing? No   Lack of transportation? No   Unable to get utilities (heat,electricity)? No         5/16/2024   Fall Risk   Fallen 2 or more times in the past year? No   Trouble with walking or balance? No          5/15/2024   Activities of Daily Living- Home Safety   Needs help with the following daily activites None of the above   Safety concerns in the home None of the above         5/15/2024   Dental   Dentist two times every year? Yes         5/15/2024   Hearing Screening   Hearing concerns? (!) TROUBLE UNDERSTANDING SOFT OR WHISPERED SPEECH.   Has hearing aids.       5/15/2024   Driving Risk Screening   Patient/family members have concerns about driving No         5/15/2024   General Alertness/Fatigue Screening   Have you been more tired than usual lately? No         5/15/2024   Urinary Incontinence Screening   Bothered by leaking urine in past 6 months Yes   With travel.       5/15/2024   TB Screening   Were you born outside of the [place]? Yes         Today's PHQ-2 Score:       5/15/2024     9:05 PM   PHQ-2 ( 1999 Pfizer)   Q1: Little interest or pleasure in doing things 1   Q2: Feeling down, depressed or hopeless 0   PHQ-2 Score 1   Q1: Little interest or pleasure in doing things Several days   Q2: Feeling down, depressed or hopeless Not at all   PHQ-2 Score 1           5/15/2024   Substance Use   Alcohol more than 3/day or more than 7/wk No   Do you have a current opioid prescription? No   How severe/bad is pain from 1 to 10? 0/10 (No Pain)   Do you use any other substances recreationally? No     Social History     Tobacco Use    Smoking status: Never    Smokeless tobacco: Never   Vaping Use    Vaping status: Never Used   Substance Use Topics    Alcohol use: Yes     Comment: 1-2  glasses of wine a week    Drug use: No          Mammogram Screening -  Mammogram every 1-2 years updated in Health Maintenance based on mutual decision making    ASCVD Risk   The 10-year ASCVD risk score (Alvarado HYLTON, et al., 2019) is: 39%    Values used to calculate the score:      Age: 78 years      Sex: Female      Is Non- : No      Diabetic: No      Tobacco smoker: No      Systolic Blood Pressure: 165 mmHg      Is BP treated: Yes      HDL Cholesterol: 56 mg/dL      Total Cholesterol: 155 mg/dL          Reviewed and updated as needed this visit by Provider   Tobacco  Allergies  Meds   Med Hx  Surg Hx  Fam Hx            Past Medical History:   Diagnosis Date    Allergy     Dr. Murillo    Ankle fracture     left has pins    Arm pain     ortho    Atypical chest pain     Dr. jeff ferguson [facility]    [place]     Heart murmur     Hyperlipidemia LDL goal < 100     vytorin    Hypertension     had stress test with cardiology ?2002    SADIE (iron deficiency anaemia)     Malignant neoplasm (H)     breast cancer    MEDICAL HISTORY OF -     ?leaky valve use to get SBE prophylaxsis    Precancerous skin lesion     Travel      Past Surgical History:   Procedure Laterality Date    BIOPSY  11/18/2010    left breast cancer    BREAST SURGERY      lumpectomy    COLONOSCOPY  5/24/2013    ORTHOPEDIC SURGERY  1995    left ankle, fibula    REPAIR ECTROPION BILATERAL  8/15/12    REPAIR ECTROPION BILATERAL  7/17/2013    Procedure: REPAIR ECTROPION BILATERAL;  BILATERAL LOWER EYELID ECTROPION REPAIR;  Surgeon: Neel Latham MD;  Location: [facility]    SURGICAL HISTORY OF -       left ankle pins     OB History   No obstetric history on file.     BP Readings from Last 3 Encounters:   05/16/24 (!) 165/79   11/02/23 138/80   05/08/23 (!) 144/76    Wt Readings from Last 3 Encounters:   05/16/24 79.3 kg (174 lb 14.4 oz)   11/02/23 79.2 kg (174 lb 9.6 oz)   05/08/23 79.4 kg (175 lb 1.6 oz)                  Current providers sharing in care for this patient include:  Patient  "Care Team:  Heather Adams MD as PCP - General (Family Practice)  Heather Adams MD as Assigned PCP    The following health maintenance items are reviewed in Epic and correct as of today:  Health Maintenance   Topic Date Due    COVID-19 Vaccine (8 - 2023-24 season) 02/10/2024    LIPID  05/08/2024    ANNUAL REVIEW OF HM ORDERS  05/08/2024    MEDICARE ANNUAL WELLNESS VISIT  05/08/2024    MAMMO SCREENING  07/27/2024    FALL RISK ASSESSMENT  05/16/2025    GLUCOSE  11/02/2026    DTAP/TDAP/TD IMMUNIZATION (4 - Td or Tdap) 08/14/2027    ADVANCE CARE PLANNING  11/02/2028    DEXA  03/11/2039    HEPATITIS C SCREENING  Completed    PHQ-2 (once per calendar year)  Completed    INFLUENZA VACCINE  Completed    Pneumococcal Vaccine: 65+ Years  Completed    ZOSTER IMMUNIZATION  Completed    RSV VACCINE (Pregnancy & 60+)  Completed    IPV IMMUNIZATION  Aged Out    HPV IMMUNIZATION  Aged Out    MENINGITIS IMMUNIZATION  Aged Out    RSV MONOCLONAL ANTIBODY  Aged Out    COLORECTAL CANCER SCREENING  Discontinued         Review of Systems  Constitutional, HEENT, cardiovascular, pulmonary, GI, , musculoskeletal, neuro, skin, endocrine and psych systems are negative, except as otherwise noted.      SOB with going up hill.  Walking up to 1 mile without symptoms flat.        Objective    Exam  BP (!) 165/79 (BP Location: Right arm, Patient Position: Sitting)   Pulse 76   Temp 97.4  F (36.3  C) (Oral)   Resp 17   Ht 1.41 m (4' 7.51\")   Wt 79.3 kg (174 lb 14.4 oz)   LMP  (LMP Unknown)   SpO2 98%   BMI 39.90 kg/m     Estimated body mass index is 39.9 kg/m  as calculated from the following:    Height as of this encounter: 1.41 m (4' 7.51\").    Weight as of this encounter: 79.3 kg (174 lb 14.4 oz).    Physical Exam  GENERAL: alert and no distress  EYES: Eyes grossly normal to inspection, PERRL and conjunctivae and sclerae normal  HENT: ear canals and TM's normal, nose and mouth without ulcers or lesions  NECK: no adenopathy, no " asymmetry, masses, or scars  RESP: lungs clear to auscultation - no rales, rhonchi or wheezes  BREAST: normal without masses, tenderness or nipple discharge and no palpable axillary masses or adenopathy  CV: regular rate and rhythm, normal S1 S2, no S3 or S4, no murmur, click or rub, no peripheral edema  ABDOMEN: soft, nontender, no hepatosplenomegaly, no masses and bowel sounds normal   (female): normal urethral meatus , vaginal mucosal atrophy, and erythematous rash creases of the thighs/groin bilaterally.  MS: no gross musculoskeletal defects noted, no edema  SKIN: Erythematous mild scaling rash beneath breast bilaterally  NEURO: Normal strength and tone, mentation intact and speech normal  PSYCH: mentation appears normal, affect normal/bright        5/16/2024   Mini Cog   Clock Draw Score 2 Normal   3 Item Recall 3 objects recalled   Mini Cog Total Score 5              Signed Electronically by: Heather Adams MD

## 2024-05-16 NOTE — PROGRESS NOTES
Prior to immunization administration, verified patients identity using patient s name and date of birth. Please see Immunization Activity for additional information.     Screening Questionnaire for Adult Immunization    Are you sick today?   No   Do you have allergies to medications, food, a vaccine component or latex?   No   Have you ever had a serious reaction after receiving a vaccination?   No   Do you have a long-term health problem with heart, lung, kidney, or metabolic disease (e.g., diabetes), asthma, a blood disorder, no spleen, complement component deficiency, a cochlear implant, or a spinal fluid leak?  Are you on long-term aspirin therapy?   No   Do you have cancer, leukemia, HIV/AIDS, or any other immune system problem?   No   Do you have a parent, brother, or sister with an immune system problem?   No   In the past 3 months, have you taken medications that affect  your immune system, such as prednisone, other steroids, or anticancer drugs; drugs for the treatment of rheumatoid arthritis, Crohn s disease, or psoriasis; or have you had radiation treatments?   No   Have you had a seizure, or a brain or other nervous system problem?   No   During the past year, have you received a transfusion of blood or blood    products, or been given immune (gamma) globulin or antiviral drug?   No   For women: Are you pregnant or is there a chance you could become       pregnant during the next month?   No   Have you received any vaccinations in the past 4 weeks?   No     Immunization questionnaire answers were all negative.      Patient instructed to remain in clinic for 15 minutes afterwards, and to report any adverse reactions.     Screening performed by Miguelina Jacobson MA on 5/16/2024 at 3:08 PM.

## 2024-05-17 LAB
ALBUMIN SERPL BCG-MCNC: 4.6 G/DL (ref 3.5–5.2)
ALP SERPL-CCNC: 55 U/L (ref 40–150)
ALT SERPL W P-5'-P-CCNC: 28 U/L (ref 0–50)
ANION GAP SERPL CALCULATED.3IONS-SCNC: 12 MMOL/L (ref 7–15)
AST SERPL W P-5'-P-CCNC: 31 U/L (ref 0–45)
BILIRUB SERPL-MCNC: 0.5 MG/DL
BUN SERPL-MCNC: 13.6 MG/DL (ref 8–23)
CALCIUM SERPL-MCNC: 11 MG/DL (ref 8.8–10.2)
CHLORIDE SERPL-SCNC: 106 MMOL/L (ref 98–107)
CHOLEST SERPL-MCNC: 167 MG/DL
CREAT SERPL-MCNC: 0.88 MG/DL (ref 0.51–0.95)
CREAT UR-MCNC: 173 MG/DL
DEPRECATED HCO3 PLAS-SCNC: 20 MMOL/L (ref 22–29)
EGFRCR SERPLBLD CKD-EPI 2021: 67 ML/MIN/1.73M2
FASTING STATUS PATIENT QL REPORTED: NO
FASTING STATUS PATIENT QL REPORTED: NO
GLUCOSE SERPL-MCNC: 100 MG/DL (ref 70–99)
HDLC SERPL-MCNC: 52 MG/DL
LDLC SERPL CALC-MCNC: 83 MG/DL
MICROALBUMIN UR-MCNC: <12 MG/L
MICROALBUMIN/CREAT UR: NORMAL MG/G{CREAT}
NONHDLC SERPL-MCNC: 115 MG/DL
POTASSIUM SERPL-SCNC: 4.5 MMOL/L (ref 3.4–5.3)
PROT SERPL-MCNC: 7.2 G/DL (ref 6.4–8.3)
PTH-INTACT SERPL-MCNC: 78 PG/ML (ref 15–65)
SODIUM SERPL-SCNC: 138 MMOL/L (ref 135–145)
TRIGL SERPL-MCNC: 161 MG/DL
TSH SERPL DL<=0.005 MIU/L-ACNC: 1.41 UIU/ML (ref 0.3–4.2)

## 2024-05-17 NOTE — RESULT ENCOUNTER NOTE
Your ionized calcium level is unchanged currently compared with previous.  Blood cell counts are normal.  Please call or MyChart message me if you have any questions.      JESSK

## 2024-05-19 NOTE — RESULT ENCOUNTER NOTE
Your cholesterol is under very good control.  The mild  triglyceride elevation is likely related to not being fasting for this appointment.  Kidney and liver testing are normal.   Parathyroid hormone level is elevated but lower than previous.   Your urine testing is normal without elevated protein present.  Thyroid testing is normal.  Please call or MyChart message me if you have any questions.      PSK

## 2024-05-29 ENCOUNTER — MYC MEDICAL ADVICE (OUTPATIENT)
Dept: FAMILY MEDICINE | Facility: CLINIC | Age: 79
End: 2024-05-29
Payer: COMMERCIAL

## 2024-05-29 NOTE — TELEPHONE ENCOUNTER
Routing to provider to review and advise on patient's BP readings.    Ashvin Flaherty RN  [facility]

## 2024-06-27 ENCOUNTER — PATIENT OUTREACH (OUTPATIENT)
Dept: CARE COORDINATION | Facility: CLINIC | Age: 79
End: 2024-06-27
Payer: COMMERCIAL

## 2024-07-19 ENCOUNTER — TRANSFERRED RECORDS (OUTPATIENT)
Dept: HEALTH INFORMATION MANAGEMENT | Facility: CLINIC | Age: 79
End: 2024-07-19
Payer: COMMERCIAL

## 2024-07-23 ENCOUNTER — VIRTUAL VISIT (OUTPATIENT)
Dept: ENDOCRINOLOGY | Facility: CLINIC | Age: 79
End: 2024-07-23
Attending: FAMILY MEDICINE
Payer: COMMERCIAL

## 2024-07-23 DIAGNOSIS — E83.52 HYPERCALCEMIA: ICD-10-CM

## 2024-07-23 DIAGNOSIS — E21.3 HYPERPARATHYROIDISM (H): Primary | ICD-10-CM

## 2024-07-23 PROCEDURE — 99203 OFFICE O/P NEW LOW 30 MIN: CPT | Mod: 95 | Performed by: INTERNAL MEDICINE

## 2024-07-23 NOTE — PATIENT INSTRUCTIONS
Radiology- CT scan  For scheduling at [facility] ([facility], [facility], [place]), call [phone] or [phone]
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
## 2024-07-24 ENCOUNTER — TELEPHONE (OUTPATIENT)
Dept: ENDOCRINOLOGY | Facility: CLINIC | Age: 79
End: 2024-07-24

## 2024-07-25 ENCOUNTER — PATIENT OUTREACH (OUTPATIENT)
Dept: CARE COORDINATION | Facility: CLINIC | Age: 79
End: 2024-07-25
Payer: COMMERCIAL

## 2024-07-29 ENCOUNTER — TRANSFERRED RECORDS (OUTPATIENT)
Dept: HEALTH INFORMATION MANAGEMENT | Facility: CLINIC | Age: 79
End: 2024-07-29
Payer: COMMERCIAL

## 2024-08-01 ENCOUNTER — ANCILLARY PROCEDURE (OUTPATIENT)
Dept: CT IMAGING | Facility: CLINIC | Age: 79
End: 2024-08-01
Attending: INTERNAL MEDICINE
Payer: COMMERCIAL

## 2024-08-01 DIAGNOSIS — E21.3 HYPERPARATHYROIDISM (H): ICD-10-CM

## 2024-08-01 DIAGNOSIS — E83.52 HYPERCALCEMIA: ICD-10-CM

## 2024-08-01 LAB
CREAT BLD-MCNC: 0.9 MG/DL (ref 0.5–1)
EGFRCR SERPLBLD CKD-EPI 2021: >60 ML/MIN/1.73M2

## 2024-08-01 PROCEDURE — 82565 ASSAY OF CREATININE: CPT

## 2024-08-01 PROCEDURE — 70492 CT SFT TSUE NCK W/O & W/DYE: CPT | Performed by: STUDENT IN AN ORGANIZED HEALTH CARE EDUCATION/TRAINING PROGRAM

## 2024-08-01 RX ORDER — IOPAMIDOL 755 MG/ML
67 INJECTION, SOLUTION INTRAVASCULAR ONCE
Status: COMPLETED | OUTPATIENT
Start: 2024-08-01 | End: 2024-08-01

## 2024-08-01 RX ADMIN — IOPAMIDOL 67 ML: 755 INJECTION, SOLUTION INTRAVASCULAR at 13:06

## 2024-08-08 NOTE — RESULT ENCOUNTER NOTE
Hello -    Here are my comments about the recent results. Appear to have two adenomas-- can review further with surgery next week    Please let us know if you have any questions or concerns.    Regards,  Velvet Hanson MD

## 2024-08-11 ENCOUNTER — OFFICE VISIT (OUTPATIENT)
Dept: URGENT CARE | Facility: URGENT CARE | Age: 79
End: 2024-08-11
Payer: COMMERCIAL

## 2024-08-11 VITALS
SYSTOLIC BLOOD PRESSURE: 191 MMHG | TEMPERATURE: 99.8 F | WEIGHT: 175.2 LBS | HEART RATE: 82 BPM | DIASTOLIC BLOOD PRESSURE: 76 MMHG | OXYGEN SATURATION: 94 % | BODY MASS INDEX: 39.97 KG/M2

## 2024-08-11 DIAGNOSIS — J20.8 ACUTE VIRAL BRONCHITIS: Primary | ICD-10-CM

## 2024-08-11 DIAGNOSIS — E78.5 HYPERLIPIDEMIA LDL GOAL <100: ICD-10-CM

## 2024-08-11 DIAGNOSIS — Z20.822 SUSPECTED 2019 NOVEL CORONAVIRUS INFECTION: ICD-10-CM

## 2024-08-11 PROCEDURE — 99213 OFFICE O/P EST LOW 20 MIN: CPT | Performed by: INTERNAL MEDICINE

## 2024-08-11 PROCEDURE — 87635 SARS-COV-2 COVID-19 AMP PRB: CPT | Performed by: INTERNAL MEDICINE

## 2024-08-11 RX ORDER — TURMERIC 400 MG
1 CAPSULE ORAL DAILY
COMMUNITY

## 2024-08-11 RX ORDER — BENZONATATE 200 MG/1
200 CAPSULE ORAL 3 TIMES DAILY PRN
Qty: 30 CAPSULE | Refills: 0 | Status: SHIPPED | OUTPATIENT
Start: 2024-08-11

## 2024-08-11 ASSESSMENT — ENCOUNTER SYMPTOMS
HEADACHES: 0
FEVER: 0
NAUSEA: 1
CHILLS: 1
SHORTNESS OF BREATH: 0
WHEEZING: 0
COUGH: 1
RHINORRHEA: 1
VOMITING: 0
PALPITATIONS: 0
ABDOMINAL PAIN: 0
DIAPHORESIS: 1
DIARRHEA: 0
MYALGIAS: 0

## 2024-08-11 ASSESSMENT — PAIN SCALES - GENERAL: PAINLEVEL: NO PAIN (0)

## 2024-08-11 NOTE — PROGRESS NOTES
ASSESSMENT AND PLAN:      ICD-10-CM    1. Acute viral bronchitis  J20.8 Symptomatic COVID-19 Virus (Coronavirus) by PCR Nose     benzonatate (TESSALON) 200 MG capsule     nirmatrelvir and ritonavir (PAXLOVID) 300 mg/100 mg therapy pack      2. Suspected 2019 novel coronavirus infection  Z20.822 nirmatrelvir and ritonavir (PAXLOVID) 300 mg/100 mg therapy pack        Patient Instructions       Covid test - results tomorrow  You would qualify for paxlovid.  Sent to pharmacy.  Call to fill if positive     Hold cholesterol while on paxlovid  Take cozaar.    Monitor blood pressure     If shortness of breath or chest pain, go to ER      Vanda Nelson MD  [facility]    Subjective     Monica Samayoa is a 78 year old who presents for Patient presents with:  Cough: Patient seen in clinic today with coughing, runny nose, nausea and chills.  She states this started yesterday. She was up coughing all night. She thinks she might have COVID  Fever  Chills    an established patient of [facility].        Onset of symptoms was yesterday  Current and Associated symptoms: chills, sweats, runny nose, cough - non-productive, and nausea    Treatment measures tried include halls  Predisposing factors include ill contact: unknown    Review of Systems   Constitutional:  Positive for chills and diaphoresis. Negative for fever.   HENT:  Positive for rhinorrhea. Negative for congestion. Sore throat: scratchy.   Respiratory:  Positive for cough (dry). Negative for shortness of breath and wheezing.    Cardiovascular:  Negative for chest pain, palpitations and peripheral edema.   Gastrointestinal:  Positive for nausea. Negative for abdominal pain, diarrhea and vomiting.   Musculoskeletal:  Negative for myalgias.   Neurological:  Negative for headaches.         Current Outpatient Medications   Medication Sig Dispense Refill    aspirin 81 MG tablet Take  by mouth daily. *      benzonatate (TESSALON) 200 MG capsule Take 1  capsule (200 mg) by mouth 3 times daily as needed 30 capsule 0    cetirizine (RA CETIRIZINE) 10 MG tablet Take 10 mg by mouth Once Daily.      Cholecalciferol (VITAMIN D3) 2000 UNIT CAPS Take 1,000 Units by mouth daily      coenzyme Q10 (CO-Q10) 30 MG capsule Take 100 mg by mouth daily      fluticasone (FLONASE) 50 MCG/ACT nasal spray Spray 1-2 sprays into both nostrils as needed      losartan (COZAAR) 100 MG tablet TAKE 1 TABLET BY MOUTH EVERY DAY 90 tablet 1    Lutein 15-0.7 MG CAPS Take  by mouth.      nirmatrelvir and ritonavir (PAXLOVID) 300 mg/100 mg therapy pack Take 3 tablets by mouth 2 times daily for 5 days 30 tablet 0    nystatin (NYSTOP) 621555 UNIT/GM external powder APPLY POWDER LIBERALLY TO GROIN AND UNDER BREASTS THREE TIMES DAILY WITH RASH AND ONCE DAILY FOR PREVENTION 60 g 8    simvastatin (ZOCOR) 40 MG tablet TAKE 1 TABLET BY MOUTH AT BEDTIME 90 tablet 0    vitamin  B complex with vitamin C (VITAMIN  B COMPLEX) TABS Take 1 Cap by mouth daily.      VITAMIN C 500 MG PO CHEW 1 TABLET DAILY      Turmeric 400 MG CAPS 1 capsule by Other route daily       Component      Latest Ref [facility] 5/16/2024  3:40 PM   Sodium      135 - 145 mmol/L 138    Potassium      3.4 - 5.3 mmol/L 4.5    Carbon Dioxide (CO2)      22 - 29 mmol/L 20 (L)    Anion Gap      7 - 15 mmol/L 12    Urea Nitrogen      8.0 - 23.0 mg/dL 13.6    Creatinine      0.51 - 0.95 mg/dL 0.88    GFR Estimate      >60 mL/min/1.73m2 67    Calcium      8.8 - 10.2 mg/dL 11.0 (H)    Chloride      98 - 107 mmol/L 106    Glucose      70 - 99 mg/dL 100 (H)    Alkaline Phosphatase      40 - 150 U/L 55    AST      0 - 45 U/L 31    ALT      0 - 50 U/L 28    Protein Total      6.4 - 8.3 g/dL 7.2    Albumin      3.5 - 5.2 g/dL 4.6    Bilirubin Total      <=1.2 mg/dL 0.5    Patient Fasting? No       Legend:  (L) Low  (H) High        Objective    BP (!) 191/76 (BP Location: Right arm, Patient Position: Sitting, Cuff Size: Adult Large)   Pulse 82   Temp 99.8  F  (37.7  C) (Tympanic)   Wt 79.5 kg (175 lb 3.2 oz)   LMP  (LMP Unknown)   SpO2 94%   BMI 39.97 kg/m    Physical Exam  Vitals reviewed.   Constitutional:       Appearance: Normal appearance. She is ill-appearing. She is not toxic-appearing.   HENT:      Ears:      Comments: Wearing hearing aids     Nose: Congestion present.      Mouth/Throat:      Mouth: Mucous membranes are moist.      Pharynx: Oropharynx is clear.   Eyes:      Conjunctiva/sclera: Conjunctivae normal.   Cardiovascular:      Rate and Rhythm: Normal rate and regular rhythm.      Pulses: Normal pulses.      Heart sounds: Normal heart sounds.   Pulmonary:      Effort: Pulmonary effort is normal. No respiratory distress.      Breath sounds: Normal breath sounds.      Comments: Breathing heavy due to mask  Musculoskeletal:      Right lower leg: No edema.      Left lower leg: No edema.   Neurological:      Mental Status: She is alert.

## 2024-08-11 NOTE — PATIENT INSTRUCTIONS
Covid test - results tomorrow  You would qualify for paxlovid.  Sent to pharmacy.  Call to fill if positive     Hold cholesterol while on paxlovid  Take cozaar.    Monitor blood pressure     If shortness of breath or chest pain, go to ER

## 2024-08-12 ENCOUNTER — TELEPHONE (OUTPATIENT)
Dept: NURSING | Facility: CLINIC | Age: 79
End: 2024-08-12
Payer: COMMERCIAL

## 2024-08-12 LAB — SARS-COV-2 RNA RESP QL NAA+PROBE: POSITIVE

## 2024-08-12 NOTE — TELEPHONE ENCOUNTER
Coronavirus (COVID-19) Notification    Caller Name (Patient, parent, daughter/son, grandparent, etc)  Patient    Reason for call  Notify of Positive Coronavirus (COVID-19) lab results, assess symptoms,  review [facility] recommendations    Lab Result    Lab test:  2019-nCoV rRt-PCR or SARS-CoV-2 PCR    Oropharyngeal AND/OR nasopharyngeal swabs is POSITIVE for 2019-nCoV RNA/SARS-COV-2 PCR (COVID-19 virus)      Gather patient reported symptoms   Assessment   Current Symptoms at time of phone call, reported by patient: (if no symptoms, document: No symptoms] Cough, nasea   Date of symptom(s) onset (if applicable) 8/10/2024     If at time of call, Patients symptoms have worsened, the Patient should contact 911 or have someone drive them to Emergency Dept promptly:    If Patient calling 911, inform 911 personal that you have tested positive for the Coronavirus (COVID-19).  Place mask on and await 911 to arrive.  If Emergency Dept, If possible, please have another adult drive you to the Emergency Dept but you need to wear mask when in contact with other people.      Treatment Options:   Is patient interested in discussing COVID treatment? Yes.   Is this a [place] or [place] Patient? No:     Are you an agent trained to scheduling? No.   COVID Positive/Requesting COVID treatment    Review information with Patient    Your result was positive. This means you have COVID-19 (coronavirus).    How can I protect others?    These guidelines are for isolating before returning to work, school or .    If you DO have symptoms  Stay home and away from others   For at least 5 days after your symptoms started, AND  You are fever free for 24 hours (with no medicine that reduces fever), AND  Your other symptoms are better  Wear a mask for 10 full days anytime you are around others    If you DON'T have symptoms  Stay home and away from others for at least 5 days after your positive test  Wear a mask for 10 full days anytime  you are around others    There may be different guidelines for healthcare facilities.  Please check with the specific sites before arriving.    If you have been told by a doctor that you were severely ill with COVID-19 or are immunocompromised, you should isolate for at least 10 days.    You should not go back to work until you meet the guidelines above for ending your home isolation. You don't need to be retested for COVID-19 before going back to work--studies show that you won't spread the virus if it's been at least 10 days since your symptoms started (or 20 days, if you have a weak immune system).    Employers, schools, and daycares: This is an official notice for this person's medical guidelines for returning in-person.  They must meet the above guidelines before going back to work, school or  in person.    You will receive a positive COVID-19 letter via [employer] or the mail soon with additional self-care information.    Would you like me to review some of that information with you now?  Yes    How can I take care of myself?    Get lots of rest. Drink extra fluids (unless a doctor has told you not to).    Take Tylenol (acetaminophen) for fever or pain. If you have liver or kidney problems, ask your family doctor if it's okay to take Tylenol.     Take either:   650 mg (two 325 mg pills) every 4 to 6 hours, or   1,000 mg (two 500 mg pills) every 8 hours as needed.   Note: Do not take more than 3,000 mg in one day. Acetaminophen is found in many medicines (both prescribed and over-the-counter medicines). Read all labels to be sure you don't take too much.    For children, check the Tylenol bottle for the right dose (based on their age or weight).    If you have other health problems (like cancer, heart failure, an organ transplant or severe kidney disease): Call your specialty clinic if you don't feel better in the next 2 days.    Know when to call 911: Emergency warning signs include:  Trouble breathing or  shortness of breath  Pain or pressure in the chest that doesn't go away  Feeling confused like you haven't felt before, or not being able to wake up  Bluish-colored lips or face      If you were tested for an upcoming procedure, please contact your provider for next steps.    Iman Perry

## 2024-08-13 RX ORDER — SIMVASTATIN 40 MG
40 TABLET ORAL AT BEDTIME
Qty: 90 TABLET | Refills: 2 | Status: SHIPPED | OUTPATIENT
Start: 2024-08-13

## 2024-08-22 ENCOUNTER — MYC MEDICAL ADVICE (OUTPATIENT)
Dept: FAMILY MEDICINE | Facility: CLINIC | Age: 79
End: 2024-08-22
Payer: COMMERCIAL

## 2024-08-22 NOTE — TELEPHONE ENCOUNTER
Pt requesting if she need appt to discuss her weight loss maybe due to Covid.        Routing to provider to review and advise if we can schedule a virtual for this ?

## 2024-08-27 ENCOUNTER — TELEPHONE (OUTPATIENT)
Dept: FAMILY MEDICINE | Facility: CLINIC | Age: 79
End: 2024-08-27

## 2024-08-27 NOTE — TELEPHONE ENCOUNTER
Pt calling, she was dx with covid at urgent care on 8/11/24 and treated with Paxlovid. She does not have a fever and her sx have improved. She tested again and it was positive. She is asking if she needs reschedule appointment's next week. Advised she may test positive for several weeks and no need to retest unless new sx.  Advised ok to go to appointment and be out in public.  Advised quarantine is until fever free for 24 hours and sx are improving, then mask for 5 days, she is past that.  Nancy Mendoza BSN, RN

## 2024-09-03 ENCOUNTER — OFFICE VISIT (OUTPATIENT)
Dept: SURGERY | Facility: CLINIC | Age: 79
End: 2024-09-03
Attending: INTERNAL MEDICINE
Payer: COMMERCIAL

## 2024-09-03 VITALS
DIASTOLIC BLOOD PRESSURE: 80 MMHG | OXYGEN SATURATION: 99 % | HEIGHT: 55 IN | WEIGHT: 175 LBS | HEART RATE: 65 BPM | BODY MASS INDEX: 40.5 KG/M2 | SYSTOLIC BLOOD PRESSURE: 168 MMHG

## 2024-09-03 DIAGNOSIS — E21.3 HYPERPARATHYROIDISM (H): ICD-10-CM

## 2024-09-03 DIAGNOSIS — E83.52 HYPERCALCEMIA: ICD-10-CM

## 2024-09-03 PROCEDURE — 99204 OFFICE O/P NEW MOD 45 MIN: CPT | Performed by: SURGERY

## 2024-09-03 NOTE — LETTER
September 5, 2024        Heather Adams MD        RE:   Monica Samayoa 1945      Dear Colleague,    Thank you for referring your patient, Monica Samayoa, to [facility]. Please see a copy of my visit note below.    Monica Samayoa is a 79 year old female who presented with a history of hypercalcemia.  This has been picked up over the last couple of years.  Most recent calcium was 11.  For follow-up of this, a parathyroid hormone level was ordered and this was mildly elevated at 78.  Patient does not have a history of nephrolithiasis.  Her vitamin D has been repleted at 34.  Patient does complain of constipation and fatigue.  Occasional bone pain.  Recent bone scan showed osteoporosis.  She has a history of breast cancer and is in remission.  She is here for further discussion of her hypercalcemia.     PMH:  Monica Samayoa  has a past medical history of Allergy, Ankle fracture, Arm pain, Atypical chest pain, Corn, Heart murmur, Hyperlipidemia LDL goal < 100, Hypertension, SADIE (iron deficiency anaemia), Malignant neoplasm (H), MEDICAL HISTORY OF -, Precancerous skin lesion, and Travel.  PSH:  Monica Samayoa  has a past surgical history that includes surgical history of - ; Repair ectropion bilateral (8/15/12); Breast surgery; Repair ectropion bilateral (7/17/2013); biopsy (11/18/2010); colonoscopy (5/24/2013); and orthopedic surgery (1995).     Home medications and allergies reviewed.     Social History:  Monica Samayoa  reports that she has never smoked. She has never used smokeless tobacco. She reports current alcohol use. She reports that she does not use drugs.  Family History:  Monica Samayoa family history includes Asthma in some other family members; Breast Cancer in some other family members; C.A.D. in her father; Diabetes in her maternal uncle and another family member; Osteoporosis in her maternal grandmother; Ovarian Cancer in her cousin.     ROS:  The 10 point Review of  "Systems is negative other than noted in the HPI.  Complains of weight gain, fatigue     Physical Exam:  Blood pressure (!) 168/80, pulse 65, height 1.397 m (4' 7\"), weight 79.4 kg (175 lb), SpO2 99%, not currently breastfeeding.  175 lbs 0 oz  Short, overweight female in no distress.  Patient has a pleasant affect and communicates well.   Pupils equal round and reactive to light.   No cervical lymphadenopathy or thyromegaly.  Fairly stout, short neck.  Limited neck range of motion.  Lung fields clear, breathing comfortably.   Heart normal sinus rhythm.  No murmurs rubs or gallops.  Abdomen soft, nontender, nondistended.  Skin warm, dry.  No obvious rashes or lesions.     All new lab and imaging data was reviewed.  High-resolution CT scan of the neck suggests bilateral parathyroid adenomas      Assessment/plan: [place] 79-year-old female with what appears to be hyperparathyroidism.  This, in conjunction with her osteoporosis, puts her at high risk for frailty fracture.  Her imaging studies suggest bilateral disease in the neck.  He will be difficult to optimize her bone health while she continues to have untreated hyperparathyroidism.  I have recommended bilateral neck exploration with parathyroidectomy.  This I think could be done as an outpatient.  It is possible that 1 of these lesions could be a lymph node or a thyroid nodule, but both areas are fairly convincing.  This would be done with the recurrent laryngeal nerve monitor and rapid PTH assay.  I explained the risks of surgery which include bleeding, infection, failure to cure, hypoparathyroidism, and injury to the recurrent laryngeal nerve.  Patient was interested in surgery, we will get this scheduled at her convenience.  Surgical co-morbities include obesity, constipation, hypertension, hyperlipidemia, history of breast cancer, osteoporosis.    Again, thank you for allowing me to participate in the care of your patient.      Sincerely,      Jamir PRIEST" MD [place]

## 2024-09-04 ENCOUNTER — TELEPHONE (OUTPATIENT)
Dept: SURGERY | Facility: CLINIC | Age: 79
End: 2024-09-04
Payer: COMMERCIAL

## 2024-09-04 NOTE — TELEPHONE ENCOUNTER
Type of surgery: bilateral neck exploration, excision dual parathyroid adenomas  Location of surgery: [facility]  Date and time of surgery: 11/5/24 12:30pm  Surgeon: Dr Pyle   Pre-Op Appt Date: pt to schedule  Post-Op Appt Date: pt to schedule   Packet sent out: Yes  Pre-cert/Authorization completed:  Not Applicable  Date: 9/3/24

## 2024-09-05 NOTE — PROGRESS NOTES
Surgery Consultation, [facility], FERNANDA Pyle MD, MD    Monica Samayoa MRN# [id number]   YOB: 1945 Age: 79 year old     PCP:  Heather Adams [phone]    Chief Complaint: Hyperparathyroidism    Pt was seen in consultation from Heather Adams.    History of Present Illness:  Monica Samayoa is a 79 year old female who presented with a history of hypercalcemia.  This has been picked up over the last couple of years.  Most recent calcium was 11.  For follow-up of this, a parathyroid hormone level was ordered and this was mildly elevated at 78.  Patient does not have a history of nephrolithiasis.  Her vitamin D has been repleted at 34.  Patient does complain of constipation and fatigue.  Occasional bone pain.  Recent bone scan showed osteoporosis.  She has a history of breast cancer and is in remission.  She is here for further discussion of her hypercalcemia.    PMH:  Monica Samayoa  has a past medical history of Allergy, Ankle fracture, Arm pain, Atypical chest pain, [place], Heart murmur, Hyperlipidemia LDL goal < 100, Hypertension, SADIE (iron deficiency anaemia), Malignant neoplasm (H), MEDICAL HISTORY OF -, Precancerous skin lesion, and Travel.  PSH:  Monica Samayoa  has a past surgical history that includes surgical history of - ; Repair ectropion bilateral (8/15/12); Breast surgery; Repair ectropion bilateral (7/17/2013); biopsy (11/18/2010); colonoscopy (5/24/2013); and orthopedic surgery (1995).    Home medications and allergies reviewed.    Social History:  Monica Samayoa  reports that she has never smoked. She has never used smokeless tobacco. She reports current alcohol use. She reports that she does not use drugs.  Family History:  Monica Samayoa family history includes Asthma in some other family members; Breast Cancer in some other family members; C.A.D. in her father; Diabetes in her maternal uncle and another family member; Osteoporosis in her maternal grandmother;  "Ovarian Cancer in her cousin.    ROS:  The 10 point Review of Systems is negative other than noted in the HPI.  Complains of weight gain, fatigue    Physical Exam:  Blood pressure (!) 168/80, pulse 65, height 1.397 m (4' 7\"), weight 79.4 kg (175 lb), SpO2 99%, not currently breastfeeding.  175 lbs 0 oz  Short, overweight female in no distress.  Patient has a pleasant affect and communicates well.   Pupils equal round and reactive to light.   No cervical lymphadenopathy or thyromegaly.  Fairly stout, short neck.  Limited neck range of motion.  Lung fields clear, breathing comfortably.   Heart normal sinus rhythm.  No murmurs rubs or gallops.  Abdomen soft, nontender, nondistended.  Skin warm, dry.  No obvious rashes or lesions.    All new lab and imaging data was reviewed.  High-resolution CT scan of the neck suggests bilateral parathyroid adenomas     Assessment/plan: Santos 79-year-old female with what appears to be hyperparathyroidism.  This, in conjunction with her osteoporosis, puts her at high risk for frailty fracture.  Her imaging studies suggest bilateral disease in the neck.  He will be difficult to optimize her bone health while she continues to have untreated hyperparathyroidism.  I have recommended bilateral neck exploration with parathyroidectomy.  This I think could be done as an outpatient.  It is possible that 1 of these lesions could be a lymph node or a thyroid nodule, but both areas are fairly convincing.  This would be done with the recurrent laryngeal nerve monitor and rapid PTH assay.  I explained the risks of surgery which include bleeding, infection, failure to cure, hypoparathyroidism, and injury to the recurrent laryngeal nerve.  Patient was interested in surgery, we will get this scheduled at her convenience.  Surgical co-morbities include obesity, constipation, hypertension, hyperlipidemia, history of breast cancer, osteoporosis.    Shaka Pyle M.D.  [facility]  [phone]    Please route or send letter to:  Primary Care Provider (PCP) and Referring Provider

## 2024-09-15 DIAGNOSIS — I10 HYPERTENSION GOAL BP (BLOOD PRESSURE) < 140/90: ICD-10-CM

## 2024-09-16 RX ORDER — LOSARTAN POTASSIUM 100 MG/1
100 TABLET ORAL DAILY
Qty: 90 TABLET | Refills: 0 | Status: SHIPPED | OUTPATIENT
Start: 2024-09-16

## 2024-10-07 ENCOUNTER — THERAPY VISIT (OUTPATIENT)
Dept: PHYSICAL THERAPY | Facility: CLINIC | Age: 79
End: 2024-10-07
Payer: COMMERCIAL

## 2024-10-07 DIAGNOSIS — M54.9 BACK PAIN: Primary | ICD-10-CM

## 2024-10-07 PROCEDURE — 97110 THERAPEUTIC EXERCISES: CPT | Mod: GP

## 2024-10-07 PROCEDURE — 97161 PT EVAL LOW COMPLEX 20 MIN: CPT | Mod: GP

## 2024-10-07 NOTE — PROGRESS NOTES
PHYSICAL THERAPY EVALUATION  Type of Visit: Evaluation        Fall Risk Screen:  Fall screen completed by: PT  Have you fallen 2 or more times in the past year?: No  Have you fallen and had an injury in the past year?: No  Is patient a fall risk?: No    Subjective   Patient is a 79 year old female who is self referred to physical therapy and notes she has been having back pain since she had COVID in August. She feels this may be related to how much she was coughing when she was sick. Over the last couple weeks she feels the pain has gotten a bit better, though feels it is still a 6/10 when she is in pain. She has not found anything that makes her pain better or worse, though she has been taking Tylenol which seems to help a little bit. She notes pain with extended time in one posture located mostly in her thoracic spine and ribs. Patient was not sure if she still needed to come to PT since she felt she was improving but was hoping to get some exercises that may help the pain subside. She is hoping to reduce pain for improved tolerance to functional mobility.         Presenting condition or subjective complaint: back pain  Date of onset:      Relevant medical history:     Past Medical History:   Diagnosis Date    Allergy     Dr. Murillo    Ankle fracture     left has pins    Arm pain     ortho    Atypical chest pain     Dr. jeff ferguson [facility] [place]    [place]     Heart murmur     Hyperlipidemia LDL goal < 100     vytorin    Hypertension     had stress test with cardiology ?2002    SADIE (iron deficiency anaemia)     Malignant neoplasm (H)     breast cancer    MEDICAL HISTORY OF -     ?leaky valve use to get SBE prophylaxsis    Precancerous skin lesion     Travel      Dates & types of surgery: breast cancer 2010    Prior diagnostic imaging/testing results:       Prior therapy history for the same diagnosis, illness or injury:        Prior Level of Function  Transfers: Independent  Ambulation: Independent  ADL:  Independent  IADL: Driving, Finances, Housekeeping, Laundry, Meal preparation, Medication management    Living Environment  Social support: With a significant other or spouse   Type of home: Multi-level   Stairs to enter the home:         Ramp: No   Stairs inside the home: Yes 24 Is there a railing: Yes     Help at home: None  Equipment owned:       Employment: No    Hobbies/Interests: reading    Patient goals for therapy:      Pain assessment:  Patient notes thoracic back pain is 6/10     Objective      Cognitive Status Examination  Orientation: Oriented to person, place and time   Level of Consciousness: Alert  Follows Commands and Answers Questions: 100% of the time  Personal Safety and Judgement: Intact  Memory: Intact    OBSERVATION: Patient presents ambulating IND in clinic  INTEGUMENTARY: Intact  POSTURE:  Thoracic kyphosis in sitting and standing.   PALPATION: TTP within intercostal muscles bilaterally as well as slight tenderness to thoracic vertebrae  RANGE OF MOTION:  Reduced mobility with rotation in thoracic spine  STRENGTH:  Grossly reduced BLE but equal side to side    BED MOBILITY: Independent    TRANSFERS: Independent    WHEELCHAIR MOBILITY: NA    GAIT:   Level of [place]: WFL  Assistive Device(s): None  Gait Deviations: WFL  Gait Distance: within clini  Stairs: NT     BALANCE:  Not formally assessed on evaluation    SPECIAL TESTS  5 Times Sit-to-Stand (5TSTS)  8.55 sec from standard height chair      SENSATION: UE Sensation WNL, LE Sensation WNL      Assessment & Plan   CLINICAL IMPRESSIONS  Medical Diagnosis: Back pain    Treatment Diagnosis: Reduced thoracic mobility, reduced strength   Impression/Assessment:  Patient is a 79 year old patient who presents to PT with continued thoracic back pain which she believes is related to intense coughing when she had covid 2 months ago. She demonstrates tenderness to palpation throughout thoracic vertebrae and intercostal muscles as well as reduced  mobility through thoracic spine. Her priority for physical therapy is to assist with reduction of back pain for pain free functional mobility. She would benefit from a short POC with emphasis on improved mobility through thoracic spine and strengthening exercises to assist in pain reduction.     Clinical Decision Making (Complexity):  Clinical Presentation: Stable/Uncomplicated  Clinical Presentation Rationale: based on medical and personal factors listed in PT evaluation  Clinical Decision Making (Complexity): Low complexity    PLAN OF CARE  Treatment Interventions:  Interventions: Gait Training, Manual Therapy, Neuromuscular Re-education, Therapeutic Activity, Therapeutic Exercise    Long Term Goals     PT Goal 1  Goal Identifier: Pain  Goal Description: Patient willl be able to complete daily tasks with a pain remaining at or below 3/10  Rationale: to maximize safety and independence with performance of ADLs and functional tasks;to maximize safety and independence within the home;to maximize safety and independence within the community  Target Date: 01/04/25  PT Goal 2  Goal Identifier: HEP  Goal Description: Patient will demonstrate IND with HEP for continuation of exercises at home.  Rationale: to maximize safety and independence with performance of ADLs and functional tasks;to maximize safety and independence within the home;to maximize safety and independence within the community  Target Date: 01/04/25      Frequency of Treatment: 2x/month  Duration of Treatment: 2 more visits    Recommended Referrals to Other Professionals:  None  Education Assessment:   Learner/Method: Patient  Education Comments: Verbal, Visual    Risks and benefits of evaluation/treatment have been explained.   Patient/Family/caregiver agrees with Plan of Care.     Evaluation Time:     PT Eval, Low Complexity Minutes (59777): 15     Signing Clinician: Francie Allen, PT, DPT        [facility]                                                                                    OUTPATIENT PHYSICAL THERAPY      PLAN OF TREATMENT FOR OUTPATIENT REHABILITATION   Patient's Last Name, First Name, Monica Landry YOB: 1945   Provider's Name   [facility]   Medical Record No.  [id number]     Onset Date:    Start of Care Date: 10/07/24     Medical Diagnosis:  Back pain      PT Treatment Diagnosis:  Reduced thoracic mobility, reduced strength Plan of Treatment  Frequency/Duration: 2x/month/ 2 more visits    Certification date from 10/07/24 to 01/04/25         See note for plan of treatment details and functional goals     Francie Allen, PT, DPT                        I CERTIFY THE NEED FOR THESE SERVICES FURNISHED UNDER        THIS PLAN OF TREATMENT AND WHILE UNDER MY CARE     (Physician attestation of this document indicates review and certification of the therapy plan).              Referring Provider:  Referred Self    Initial Assessment  See Epic Evaluation- Start of Care Date: 10/07/24

## 2024-10-17 ENCOUNTER — TRANSFERRED RECORDS (OUTPATIENT)
Dept: HEALTH INFORMATION MANAGEMENT | Facility: CLINIC | Age: 79
End: 2024-10-17
Payer: COMMERCIAL

## 2024-10-22 ENCOUNTER — THERAPY VISIT (OUTPATIENT)
Dept: PHYSICAL THERAPY | Facility: CLINIC | Age: 79
End: 2024-10-22
Payer: COMMERCIAL

## 2024-10-22 DIAGNOSIS — M54.9 BACK PAIN: Primary | ICD-10-CM

## 2024-10-22 PROCEDURE — 97110 THERAPEUTIC EXERCISES: CPT | Mod: GP | Performed by: PHYSICAL THERAPIST

## 2024-10-24 ENCOUNTER — OFFICE VISIT (OUTPATIENT)
Dept: FAMILY MEDICINE | Facility: CLINIC | Age: 79
End: 2024-10-24
Payer: COMMERCIAL

## 2024-10-24 VITALS
OXYGEN SATURATION: 96 % | BODY MASS INDEX: 39.14 KG/M2 | SYSTOLIC BLOOD PRESSURE: 137 MMHG | RESPIRATION RATE: 18 BRPM | TEMPERATURE: 97.7 F | HEIGHT: 56 IN | WEIGHT: 174 LBS | HEART RATE: 72 BPM | DIASTOLIC BLOOD PRESSURE: 75 MMHG

## 2024-10-24 DIAGNOSIS — E21.3 HYPERPARATHYROIDISM (H): ICD-10-CM

## 2024-10-24 DIAGNOSIS — I10 HYPERTENSION GOAL BP (BLOOD PRESSURE) < 140/90: ICD-10-CM

## 2024-10-24 DIAGNOSIS — E78.5 HYPERLIPIDEMIA LDL GOAL <100: ICD-10-CM

## 2024-10-24 DIAGNOSIS — Z01.818 PREOP GENERAL PHYSICAL EXAM: Primary | ICD-10-CM

## 2024-10-24 LAB
ERYTHROCYTE [DISTWIDTH] IN BLOOD BY AUTOMATED COUNT: 13.5 % (ref 10–15)
HCT VFR BLD AUTO: 40.4 % (ref 35–47)
HGB BLD-MCNC: 13.3 G/DL (ref 11.7–15.7)
MCH RBC QN AUTO: 29 PG (ref 26.5–33)
MCHC RBC AUTO-ENTMCNC: 32.9 G/DL (ref 31.5–36.5)
MCV RBC AUTO: 88 FL (ref 78–100)
PLATELET # BLD AUTO: 239 10E3/UL (ref 150–450)
RBC # BLD AUTO: 4.59 10E6/UL (ref 3.8–5.2)
WBC # BLD AUTO: 7.2 10E3/UL (ref 4–11)

## 2024-10-24 PROCEDURE — 99214 OFFICE O/P EST MOD 30 MIN: CPT | Performed by: FAMILY MEDICINE

## 2024-10-24 PROCEDURE — 36415 COLL VENOUS BLD VENIPUNCTURE: CPT | Performed by: FAMILY MEDICINE

## 2024-10-24 PROCEDURE — 93000 ELECTROCARDIOGRAM COMPLETE: CPT | Performed by: FAMILY MEDICINE

## 2024-10-24 PROCEDURE — 85027 COMPLETE CBC AUTOMATED: CPT | Performed by: FAMILY MEDICINE

## 2024-10-24 ASSESSMENT — PAIN SCALES - GENERAL
PAINLEVEL_OUTOF10: SEVERE PAIN (7)
PAINLEVEL_OUTOF10: SEVERE PAIN (7)

## 2024-10-24 NOTE — PROGRESS NOTES
Preoperative Evaluation  [address] [address]  Phone: [phone]  Primary Provider: Heather Adams MD  Pre-op Performing Provider: Heather Adams MD  Oct 24, 2024             10/21/2024   Surgical Information   What procedure is being done? Parathyroid surgery    Facility or Hospital where procedure/surgery will be performed: [facility]    Who is doing the procedure / surgery? Dr. Jamir Pyle    Date of surgery / procedure: 11/5/2024    Time of surgery / procedure: 12:30 pm    Where do you plan to recover after surgery? at home with family        Patient-reported     Fax number for surgical facility: Note does not need to be faxed, will be available electronically in Epic.    Assessment & Plan     The proposed surgical procedure is considered INTERMEDIATE risk.    Preop general physical exam  For Hyperparathyroidism (H)   Proceed with surgery as planned. Stopping supplements 1-2 weeks before procedure.             - EKG 12-lead complete w/read - Clinics  - CBC with platelets; Future  - CBC with platelets      Hypertension goal BP (blood pressure) < 140/90  BP under adequate control.   Hold losartan the night prior to surgery recommended.      Hyperlipidemia LDL goal <100  Continue current simvastatin use.             Risks and Recommendations  The patient has the following additional risks and recommendations for perioperative complications:   - No identified additional risk factors other than previously addressed    Antiplatelet or Anticoagulation Medication Instructions   - aspirin: Discontinue aspirin 7-10 days prior to procedure to reduce bleeding risk. It should be resumed postoperatively.     Additional Medication Instructions  Take all scheduled medications on the day of surgery EXCEPT for modifications listed below:   - ACE/ARB: DO NOT TAKE on day of surgery (minimum 11 hours for general  anesthesia).    Recommendation  Approval given to proceed with proposed procedure, without further diagnostic evaluation.      The longitudinal plan of care for the diagnosis(es)/condition(s) as documented were addressed during this visit. Due to the added complexity in care, I will continue to support Monica in the subsequent management and with ongoing continuity of care.        Mahad Anderson is a 79 year old, presenting for the following:  Pre-Op Exam          10/24/2024     3:06 PM   Additional Questions   Roomed by Miguelina TIMMONS   Accompanied by self     HPI related to upcoming procedure: 79 year old with hypertension, hyperlipidemia, and osteoporosis with elevated calcium level found to be related to hyperparathyroidism.         10/21/2024   Pre-Op Questionnaire   Have you ever had a heart attack or stroke? No    Have you ever had surgery on your heart or blood vessels, such as a stent placement, a coronary artery bypass, or surgery on an artery in your head, neck, heart, or legs? No    Do you have chest pain with activity? No    Do you have a history of heart failure? No    Do you currently have a cold, bronchitis or symptoms of other infection? No    Do you have a cough, shortness of breath, or wheezing? No    Do you or anyone in your family have previous history of blood clots? No    Do you or does anyone in your family have a serious bleeding problem such as prolonged bleeding following surgeries or cuts? No    Have you ever had problems with anemia or been told to take iron pills? (!) YES 30 years ago. No recent abnormality    Have you had any abnormal blood loss such as black, tarry or bloody stools, or abnormal vaginal bleeding? No    Have you ever had a blood transfusion? No    Are you willing to have a blood transfusion if it is medically needed before, during, or after your surgery? Yes    Have you or any of your relatives ever had problems with anesthesia? No    Do you have sleep apnea, excessive  snoring or daytime drowsiness? No    Do you have any artifical heart valves or other implanted medical devices like a pacemaker, defibrillator, or continuous glucose monitor? No    Do you have artificial joints? No    Are you allergic to latex? No        Patient-reported     Health Care Directive  Patient does not have a Health Care Directive: Advance Directive received and scanned. Click on Code in the patient header to view.    Preoperative Review of    reviewed - November 2023 small hydrocodone script following eyelid surgery.      Status of Chronic Conditions:  See problem list for active medical problems.  Problems all longstanding and stable, except as noted/documented.  See ROS for pertinent symptoms related to these conditions.    Patient Active Problem List    Diagnosis Date Noted    Morbid obesity (H) 08/17/2017     Priority: Medium     Comorbid conditions:  HTN, hyperlipidemia,       Constipation, unspecified constipation type 09/10/2016     Priority: Medium    Essential hypertension with goal blood pressure less than 140/90 08/26/2016     Priority: Medium    Elevated TSH 09/02/2015     Priority: Medium    Candidal intertrigo 11/25/2014     Priority: Medium    Osteopenia 08/05/2013     Priority: Medium    Vitamin D deficiency 01/19/2012     Priority: Medium    Watery eyes 08/30/2011     Priority: Medium    Glossitis 04/06/2011     Priority: Medium    Hypertension goal BP (blood pressure) < 140/90 01/20/2011     Priority: Medium    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 yes        Iron deficiency anemia      Priority: Medium     Diagnosis updated by automated process. Provider to review and confirm.       Hyperlipidemia LDL goal <100      Priority: Medium     McDonald 10-year CHD Risk Score: 11% (20 Total Points)   Values used to calculate score:     Age: 67 years -- Points: 12     Total Cholesterol: 173 mg/dL -- Points: 1     HDL Cholesterol: 51 mg/dL -- Points: 0     Systolic BP (treated): 142 mmHg -- Points: 5     The patient is not a smoker. -- Points: 0     The patient has not been diagnosed with diabetes. -- Points: 0     The patient has a family history of CHD. -- Points: 2       Allergic state      Priority: Medium     Dr. Murillo  (Problem list name updated by automated process. Provider to review and confirm.)      Precancerous skin lesion      Priority: Medium      Past Medical History:   Diagnosis Date    Allergy     Dr. Murillo    Ankle fracture     left has pins    Arm pain     ortho    Arthritis     Atypical chest pain     Dr. jeff ferguson [facility]    [place]     Heart murmur     Hyperlipidemia LDL goal < 100     vytorin    Hypertension     had stress test with cardiology ?2002    SADIE (iron deficiency anaemia)     Malignant neoplasm (H)     breast cancer    MEDICAL HISTORY OF -     ?leaky valve use to get SBE prophylaxsis    Precancerous skin lesion     Travel      Past Surgical History:   Procedure Laterality Date    BIOPSY  11/18/2010    left breast cancer    BREAST SURGERY      lumpectomy    COLONOSCOPY  5/24/2013    ORTHOPEDIC SURGERY  1995    left ankle, fibula    REPAIR ECTROPION BILATERAL  8/15/12    REPAIR ECTROPION BILATERAL  7/17/2013    Procedure: REPAIR ECTROPION BILATERAL;  BILATERAL LOWER EYELID ECTROPION REPAIR;  Surgeon: Neel Latham MD;  Location: [facility]    SURGICAL HISTORY OF -       left ankle pins     Current Outpatient Medications   Medication Sig Dispense Refill    aspirin 81 MG tablet Take  by mouth daily. *      cetirizine (RA CETIRIZINE) 10 MG tablet Take 10 mg by mouth Once Daily.      Cholecalciferol (VITAMIN D3) 2000 UNIT CAPS Take 1,000 Units by mouth daily       coenzyme Q10 (CO-Q10) 30 MG capsule Take 100 mg by mouth daily      fluticasone (FLONASE) 50 MCG/ACT nasal spray Spray 1-2 sprays into both nostrils as needed      losartan (COZAAR) 100 MG tablet Take 1 tablet (100 mg) by mouth daily. 90 tablet 0    Lutein 15-0.7 MG CAPS Take  by mouth.      nystatin (NYSTOP) 923612 UNIT/GM external powder APPLY POWDER LIBERALLY TO GROIN AND UNDER BREASTS THREE TIMES DAILY WITH RASH AND ONCE DAILY FOR PREVENTION 60 g 8    simvastatin (ZOCOR) 40 MG tablet Take 1 tablet (40 mg) by mouth at bedtime 90 tablet 2    Turmeric 400 MG CAPS 1 capsule by Other route daily      vitamin  B complex with vitamin C (VITAMIN  B COMPLEX) TABS Take 1 Cap by mouth daily.      benzonatate (TESSALON) 200 MG capsule Take 1 capsule (200 mg) by mouth 3 times daily as needed 30 capsule 0       Allergies   Allergen Reactions    Animal Dander Other (See Comments)     Cats-gets watery eyes    Dust Mites     Grass Unknown    Pollen Extract Unknown    Seasonal Allergies Nausea     Congested   Pollen tree grass cats  Had shots in past        Social History     Tobacco Use    Smoking status: Never     Passive exposure: Never    Smokeless tobacco: Never   Substance Use Topics    Alcohol use: Yes     Comment: 1-2  glasses of wine a month     Family History   Problem Relation Age of Onset    C.A.D. Father     Asthma Other     Breast Cancer Other         maternal aunt, paternal GM, aunt paternal    Diabetes Other         Uncle maternal    Breast Cancer Other     Asthma Other     Osteoporosis Maternal Grandmother     Ovarian Cancer Cousin     Diabetes Maternal Uncle     Breast Cancer Paternal Grandfather     Breast Cancer Paternal Grandmother     Cancer - colorectal No family hx of     Anesthesia Reaction No family hx of      History   Drug Use No             Review of Systems  Constitutional, HEENT, cardiovascular, pulmonary, GI, , musculoskeletal, neuro, skin, endocrine and psych systems are negative, except as  "otherwise noted.    Objective    /75 (BP Location: Right arm, Patient Position: Right side, Cuff Size: Adult Large)   Pulse 72   Temp 97.7  F (36.5  C) (Oral)   Resp 18   Ht 1.416 m (4' 7.75\")   Wt 78.9 kg (174 lb)   LMP  (LMP Unknown)   SpO2 96%   BMI 39.36 kg/m     Estimated body mass index is 39.36 kg/m  as calculated from the following:    Height as of this encounter: 1.416 m (4' 7.75\").    Weight as of this encounter: 78.9 kg (174 lb).  Physical Exam  GENERAL: alert, no distress, and obese  EYES: Eyes grossly normal to inspection, PERRL and conjunctivae and sclerae normal  HENT: ear canals and TM's normal, nose and mouth without ulcers or lesions  NECK: no adenopathy, no asymmetry, masses, or scars  RESP: lungs clear to auscultation - no rales, rhonchi or wheezes  CV: regular rate and rhythm, normal S1 S2, no S3 or S4, no murmur, click or rub, no peripheral edema  ABDOMEN: soft, nontender, no hepatosplenomegaly, no masses and bowel sounds normal  MS: no gross musculoskeletal defects noted, no edema  SKIN: no suspicious lesions or rashes  NEURO: Normal strength and tone, mentation intact and speech normal  PSYCH: mentation appears normal, affect normal/bright    Recent Labs   Lab Test 08/01/24  1200 05/16/24  1540 11/02/23  1218   HGB  --  13.8  --    PLT  --  233  --    NA  --  138 137   POTASSIUM  --  4.5 4.7   CR 0.9 0.88 0.78        Diagnostics  Recent Results (from the past 48 hours)   CBC with platelets    Collection Time: 10/24/24  4:41 PM   Result Value Ref Range    WBC Count 7.2 4.0 - 11.0 10e3/uL    RBC Count 4.59 3.80 - 5.20 10e6/uL    Hemoglobin 13.3 11.7 - 15.7 g/dL    Hematocrit 40.4 35.0 - 47.0 %    MCV 88 78 - 100 fL    MCH 29.0 26.5 - 33.0 pg    MCHC 32.9 31.5 - 36.5 g/dL    RDW 13.5 10.0 - 15.0 %    Platelet Count 239 150 - 450 10e3/uL      EKG: appears normal, NSR, normal axis, normal intervals, no acute ST/T changes c/w ischemia, no LVH by voltage criteria, Premature Atrial " Contractions (PAC) noted, nonspecific ST-T changes    Revised Cardiac Risk Index (RCRI)  The patient has the following serious cardiovascular risks for perioperative complications:   - No serious cardiac risks = 0 points     RCRI Interpretation: 0 points: Class I (very low risk - 0.4% complication rate)         Signed Electronically by: Heather Adams MD  A copy of this evaluation report is provided to the requesting physician.

## 2024-10-24 NOTE — PATIENT INSTRUCTIONS
How to Take Your Medication Before Surgery  Preoperative Medication Instructions   Antiplatelet or Anticoagulation Medication Instructions   - aspirin: Discontinue aspirin 5-7 days prior to procedure to reduce bleeding risk. It should be resumed postoperatively.     Additional Medication Instructions  Take all scheduled medications on the day of surgery EXCEPT for modifications listed below:  Stop Tumeric 7 days prior to surgery.   Do not take the Losartan on the night before surgery.          Patient Education   Preparing for Your Surgery  For Adults  Getting started  In most cases, a nurse will call to review your health history and instructions. They will give you an arrival time based on your scheduled surgery time. Please be ready to share:  Your doctor's clinic name and phone number  Your medical, surgical, and anesthesia history  A list of allergies and sensitivities  A list of medicines, including herbal treatments and over-the-counter drugs  Whether the patient has a legal guardian (ask how to send us the papers in advance)  Note: You may not receive a call if you were seen at our PAC (Preoperative Assessment Center).  Please tell us if you're pregnant--or if there's any chance you might be pregnant. Some surgeries may injure a fetus (unborn baby), so they require a pregnancy test. Surgeries that are safe for a fetus don't always need a test, and you can choose whether to have one.   Preparing for surgery  Within 10 to 30 days of surgery: Have a pre-op exam (sometimes called an H&P, or History and Physical). This can be done at a clinic or pre-operative center.  If you're having a , you may not need this exam. Talk to your care team.  At your pre-op exam, talk to your care team about all medicines you take. (This includes CBD oil and any drugs, such as THC, marijuana, and other forms of cannabis.) If you need to stop any medicine before surgery, ask when to start taking it again.  This is for your  safety. Many medicines and drugs can make you bleed too much during surgery. Some change how well surgery (anesthesia) drugs work.  Call your insurance company to let them know you're having surgery. (If you don't have insurance, call [phone].)  Call your clinic if there's any change in your health. This includes a scrape or scratch near the surgery site, or any signs of a cold (sore throat, runny nose, cough, rash, fever).  Eating and drinking guidelines  For your safety: Unless your surgeon tells you otherwise, follow the guidelines below.  Eat and drink as normal until 8 hours before you arrive for surgery. After that, no food or milk. You can spit out gum when you arrive.  Drink clear liquids until 2 hours before you arrive. These are liquids you can see through, like water, Gatorade, and Propel Water. They also include plain black coffee and tea (no cream or milk).  No alcohol for 24 hours before you arrive. The night before surgery, stop any drinks that contain THC.  If your care team tells you to take medicine on the morning of surgery, it's okay to take it with a sip of water. No other medicines or drugs are allowed (including CBD oil)--follow your care team's instructions.  If you have questions the day of surgery, call your hospital or surgery center.   Preventing infection  Shower or bathe the night before and the morning of surgery. Follow the instructions your clinic gave you. (If no instructions, use regular soap.)  Don't shave or clip hair near your surgery site. We'll remove the hair if needed.  Don't smoke or vape the morning of surgery. No chewing tobacco for 6 hours before you arrive. A nicotine patch is okay. You may spit out nicotine gum when you arrive.  For some surgeries, the surgeon will tell you to fully quit smoking and nicotine.  We will make every effort to keep you safe from infection. We will:  Clean our hands often with soap and water (or an alcohol-based hand rub).  Clean the  skin at your surgery site with a special soap that kills germs.  Give you a special gown to keep you warm. (Cold raises the risk of infection.)  Wear hair covers, masks, gowns, and gloves during surgery.  Give antibiotic medicine, if prescribed. Not all surgeries need this medicine.  What to bring on the day of surgery  Photo ID and insurance card  Copy of your health care directive, if you have one  Glasses and hearing aids (bring cases)  You can't wear contacts during surgery  Inhaler and eye drops, if you use them (tell us about these when you arrive)  CPAP machine or breathing device, if you use them  A few personal items, if spending the night  If you have . . .  A pacemaker, ICD (cardiac defibrillator), or other implant: Bring the ID card.  An implanted stimulator: Bring the remote control.  A legal guardian: Bring a copy of the certified (court-stamped) guardianship papers.  Please remove any jewelry, including body piercings. Leave jewelry and other valuables at home.  If you're going home the day of surgery  You must have a responsible adult drive you home. They should stay with you overnight as well.  If you don't have someone to stay with you, and you aren't safe to go home alone, we may keep you overnight. Insurance often won't pay for this.  After surgery  If it's hard to control your pain or you need more pain medicine, please call your surgeon's office.  Questions?   If you have any questions for your care team, list them here:   ____________________________________________________________________________________________________________________________________________________________________________________________________________________________________________________________  For informational purposes only. Not to replace the advice of your health care provider. Copyright   2003, 2019 [facility]. All rights reserved. Clinically reviewed by Car Jones MD. SMARTworks 907802 -  REV 08/24.

## 2024-10-26 NOTE — RESULT ENCOUNTER NOTE
Your blood cell counts are normal.  Please call or MyChart message me if you have any questions.      PSK

## 2024-10-31 ENCOUNTER — TELEPHONE (OUTPATIENT)
Dept: SURGERY | Facility: CLINIC | Age: 79
End: 2024-10-31
Payer: COMMERCIAL

## 2024-10-31 NOTE — TELEPHONE ENCOUNTER
Lena is scheduled for surgery-bilateral neck exploration, excision dual parathyroid adenomas, 11/5/24 with [facility]. She has some questions about her prep for surgery.     Please call at: [phone]  Ok to leave VM   Aware she will not receive a call until tomorrow.

## 2024-11-01 NOTE — TELEPHONE ENCOUNTER
Planned procedure: bilateral neck exploration, excision of dual parathyroid adenomas    Date: 11/5/24    Surgeon: Lashanda    Patient wanting to verify that she is supposed to drink 96 oz of fluids 30 minutes prior to arriving for surgery? She reports that she received a call yesterday and was told this    Informed her that the call likely came from the OR nursing stuff, doing their pre op call    Provided her with phone number to call for clarification of her pre operative process    Rosalind Anthony, RN-BSN

## 2024-11-05 ENCOUNTER — APPOINTMENT (OUTPATIENT)
Dept: SURGERY | Facility: PHYSICIAN GROUP | Age: 79
End: 2024-11-05
Payer: COMMERCIAL

## 2024-11-05 ENCOUNTER — HOSPITAL ENCOUNTER (OUTPATIENT)
Facility: CLINIC | Age: 79
Discharge: HOME | End: 2024-11-05
Attending: SURGERY | Admitting: SURGERY
Payer: COMMERCIAL

## 2024-11-05 ENCOUNTER — ANESTHESIA EVENT (OUTPATIENT)
Dept: SURGERY | Facility: CLINIC | Age: 79
End: 2024-11-05
Payer: COMMERCIAL

## 2024-11-05 ENCOUNTER — ANESTHESIA (OUTPATIENT)
Dept: SURGERY | Facility: CLINIC | Age: 79
End: 2024-11-05
Payer: COMMERCIAL

## 2024-11-05 VITALS
RESPIRATION RATE: 16 BRPM | BODY MASS INDEX: 39.25 KG/M2 | WEIGHT: 174.5 LBS | TEMPERATURE: 97.2 F | HEART RATE: 73 BPM | OXYGEN SATURATION: 96 % | DIASTOLIC BLOOD PRESSURE: 64 MMHG | SYSTOLIC BLOOD PRESSURE: 151 MMHG | HEIGHT: 56 IN

## 2024-11-05 DIAGNOSIS — E21.3 HYPERPARATHYROIDISM (H): ICD-10-CM

## 2024-11-05 DIAGNOSIS — Z98.890 S/P PARATHYROIDECTOMY: ICD-10-CM

## 2024-11-05 DIAGNOSIS — Z90.89 S/P PARATHYROIDECTOMY: ICD-10-CM

## 2024-11-05 DIAGNOSIS — G89.18 POSTOPERATIVE PAIN: Primary | ICD-10-CM

## 2024-11-05 LAB
PTH-INTACT SERPL-MCNC: 101 PG/ML (ref 15–65)
PTH-INTACT SERPL-MCNC: 142 PG/ML (ref 15–65)
PTH-INTACT SERPL-MCNC: 145 PG/ML (ref 15–65)
PTH-INTACT SERPL-MCNC: 33 PG/ML (ref 15–65)

## 2024-11-05 PROCEDURE — 250N000025 HC SEVOFLURANE, PER MIN: Performed by: SURGERY

## 2024-11-05 PROCEDURE — 250N000009 HC RX 250: Performed by: NURSE ANESTHETIST, CERTIFIED REGISTERED

## 2024-11-05 PROCEDURE — 250N000009 HC RX 250: Performed by: SURGERY

## 2024-11-05 PROCEDURE — 60500 EXPLORE PARATHYROID GLANDS: CPT | Performed by: ANESTHESIOLOGY

## 2024-11-05 PROCEDURE — 710N000012 HC RECOVERY PHASE 2, PER MINUTE: Performed by: SURGERY

## 2024-11-05 PROCEDURE — 370N000017 HC ANESTHESIA TECHNICAL FEE, PER MIN: Performed by: SURGERY

## 2024-11-05 PROCEDURE — 83970 ASSAY OF PARATHORMONE: CPT | Performed by: PHYSICIAN ASSISTANT

## 2024-11-05 PROCEDURE — 250N000011 HC RX IP 250 OP 636: Performed by: ANESTHESIOLOGY

## 2024-11-05 PROCEDURE — 250N000011 HC RX IP 250 OP 636: Performed by: NURSE ANESTHETIST, CERTIFIED REGISTERED

## 2024-11-05 PROCEDURE — 272N000001 HC OR GENERAL SUPPLY STERILE: Performed by: SURGERY

## 2024-11-05 PROCEDURE — 258N000003 HC RX IP 258 OP 636: Performed by: NURSE ANESTHETIST, CERTIFIED REGISTERED

## 2024-11-05 PROCEDURE — 250N000011 HC RX IP 250 OP 636: Performed by: SURGERY

## 2024-11-05 PROCEDURE — 99100 ANES PT EXTEME AGE<1 YR&>70: CPT | Performed by: NURSE ANESTHETIST, CERTIFIED REGISTERED

## 2024-11-05 PROCEDURE — 360N000077 HC SURGERY LEVEL 4, PER MIN: Performed by: SURGERY

## 2024-11-05 PROCEDURE — 83970 ASSAY OF PARATHORMONE: CPT | Performed by: SURGERY

## 2024-11-05 PROCEDURE — 999N000141 HC STATISTIC PRE-PROCEDURE NURSING ASSESSMENT: Performed by: SURGERY

## 2024-11-05 PROCEDURE — 250N000013 HC RX MED GY IP 250 OP 250 PS 637: Performed by: PHYSICIAN ASSISTANT

## 2024-11-05 PROCEDURE — 88331 PATH CONSLTJ SURG 1 BLK 1SPC: CPT | Mod: TC | Performed by: SURGERY

## 2024-11-05 PROCEDURE — 36415 COLL VENOUS BLD VENIPUNCTURE: CPT | Performed by: SURGERY

## 2024-11-05 PROCEDURE — 250N000011 HC RX IP 250 OP 636

## 2024-11-05 PROCEDURE — 36415 COLL VENOUS BLD VENIPUNCTURE: CPT | Performed by: PHYSICIAN ASSISTANT

## 2024-11-05 PROCEDURE — 710N000009 HC RECOVERY PHASE 1, LEVEL 1, PER MIN: Performed by: SURGERY

## 2024-11-05 PROCEDURE — 60500 EXPLORE PARATHYROID GLANDS: CPT | Performed by: NURSE ANESTHETIST, CERTIFIED REGISTERED

## 2024-11-05 RX ORDER — ONDANSETRON 4 MG/1
4 TABLET, ORALLY DISINTEGRATING ORAL EVERY 30 MIN PRN
Status: DISCONTINUED | OUTPATIENT
Start: 2024-11-05 | End: 2024-11-05 | Stop reason: HOSPADM

## 2024-11-05 RX ORDER — EPHEDRINE SULFATE 50 MG/ML
INJECTION, SOLUTION INTRAMUSCULAR; INTRAVENOUS; SUBCUTANEOUS PRN
Status: DISCONTINUED | OUTPATIENT
Start: 2024-11-05 | End: 2024-11-05

## 2024-11-05 RX ORDER — CEFAZOLIN SODIUM/WATER 2 G/20 ML
2 SYRINGE (ML) INTRAVENOUS
Status: COMPLETED | OUTPATIENT
Start: 2024-11-05 | End: 2024-11-05

## 2024-11-05 RX ORDER — PROPOFOL 10 MG/ML
INJECTION, EMULSION INTRAVENOUS CONTINUOUS PRN
Status: DISCONTINUED | OUTPATIENT
Start: 2024-11-05 | End: 2024-11-05

## 2024-11-05 RX ORDER — SODIUM CHLORIDE, SODIUM LACTATE, POTASSIUM CHLORIDE, CALCIUM CHLORIDE 600; 310; 30; 20 MG/100ML; MG/100ML; MG/100ML; MG/100ML
INJECTION, SOLUTION INTRAVENOUS CONTINUOUS PRN
Status: DISCONTINUED | OUTPATIENT
Start: 2024-11-05 | End: 2024-11-05

## 2024-11-05 RX ORDER — BUPIVACAINE HYDROCHLORIDE AND EPINEPHRINE 2.5; 5 MG/ML; UG/ML
INJECTION, SOLUTION INFILTRATION; PERINEURAL PRN
Status: DISCONTINUED | OUTPATIENT
Start: 2024-11-05 | End: 2024-11-05 | Stop reason: HOSPADM

## 2024-11-05 RX ORDER — CEFAZOLIN SODIUM/WATER 2 G/20 ML
2 SYRINGE (ML) INTRAVENOUS SEE ADMIN INSTRUCTIONS
Status: DISCONTINUED | OUTPATIENT
Start: 2024-11-05 | End: 2024-11-05 | Stop reason: HOSPADM

## 2024-11-05 RX ORDER — FENTANYL CITRATE 50 UG/ML
50 INJECTION, SOLUTION INTRAMUSCULAR; INTRAVENOUS EVERY 5 MIN PRN
Status: DISCONTINUED | OUTPATIENT
Start: 2024-11-05 | End: 2024-11-05 | Stop reason: HOSPADM

## 2024-11-05 RX ORDER — PROPOFOL 10 MG/ML
INJECTION, EMULSION INTRAVENOUS PRN
Status: DISCONTINUED | OUTPATIENT
Start: 2024-11-05 | End: 2024-11-05

## 2024-11-05 RX ORDER — HYDROMORPHONE HCL IN WATER/PF 6 MG/30 ML
0.2 PATIENT CONTROLLED ANALGESIA SYRINGE INTRAVENOUS EVERY 5 MIN PRN
Status: DISCONTINUED | OUTPATIENT
Start: 2024-11-05 | End: 2024-11-05 | Stop reason: HOSPADM

## 2024-11-05 RX ORDER — BUPIVACAINE HYDROCHLORIDE AND EPINEPHRINE 2.5; 5 MG/ML; UG/ML
INJECTION, SOLUTION EPIDURAL; INFILTRATION; INTRACAUDAL; PERINEURAL
Status: DISCONTINUED
Start: 2024-11-05 | End: 2024-11-05 | Stop reason: HOSPADM

## 2024-11-05 RX ORDER — FENTANYL CITRATE 50 UG/ML
INJECTION, SOLUTION INTRAMUSCULAR; INTRAVENOUS PRN
Status: DISCONTINUED | OUTPATIENT
Start: 2024-11-05 | End: 2024-11-05

## 2024-11-05 RX ORDER — FENTANYL CITRATE 50 UG/ML
25 INJECTION, SOLUTION INTRAMUSCULAR; INTRAVENOUS EVERY 5 MIN PRN
Status: DISCONTINUED | OUTPATIENT
Start: 2024-11-05 | End: 2024-11-05 | Stop reason: HOSPADM

## 2024-11-05 RX ORDER — MAGNESIUM HYDROXIDE 1200 MG/15ML
LIQUID ORAL PRN
Status: DISCONTINUED | OUTPATIENT
Start: 2024-11-05 | End: 2024-11-05 | Stop reason: HOSPADM

## 2024-11-05 RX ORDER — ONDANSETRON 2 MG/ML
4 INJECTION INTRAMUSCULAR; INTRAVENOUS EVERY 30 MIN PRN
Status: DISCONTINUED | OUTPATIENT
Start: 2024-11-05 | End: 2024-11-05 | Stop reason: HOSPADM

## 2024-11-05 RX ORDER — HYDROCODONE BITARTRATE AND ACETAMINOPHEN 5; 325 MG/1; MG/1
1 TABLET ORAL EVERY 4 HOURS PRN
Qty: 6 TABLET | Refills: 0 | Status: SHIPPED | OUTPATIENT
Start: 2024-11-05

## 2024-11-05 RX ORDER — NALOXONE HYDROCHLORIDE 0.4 MG/ML
0.1 INJECTION, SOLUTION INTRAMUSCULAR; INTRAVENOUS; SUBCUTANEOUS
Status: DISCONTINUED | OUTPATIENT
Start: 2024-11-05 | End: 2024-11-05 | Stop reason: HOSPADM

## 2024-11-05 RX ORDER — LIDOCAINE HYDROCHLORIDE 20 MG/ML
INJECTION, SOLUTION INFILTRATION; PERINEURAL PRN
Status: DISCONTINUED | OUTPATIENT
Start: 2024-11-05 | End: 2024-11-05

## 2024-11-05 RX ORDER — HYDROCODONE BITARTRATE AND ACETAMINOPHEN 5; 325 MG/1; MG/1
1 TABLET ORAL
Status: COMPLETED | OUTPATIENT
Start: 2024-11-05 | End: 2024-11-05

## 2024-11-05 RX ORDER — SODIUM CHLORIDE, SODIUM LACTATE, POTASSIUM CHLORIDE, CALCIUM CHLORIDE 600; 310; 30; 20 MG/100ML; MG/100ML; MG/100ML; MG/100ML
INJECTION, SOLUTION INTRAVENOUS CONTINUOUS
Status: DISCONTINUED | OUTPATIENT
Start: 2024-11-05 | End: 2024-11-05 | Stop reason: HOSPADM

## 2024-11-05 RX ORDER — GLYCOPYRROLATE 0.2 MG/ML
INJECTION, SOLUTION INTRAMUSCULAR; INTRAVENOUS PRN
Status: DISCONTINUED | OUTPATIENT
Start: 2024-11-05 | End: 2024-11-05

## 2024-11-05 RX ORDER — HYDROMORPHONE HCL IN WATER/PF 6 MG/30 ML
0.4 PATIENT CONTROLLED ANALGESIA SYRINGE INTRAVENOUS EVERY 5 MIN PRN
Status: DISCONTINUED | OUTPATIENT
Start: 2024-11-05 | End: 2024-11-05 | Stop reason: HOSPADM

## 2024-11-05 RX ORDER — AMOXICILLIN 250 MG
1-2 CAPSULE ORAL 2 TIMES DAILY PRN
Qty: 20 TABLET | Refills: 0 | Status: SHIPPED | OUTPATIENT
Start: 2024-11-05

## 2024-11-05 RX ORDER — DEXAMETHASONE SODIUM PHOSPHATE 4 MG/ML
4 INJECTION, SOLUTION INTRA-ARTICULAR; INTRALESIONAL; INTRAMUSCULAR; INTRAVENOUS; SOFT TISSUE
Status: DISCONTINUED | OUTPATIENT
Start: 2024-11-05 | End: 2024-11-05 | Stop reason: HOSPADM

## 2024-11-05 RX ORDER — ONDANSETRON 2 MG/ML
INJECTION INTRAMUSCULAR; INTRAVENOUS PRN
Status: DISCONTINUED | OUTPATIENT
Start: 2024-11-05 | End: 2024-11-05

## 2024-11-05 RX ADMIN — GLYCOPYRROLATE 0.1 MG: 0.2 INJECTION, SOLUTION INTRAMUSCULAR; INTRAVENOUS at 12:36

## 2024-11-05 RX ADMIN — Medication 5 MG: at 12:33

## 2024-11-05 RX ADMIN — Medication 5 MG: at 12:41

## 2024-11-05 RX ADMIN — Medication 5 MG: at 14:01

## 2024-11-05 RX ADMIN — HYDROMORPHONE HYDROCHLORIDE 0.5 MG: 1 INJECTION, SOLUTION INTRAMUSCULAR; INTRAVENOUS; SUBCUTANEOUS at 15:11

## 2024-11-05 RX ADMIN — Medication 2 G: at 12:09

## 2024-11-05 RX ADMIN — PROPOFOL 50 MCG/KG/MIN: 10 INJECTION, EMULSION INTRAVENOUS at 12:12

## 2024-11-05 RX ADMIN — LIDOCAINE HYDROCHLORIDE 100 MG: 20 INJECTION, SOLUTION INFILTRATION; PERINEURAL at 12:12

## 2024-11-05 RX ADMIN — SODIUM CHLORIDE, POTASSIUM CHLORIDE, SODIUM LACTATE AND CALCIUM CHLORIDE: 600; 310; 30; 20 INJECTION, SOLUTION INTRAVENOUS at 12:09

## 2024-11-05 RX ADMIN — FENTANYL CITRATE 50 MCG: 50 INJECTION INTRAMUSCULAR; INTRAVENOUS at 12:12

## 2024-11-05 RX ADMIN — REMIFENTANIL HYDROCHLORIDE 0.1 MCG/KG/MIN: 1 INJECTION, POWDER, LYOPHILIZED, FOR SOLUTION INTRAVENOUS at 12:12

## 2024-11-05 RX ADMIN — SUCCINYLCHOLINE CHLORIDE 140 MG: 20 INJECTION, SOLUTION INTRAMUSCULAR; INTRAVENOUS; PARENTERAL at 12:13

## 2024-11-05 RX ADMIN — PROPOFOL 200 MG: 10 INJECTION, EMULSION INTRAVENOUS at 12:12

## 2024-11-05 RX ADMIN — HYDROCODONE BITARTRATE AND ACETAMINOPHEN 1 TABLET: 5; 325 TABLET ORAL at 16:37

## 2024-11-05 RX ADMIN — GLYCOPYRROLATE 0.1 MG: 0.2 INJECTION, SOLUTION INTRAMUSCULAR; INTRAVENOUS at 12:39

## 2024-11-05 RX ADMIN — HYDROMORPHONE HYDROCHLORIDE 0.2 MG: 0.2 INJECTION, SOLUTION INTRAMUSCULAR; INTRAVENOUS; SUBCUTANEOUS at 16:08

## 2024-11-05 RX ADMIN — FENTANYL CITRATE 50 MCG: 50 INJECTION INTRAMUSCULAR; INTRAVENOUS at 14:11

## 2024-11-05 RX ADMIN — Medication 5 MG: at 13:30

## 2024-11-05 RX ADMIN — Medication 5 MG: at 13:18

## 2024-11-05 RX ADMIN — PHENYLEPHRINE HYDROCHLORIDE 0.5 MCG/KG/MIN: 10 INJECTION INTRAVENOUS at 12:18

## 2024-11-05 RX ADMIN — ONDANSETRON 4 MG: 2 INJECTION INTRAMUSCULAR; INTRAVENOUS at 13:30

## 2024-11-05 ASSESSMENT — ACTIVITIES OF DAILY LIVING (ADL)
ADLS_ACUITY_SCORE: 0

## 2024-11-05 NOTE — ANESTHESIA PROCEDURE NOTES
Airway       Patient location during procedure: OR       Procedure Start/Stop Times: 11/5/2024 12:15 PM  Staff -        Anesthesiologist:  Machelle Monsivais MD       CRNA: Nicole Guevara APRN CRNA       Performed By: CRNA  Consent for Airway        Urgency: elective  Indications and Patient Condition       Indications for airway management: mustapha-procedural       Induction type:RSI       Mask difficulty assessment: 0 - not attempted    Final Airway Details       Final airway type: endotracheal airway       Successful airway: NIM  Endotracheal Airway Details        ETT size (mm): 6.0       Cuffed: yes       Successful intubation technique: video laryngoscopy       VL Blade Size: Glidescope 3       Grade View of Cords: 1       Adjucts: stylet       Position: Center       Measured from: gums/teeth       Secured at (cm): 21       Bite block used: None    Post intubation assessment        Placement verified by: capnometry, equal breath sounds and chest rise        Number of attempts at approach: 1       Number of other approaches attempted: 0       Secured with: tape       Ease of procedure: easy       Dentition: Intact and Unchanged    Medication(s) Administered   Medication Administration Time: 11/5/2024 12:15 PM

## 2024-11-05 NOTE — ANESTHESIA PREPROCEDURE EVALUATION
Anesthesia Pre-Procedure Evaluation    Patient: Monica Samayoa   MRN: [id number] : 1945        Procedure : Procedure(s):  Bilateral neck exploration  excision dual parathyroid adenomas          Past Medical History:   Diagnosis Date    Allergy     Dr. Murillo    Ankle fracture     left has pins    Arm pain     ortho    Arthritis     Atypical chest pain     Dr. jeff ferguson [facility]    [place]     Heart murmur     Hyperlipidemia LDL goal < 100     vytorin    Hypertension     had stress test with cardiology ?    SADIE (iron deficiency anaemia)     Malignant neoplasm (H)     breast cancer    MEDICAL HISTORY OF -     ?leaky valve use to get SBE prophylaxsis    Precancerous skin lesion     Travel       Past Surgical History:   Procedure Laterality Date    BIOPSY  2010    left breast cancer    BREAST SURGERY      lumpectomy    COLONOSCOPY  2013    ORTHOPEDIC SURGERY      left ankle, fibula    REPAIR ECTROPION BILATERAL  8/15/12    REPAIR ECTROPION BILATERAL  2013    Procedure: REPAIR ECTROPION BILATERAL;  BILATERAL LOWER EYELID ECTROPION REPAIR;  Surgeon: Neel Latham MD;  Location: [facility]    SURGICAL HISTORY OF -       left ankle pins      Allergies   Allergen Reactions    Animal Dander Other (See Comments)     Cats-gets watery eyes    Dust Mites     Grass Unknown    Pollen Extract Unknown    Seasonal Allergies Nausea     Congested   Pollen tree grass cats  Had shots in past      Social History     Tobacco Use    Smoking status: Never     Passive exposure: Never    Smokeless tobacco: Never   Substance Use Topics    Alcohol use: Yes     Comment: 1-2  glasses of wine a month      Wt Readings from Last 1 Encounters:   10/24/24 78.9 kg (174 lb)        Anesthesia Evaluation   Pt has had prior anesthetic.     No history of anesthetic complications       ROS/MED HX  ENT/Pulmonary:    (-) sleep apnea   Neurologic:  - neg neurologic ROS     Cardiovascular:     (+) Dyslipidemia hypertension-  "-   -  - -                                      METS/Exercise Tolerance:     Hematologic:  - neg hematologic  ROS     Musculoskeletal: Comment: osteopenia  (+)  arthritis,             GI/Hepatic:    (-) GERD   Renal/Genitourinary:  - neg Renal ROS     Endo: Comment: hyperparathyroidism    (+)               Obesity,       Psychiatric/Substance Use:       Infectious Disease:       Malignancy:   (+) Malignancy, History of Breast.    Other:            Physical Exam    Airway        Mallampati: III   TM distance: > 3 FB   Neck ROM: full   Mouth opening: > 3 cm    Respiratory Devices and Support         Dental       (+) Modest Abnormalities - crowns, retainers, 1 or 2 missing teeth      Cardiovascular   cardiovascular exam normal          Pulmonary   pulmonary exam normal                OUTSIDE LABS:  CBC:   Lab Results   Component Value Date    WBC 7.2 10/24/2024    WBC 7.5 05/16/2024    HGB 13.3 10/24/2024    HGB 13.8 05/16/2024    HCT 40.4 10/24/2024    HCT 41.1 05/16/2024     10/24/2024     05/16/2024     BMP:   Lab Results   Component Value Date     05/16/2024     11/02/2023    POTASSIUM 4.5 05/16/2024    POTASSIUM 4.7 11/02/2023    CHLORIDE 106 05/16/2024    CHLORIDE 104 11/02/2023    CO2 20 (L) 05/16/2024    CO2 22 11/02/2023    BUN 13.6 05/16/2024    BUN 14.6 11/02/2023    CR 0.9 08/01/2024    CR 0.88 05/16/2024     (H) 05/16/2024     (H) 11/02/2023     COAGS: No results found for: \"PTT\", \"INR\", \"FIBR\"  POC: No results found for: \"BGM\", \"HCG\", \"HCGS\"  HEPATIC:   Lab Results   Component Value Date    ALBUMIN 4.6 05/16/2024    PROTTOTAL 7.2 05/16/2024    ALT 28 05/16/2024    AST 31 05/16/2024    ALKPHOS 55 05/16/2024    BILITOTAL 0.5 05/16/2024     OTHER:   Lab Results   Component Value Date    DESTINEE 11.0 (H) 05/16/2024    PHOS 2.2 (L) 11/02/2023    TSH 1.41 05/16/2024    T4 1.16 08/28/2020    T3 129 08/28/2014       Anesthesia Plan    ASA Status:  2    NPO Status:  NPO status:: " "drank liquids until 10 am.   Anesthesia Type: General.     - Airway: ETT   Induction: Intravenous, Propofol.   Maintenance: TIVA.        Consents    Anesthesia Plan(s) and associated risks, benefits, and realistic alternatives discussed. Questions answered and patient/representative(s) expressed understanding.     - Discussed:     - Discussed with:  Patient            Postoperative Care    Pain management: IV analgesics.   PONV prophylaxis: Ondansetron (or other 5HT-3), Background Propofol Infusion     Comments:               Machelle Monsivais MD    I have reviewed the pertinent notes and labs in the chart from the past 30 days and (re)examined the patient.  Any updates or changes from those notes are reflected in this note.               # Hypertension: Noted on problem list           # Obesity: Estimated body mass index is 39.36 kg/m  as calculated from the following:    Height as of 10/24/24: 1.416 m (4' 7.75\").    Weight as of 10/24/24: 78.9 kg (174 lb).             "

## 2024-11-05 NOTE — BRIEF OP NOTE
[facility]    Brief Operative Note    Pre-operative diagnosis: Hyperparathyroidism (H) [E21.3]  Post-operative diagnosis Same as pre-operative diagnosis    Procedure: Bilateral neck exploration, Bilateral - Neck  excision dual parathyroid adenomas, Bilateral - Neck    Surgeon: Surgeons and Role:     * Jamir Pyle MD - Primary     * Diana Wilson PA-C - Assisting  Anesthesia: General   Estimated Blood Loss: Minimal, see Op Note  Drains: None  Specimens:   ID Type Source Tests Collected by Time Destination   1 : left superior neck nodule Tissue Neck, Left SURGICAL PATHOLOGY EXAM Jamir Pyle MD 11/5/2024  1:24 PM    2 : right inferior neck nodule Tissue Neck, Right SURGICAL PATHOLOGY EXAM Jamir Pyle MD 11/5/2024  1:33 PM    3 : right superior neck nodule Tissue Neck, Right SURGICAL PATHOLOGY EXAM Jamir Pyle MD 11/5/2024  1:39 PM    4 : left inferior neck nodule Tissue Neck, Left SURGICAL PATHOLOGY EXAM Jamir Pyle MD 11/5/2024  1:50 PM    5 : left central neck Tissue Neck, Left SURGICAL PATHOLOGY EXAM Jamir Pyle MD 11/5/2024  2:50 PM    A : PTHI Blood Line, Other PARATHYROID HORMONE INTACT Jamir Pyle MD 11/5/2024  1:03 PM    B : PTHI Blood Line, Other PARATHYROID HORMONE INTACT Jamir Pyle MD 11/5/2024  2:04 PM      Findings:   Enlarged bilateral superior parathyroid glands excised. Half of right inferior parathyroid gland excised. PTH progression 142->145->101. Recheck planned for PACU. Extensive left and central neck dissection and no enlarged parathyroid gland found .  **ADDENDUM** PTH dropped appropriately to 33. 500 mg BID sent.  Complications: None.  Implants: * No implants in log *

## 2024-11-05 NOTE — ANESTHESIA POSTPROCEDURE EVALUATION
Patient: Monica Samayoa    Procedure: Procedure(s):  Bilateral neck exploration  excision dual parathyroid adenomas       Anesthesia Type:  General    Note:     Postop Pain Control: Uneventful            Sign Out: Well controlled pain   PONV: No   Neuro/Psych: Uneventful            Sign Out: Acceptable/Baseline neuro status   Airway/Respiratory: Uneventful            Sign Out: Acceptable/Baseline resp. status   CV/Hemodynamics: Uneventful            Sign Out: Acceptable CV status; No obvious hypovolemia; No obvious fluid overload   Other NRE: NONE   DID A NON-ROUTINE EVENT OCCUR? No           Last vitals:  Vitals Value Taken Time   /82 11/05/24 1530   Temp 37.2  C (99  F) 11/05/24 1526   Pulse 92 11/05/24 1532   Resp 20 11/05/24 1532   SpO2 98 % 11/05/24 1532   Vitals shown include unfiled device data.    Electronically Signed By: Machelle Monsivais MD  November 5, 2024  3:33 PM

## 2024-11-05 NOTE — ANESTHESIA CARE TRANSFER NOTE
Patient: Monica Samayoa    Procedure: Procedure(s):  Bilateral neck exploration  excision dual parathyroid adenomas       Diagnosis: Hyperparathyroidism (H) [E21.3]  Diagnosis Additional Information: No value filed.    Anesthesia Type:   General     Note:    Oropharynx: oropharynx clear of all foreign objects and spontaneously breathing  Level of Consciousness: awake  Oxygen Supplementation: face mask  Level of Supplemental Oxygen (L/min / FiO2): 6  Independent Airway: airway patency satisfactory and stable  Dentition: dentition unchanged  Vital Signs Stable: post-procedure vital signs reviewed and stable  Report to RN Given: handoff report given  Patient transferred to: Phase II    Handoff Report: Identifed the Patient, Identified the Reponsible Provider, Reviewed the pertinent medical history, Discussed the surgical course, Reviewed Intra-OP anesthesia mangement and issues during anesthesia, Set expectations for post-procedure period and Allowed opportunity for questions and acknowledgement of understanding      Vitals:  Vitals Value Taken Time   /82 11/05/24 1530   Temp     Pulse 94 11/05/24 1531   Resp 19 11/05/24 1531   SpO2 98 % 11/05/24 1531   Vitals shown include unfiled device data.    Electronically Signed By: DINO Arreguin CRNA  November 5, 2024  3:33 PM

## 2024-11-05 NOTE — DISCHARGE INSTRUCTIONS
[facility]  Discharge Instructions: Post-Operative Parathyroid Surgery    ACTIVITY  Take frequent, short walks and increase your activity gradually.    Avoid strenuous physical activity or heavy lifting greater than 15-20 lbs. for 1-2 weeks.  You may climb stairs.  You may drive without restrictions when you are not using any prescription pain medication and feel comfortable in a car.  You may return to work/school when you are comfortable without any prescription pain medication.    WOUND CARE  You may remove your bandage and shower 48 hours after the surgery.  Pat your incision dry and leave it open to air.  Re-apply dressing (Band-Aids or gauze/tape) as needed for comfort or drainage.  You may have steri-strips (looks like white tape) on your incision.  You may peel off the steri-strips 2 weeks after your surgery if they have not peeled off on their own.  Do not soak your incision in a tub or pool for 2 weeks.   Do not apply any lotions, creams, or ointments to your incision.  A ridge under your incision is normal and will gradually resolve.    DIET  Start with liquids, then gradually resume your regular diet as tolerated.    Drink plenty of fluids to stay hydrated.    PAIN  Expect some tenderness and discomfort at the incision site(s).  Use the prescribed pain medication at your discretion.  Expect gradual resolution of your pain over several days.  You may take ibuprofen with food (unless you have been told not to) or acetaminophen/Tylenol instead of or in addition to your prescribed pain medication.  However, if you are taking Norco, do not take any additional acetaminophen/Tylenol.  Do not drink alcohol or drive while you are taking pain medications.  You may apply ice to your incisions in 20 minute intervals as needed for the next 48 hours.  After that time, consider switching to heat if you prefer.    EXPECTATIONS  Take your prescribed Calcium supplement until your post-op  appointment.  You were prescribed 500 mg to take twice a day.  Watch for symptoms of numbness or tingling around the mouth or in the fingers or toes.  This may be a sign of a low calcium level.  This can be treated with taking extra calcium found in Tums.  Take 2-3 Tums right away and contact our office so we can evaluate your symptoms.  You may need to have your blood calcium level checked by doing a simple blood test.  Bruising may occur in middle of your chest or at the tops of your feet.  This is due to lead placement for the nerve monitor and blood draws respectively.  If bruising occurs, it will resolve with time.    Pain medications can cause constipation.  Limit use when possible.  Take an over the counter or prescribed stool softener/stimulant, such as Colace or Senna, 1-2 times a day with plenty of water.  You may take a mild over the counter laxative, such as Miralax or a suppository, as needed.    You may discontinue these medications once you are having regular bowel movements and/or are no longer taking your narcotic pain medication.    RETURN APPOINTMENT  Follow up with your surgeon in 2-3 weeks.  Please call our office at [phone] to schedule your appointment.  We are located at [address] [place] [address].    CALL OUR OFFICE -[phone] IF YOU HAVE:   Chills or fever above 101 F.  Increased redness, warmth, or drainage at your incisions.  Significant bleeding.  Pain not relieved by your pain medication or rest.  Increasing pain after the first 48 hours.  Any other concerns or questions.             Revised January 2023  Same Day Surgery Discharge Instructions for  Sedation and General Anesthesia     It's not unusual to feel dizzy, light-headed or faint for up to 24 hours after surgery or while taking pain medication.  If you have these symptoms: sit for a few minutes before standing and have someone assist you when you get up to walk or use the bathroom.    You should  rest and relax for the next 24 hours. We recommend you make arrangements to have an adult stay with you for at least 24 hours after your discharge.  Avoid hazardous and strenuous activity.    DO NOT DRIVE any vehicle or operate mechanical equipment for 24 hours following the end of your surgery.  Even though you may feel normal, your reactions may be affected by the medication you have received.    Do not drink alcoholic beverages for 24 hours following surgery.     Slowly progress to your regular diet as you feel able. It's not unusual to feel nauseated and/or vomit after receiving anesthesia.  If you develop these symptoms, drink clear liquids (apple juice, ginger ale, broth, 7-up, etc. ) until you feel better.  If your nausea and vomiting persists for 24 hours, please notify your surgeon.      All narcotic pain medications, along with inactivity and anesthesia, can cause constipation. Drinking plenty of liquids and increasing fiber intake will help.    For any questions of a medical nature, call your surgeon.    Do not make important decisions for 24 hours.    If you had general anesthesia, you may have a sore throat for a couple of days related to the breathing tube used during surgery.  You may use Cepacol lozenges to help with this discomfort.  If it worsens or if you develop a fever, contact your surgeon.     If you feel your pain is not well managed with the pain medications prescribed by your surgeon, please contact your surgeon's office to let them know so they can address your concerns.      **If you have concerns or questions about your procedure,    please contact Dr Pyle at  [phone]**

## 2024-11-06 NOTE — OP NOTE
General Surgery Operative Note    PREOPERATIVE DIAGNOSIS:  Primary hyperparathyroidism.    POSTOPERATIVE DIAGNOSIS: 4 gland parathyroid hyperplasia    PROCEDURE:     Bilateral neck exploration, excision of 2-1/2 hyperplastic parathyroid glands    ANESTHESIA:  General.    PREOPERATIVE MEDICATIONS:  Ancef IV.    SURGEON:  Jamir Pyle MD, MD    ASSISTANT:  Diana Wilson PA-C.  First assistant was necessary due to challenging exposure and the need for improved visualization and help maintaining hemostasis.      ESTIMATED BLOOD LOSS: 20 mL cc's    INDICATIONS:  Monica Samayoa is a 79 year old female who has primary hyperparathyroidism. She has had symptoms of bone pain.  She has been found to have an elevated calcium of 10.8.  She has a localizing imaging scan which suggests bilateral parathyroid disease.  She presents today for neck exploration, excision of parathyroid adenoma.  Her PTH preoperatively is 142.    DESCRIPTION OF PROCEDURE:  The patient was placed supine, head and neck in extension and a bump between the scapulae.  Transverse cervical neck creases had been marked in the preinduction area and the one most suitable was utilized for exposure.  Incision was made, superior and inferior skin flaps raised.  Midline fascia opened and reflected to the right.  An abnormal parathyroid was identified at the right superior location.  It was somewhat fatty but clearly hypercellular.  It was definitely enlarged and I continued exploring inferiorly.  We encountered the recurrent laryngeal nerve which was confirmed with the nerve monitor and preserved.  Just inferior to this we found the inferior parathyroid gland which was enlarged but somewhat smaller than the superior gland.  As both glands were enlarged on this right side, I was proceeding with a presumptive diagnosis of parathyroid hyperplasia.  Exploring the left side, we immediately encountered the left superior gland which was obviously enlarged,  similar in size to the glands on the right.  The left inferior gland was more difficult to find a we did feel that there was a probable gland on the left that was mildly enlarged.  I then elected to remove the left superior and right superior parathyroid glands and these were confirmed as hypercellular parathyroid tissue on frozen.  I removed two thirds of the right inferior parathyroid gland and left it on a well-vascularized pedicle.  We sent off our PTH at this point and the value came back minimally reduced at 101.  I continued exploring on the left, suspecting that I had not definitively identified the gland on that side.  We removed the soft tissue in the central compartment including the superior mediastinal tissue.  Once I had confirmed that there was not an enlarged gland in this area, we marked the right inferior parathyroid gland which had been biopsied and closed the neck.  A running 2-0 Vicryl was used for the strap muscles.   The patient's incision site was then closed with running 3-0 Vicryl for the midline fascia, interrupted for platysma and 4-0 subcuticular Monocryl for skin.  Marcaine 0.25% plain was instilled and the patient transferred to recovery in good condition.    INTRAOPERATIVE FINDINGS:  1.  4 gland parathyroid hyperplasia.  Right superior, left superior, and portion of right inferior gland removed.  2.  Left inferior parathyroid gland suspected but not definitively identified.  3.  PTH assay diminished from 142 to 33 in the PACU  4.  Grossly normal thyroid.    Specimens:   ID Type Source Tests Collected by Time Destination   1 : left superior neck nodule Tissue Neck, Left SURGICAL PATHOLOGY EXAM Jamir Pyle MD 11/5/2024  1:24 PM    2 : right inferior neck nodule Tissue Neck, Right SURGICAL PATHOLOGY EXAM Jamir Pyle MD 11/5/2024  1:33 PM    3 : right superior neck nodule Tissue Neck, Right SURGICAL PATHOLOGY EXAM Jamir Pyle MD 11/5/2024  1:39 PM    4 :  left inferior neck nodule Tissue Neck, Left SURGICAL PATHOLOGY EXAM Jamir Pyle MD 11/5/2024  1:50 PM    5 : left central neck Tissue Neck, Left SURGICAL PATHOLOGY EXAM Jamir Pyle MD 11/5/2024  2:50 PM    A : PTHI Blood Line, Other PARATHYROID HORMONE INTACT Jamir Pyle MD 11/5/2024  1:03 PM    B : PTHI Blood Line, Other PARATHYROID HORMONE INTACT Jamir Pyle MD 11/5/2024  2:04 PM        Jamir Pyle MD, MD

## 2024-11-08 ENCOUNTER — TELEPHONE (OUTPATIENT)
Dept: SURGERY | Facility: CLINIC | Age: 79
End: 2024-11-08
Payer: COMMERCIAL

## 2024-11-08 NOTE — TELEPHONE ENCOUNTER
Called patient and informed her that it is okay to use calcium she has at home, as long as she is getting ~ 1000 mg per day    She verbalized understanding of this    She reports that she has been feeling slight dizziness today. Informed her could be anesthesia/medications leaving her system. Encouraged hydration and slow movements. Call if symptoms get worse    She is in agreement with this plan    She will call PRN    Rosalind Anthony RN-BSN

## 2024-11-08 NOTE — TELEPHONE ENCOUNTER
Name of caller: Patient    Reason for Call:  Medication Question  Monica was sent home with:    calcium carbonate-vitamin D (OSCAL) 500-5 MG-MCG tablet   Wondering if she can use some calcium she has at home when she runs out? Or does she need a refill?     Please call to advise     Surgeon:  Jamir Pyle MD    Recent Surgery:  Yes.    If yes, when & what type:  11/5/2024      excision dual parathyroid adenomas     Best phone number to reach pt at is: [phone]    Ok to leave a message with medical info? Yes.

## 2024-11-11 LAB
PATH REPORT.COMMENTS IMP SPEC: NORMAL
PATH REPORT.FINAL DX SPEC: NORMAL
PATH REPORT.GROSS SPEC: NORMAL
PATH REPORT.INTRAOP OBS SPEC DOC: NORMAL
PATH REPORT.MICROSCOPIC SPEC OTHER STN: NORMAL
PATH REPORT.RELEVANT HX SPEC: NORMAL
PHOTO IMAGE: NORMAL

## 2024-11-11 PROCEDURE — 88305 TISSUE EXAM BY PATHOLOGIST: CPT | Mod: 26 | Performed by: PATHOLOGY

## 2024-11-11 PROCEDURE — 88331 PATH CONSLTJ SURG 1 BLK 1SPC: CPT | Mod: 26 | Performed by: PATHOLOGY

## 2024-11-11 PROCEDURE — 88307 TISSUE EXAM BY PATHOLOGIST: CPT | Mod: 26 | Performed by: PATHOLOGY

## 2024-11-12 ENCOUNTER — TELEPHONE (OUTPATIENT)
Dept: SURGERY | Facility: CLINIC | Age: 79
End: 2024-11-12
Payer: COMMERCIAL

## 2024-11-12 NOTE — TELEPHONE ENCOUNTER
Procedure: Bilateral neck exploration, excision of 2-1/2 hyperplastic parathyroid glands     Date: 11/5/24    Surgeon: Lashanda    Patient reporting ongoing sore throat. She reports it is painful to swallow. Hurts more when swallowing liquids vs solids. Hard to take pills as they sometimes get stuck. In the evening when she goes to bed and has to clear her throat, she can't swallow what she is clearing.     Denies any shortness of breath or feelings that her throat is closing up.     She reports that most food is okay, but she has difficulties with all liquids    The front of her neck is also sore today. But, she is not concerned with this as she knows her incision is still healing.    Informed her that she is likely experiencing discomfort/irritation from the intubation tube. Suggest she try throat soothing measures (chloraseptic lozenges/spray, tea, ect). She is in agreement with this plan. She will call if symptoms persist or get worse.    Will also discuss with Dr. Pyle to determine if he would like to see her sooner for post op    Rosalind Anthony, RN-BSN

## 2024-11-12 NOTE — TELEPHONE ENCOUNTER
Patient had a bilateral neck exploration 11/5/24 MRG and is having some pain in her throat and trouble swallowing    Please call    Phone: [phone]   Message ok

## 2024-11-18 ENCOUNTER — THERAPY VISIT (OUTPATIENT)
Dept: PHYSICAL THERAPY | Facility: CLINIC | Age: 79
End: 2024-11-18
Payer: COMMERCIAL

## 2024-11-18 DIAGNOSIS — M54.9 BACK PAIN: Primary | ICD-10-CM

## 2024-11-18 PROCEDURE — 97110 THERAPEUTIC EXERCISES: CPT | Mod: GP

## 2024-11-18 NOTE — PROGRESS NOTES
DISCHARGE  Reason for Discharge: Patient endorses that back pain has resolved, goals met for this plan of care. Discussed obtaining referral for sciatic symptoms if continued. Patient verbalizes understanding of all education. Appropriate for discharge.     Equipment Issued: NA    Discharge Plan: Patient to continue home program.    Referring Provider:  Referred Self     11/18/24 0500   Appointment Info   Signing clinician's name / credentials Francie Allen, PT, DPT   Visits Used 3/10 [employer]   Medical Diagnosis Back pain   PT Tx Diagnosis Reduced thoracic mobility, reduced strength   Self Referred   Self Referred (PT) Yes   MD order needed by: 1/5/2025   Progress Note/Certification   Start of Care Date 10/07/24   Therapy Frequency 2x/month   Predicted Duration 2 more visits   Certification date from 10/07/24   Certification date to 01/04/25   Progress Note Completed Date 10/07/24   PT Goal 1   Goal Identifier Pain   Goal Description Patient willl be able to complete daily tasks with a pain remaining at or below 3/10   Rationale to maximize safety and independence with performance of ADLs and functional tasks;to maximize safety and independence within the home;to maximize safety and independence within the community   Target Date 01/04/25   Date Met 11/18/24   PT Goal 2   Goal Identifier HEP   Goal Description Patient will demonstrate IND with HEP for continuation of exercises at home.   Rationale to maximize safety and independence with performance of ADLs and functional tasks;to maximize safety and independence within the home;to maximize safety and independence within the community   Target Date 01/04/25   Date Met 11/18/24   Subjective Report   Subjective Report Feeling much better, no pain.   Therapeutic Procedure/Exercise   Therapeutic Procedures: strength, endurance, ROM, flexibility minutes (28002) 17   Ther Proc 1 Review HEP   Ther Proc 1 - Details Discussed HEP, added seated piriformis stretch  for continued management of sciatic symptoms. Patient notes that her back pain is resolved and we discussed continuing exercise if these symptoms come back.   Skilled Intervention patient education, HEP review   Patient Response/Progress Back pain resolved.   Education   Learner/Method Patient;Listening;Demonstration;Pictures/Video;No Barriers to Learning   Education Comments HEP progressions, pain management   Plan   Home program PTRx   Plan for next session NA   Comments   Comments Patient endorses that back pain has resolved, goals met for this plan of care. Discussed obtaining referral for sciatic symptoms if continued. Patient verbalizes understanding of all education.   Total Session Time   Timed Code Treatment Minutes 17   Total Treatment Time (sum of timed and untimed services) 17

## 2024-11-19 ENCOUNTER — TELEPHONE (OUTPATIENT)
Dept: SURGERY | Facility: CLINIC | Age: 79
End: 2024-11-19
Payer: COMMERCIAL

## 2024-11-19 NOTE — TELEPHONE ENCOUNTER
Patient had a bilateral neck exploration 11/5/24 MRG and is having trouble swallowing, it was painful but is not anymore just difficult to swallow     Please call    Phone: [phone]   Message ok

## 2024-11-19 NOTE — TELEPHONE ENCOUNTER
PROCEDURE:              Bilateral neck exploration, excision of 2-1/2 hyperplastic parathyroid glands    Date: 11/5/24    Surgeon: Lashanda    Patient calling to discuss concerns with swallowing. She reports that she has improved some when it comes to swallowing food and liquids. But, she is having a hard time swallowing pills. Especially gel based pills. She reports her calcium pill came right up this morning after she tried to take it    She is no longer experiencing pain, just ongoing issues with swallowing and wanting to make Dr. Pyle aware before her appointment with him this week    Informed her that it is a good sign that the pain has subsided and she is also able to swallow food and liquids better.    Will make sure Dr Pyle is aware of her concerns prior to her appointment on Thursday    Encouraged her to call PRN    She agreed    Rosalind Anthony, RN-BSN

## 2024-11-21 ENCOUNTER — LAB (OUTPATIENT)
Dept: LAB | Facility: CLINIC | Age: 79
End: 2024-11-21
Payer: COMMERCIAL

## 2024-11-21 DIAGNOSIS — E83.52 HYPERCALCEMIA: ICD-10-CM

## 2024-11-21 LAB
CALCIUM SERPL-MCNC: 9.4 MG/DL (ref 8.8–10.4)
PTH-INTACT SERPL-MCNC: 70 PG/ML (ref 15–65)

## 2024-11-21 PROCEDURE — 36415 COLL VENOUS BLD VENIPUNCTURE: CPT

## 2024-11-21 PROCEDURE — 83970 ASSAY OF PARATHORMONE: CPT

## 2024-11-21 PROCEDURE — 82310 ASSAY OF CALCIUM: CPT

## 2024-11-26 DIAGNOSIS — R13.10 DIFFICULTY SWALLOWING: Primary | ICD-10-CM

## 2024-12-14 DIAGNOSIS — I10 HYPERTENSION GOAL BP (BLOOD PRESSURE) < 140/90: ICD-10-CM

## 2024-12-16 ENCOUNTER — TRANSFERRED RECORDS (OUTPATIENT)
Dept: HEALTH INFORMATION MANAGEMENT | Facility: CLINIC | Age: 79
End: 2024-12-16
Payer: COMMERCIAL

## 2024-12-16 RX ORDER — LOSARTAN POTASSIUM 100 MG/1
100 TABLET ORAL DAILY
Qty: 90 TABLET | Refills: 0 | Status: SHIPPED | OUTPATIENT
Start: 2024-12-16

## 2025-01-14 ENCOUNTER — VIRTUAL VISIT (OUTPATIENT)
Dept: ENDOCRINOLOGY | Facility: CLINIC | Age: 80
End: 2025-01-14
Payer: COMMERCIAL

## 2025-01-14 DIAGNOSIS — M81.0 OSTEOPOROSIS, UNSPECIFIED OSTEOPOROSIS TYPE, UNSPECIFIED PATHOLOGICAL FRACTURE PRESENCE: ICD-10-CM

## 2025-01-14 DIAGNOSIS — E21.3 HYPERPARATHYROIDISM: Primary | ICD-10-CM

## 2025-01-14 DIAGNOSIS — E83.52 HYPERCALCEMIA: ICD-10-CM
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
## 2025-02-12 ENCOUNTER — OFFICE VISIT (OUTPATIENT)
Dept: FAMILY MEDICINE | Facility: CLINIC | Age: 80
End: 2025-02-12
Payer: COMMERCIAL

## 2025-02-12 ENCOUNTER — LAB (OUTPATIENT)
Dept: LAB | Facility: CLINIC | Age: 80
End: 2025-02-12
Payer: COMMERCIAL

## 2025-02-12 VITALS
HEART RATE: 62 BPM | BODY MASS INDEX: 39.16 KG/M2 | HEIGHT: 56 IN | WEIGHT: 174.1 LBS | OXYGEN SATURATION: 98 % | RESPIRATION RATE: 15 BRPM | SYSTOLIC BLOOD PRESSURE: 136 MMHG | DIASTOLIC BLOOD PRESSURE: 84 MMHG | TEMPERATURE: 98.2 F

## 2025-02-12 DIAGNOSIS — E83.52 HYPERCALCEMIA: ICD-10-CM

## 2025-02-12 DIAGNOSIS — C50.919 INFILTRATING DUCTAL CARCINOMA OF BREAST, STAGE 1, UNSPECIFIED LATERALITY (H): ICD-10-CM

## 2025-02-12 DIAGNOSIS — E21.3 HYPERPARATHYROIDISM: ICD-10-CM

## 2025-02-12 DIAGNOSIS — E66.01 MORBID OBESITY DUE TO EXCESS CALORIES (H): ICD-10-CM

## 2025-02-12 DIAGNOSIS — G51.0 BELL'S PALSY: Primary | ICD-10-CM

## 2025-02-12 LAB
ALBUMIN SERPL BCG-MCNC: 4.3 G/DL (ref 3.5–5.2)
CALCIUM SERPL-MCNC: 9.5 MG/DL (ref 8.8–10.4)
PTH-INTACT SERPL-MCNC: 60 PG/ML (ref 15–65)

## 2025-02-12 PROCEDURE — 82310 ASSAY OF CALCIUM: CPT

## 2025-02-12 PROCEDURE — 99214 OFFICE O/P EST MOD 30 MIN: CPT | Performed by: FAMILY MEDICINE

## 2025-02-12 PROCEDURE — 36415 COLL VENOUS BLD VENIPUNCTURE: CPT

## 2025-02-12 PROCEDURE — 83970 ASSAY OF PARATHORMONE: CPT

## 2025-02-12 PROCEDURE — G2211 COMPLEX E/M VISIT ADD ON: HCPCS | Performed by: FAMILY MEDICINE

## 2025-02-12 PROCEDURE — 82040 ASSAY OF SERUM ALBUMIN: CPT

## 2025-02-12 NOTE — PROGRESS NOTES
"  Assessment & Plan     Bell's palsy  Thankfully, is improving and reports about 50% better.  She did complete the course of prednisone and valacyclovir.  Will continue to monitor for improvement but encouraged follow-up with clinic if symptoms do not resolve after 3 months or if new neurologic deficits occur.  Had some mild transient balance issues that have fully resolved.  Her neurologic exam is unremarkable today outside of the Bell's palsy.  Encouraged her to follow-up with ophthalmology within the next week given she had some eye irritation last night.  She has an ophthalmologist that she follows with chronically that she can call.  I do wonder if she is not getting enough lubrication to that eye.  We did discuss brain MRI if symptoms do not continue to fully resolve or new neurologic symptoms occur.    Infiltrating ductal carcinoma of breast, stage 1, unspecified laterality (H)  History of, has been in remission    Morbid obesity due to excess calories (H)  Being monitored by her primary physician.  She has a physical upcoming in May        MED REC REQUIRED  Post Medication Reconciliation Status:  Discharge medications reconciled and changed, see notes/orders  BMI  Estimated body mass index is 39.72 kg/m  as calculated from the following:    Height as of this encounter: 1.41 m (4' 7.51\").    Weight as of this encounter: 79 kg (174 lb 1.6 oz).         Mahad Anderson is a 79 year old, presenting for the following health issues:  Hospital F/U        2/12/2025     4:05 PM   Additional Questions   Roomed by Nicole   Accompanied by      HPI       ED/UC Followup:    Facility:  [facility]   Date of visit: 1/31/2025  Reason for visit: Bell's Palsy   Current Status: Eyes are sometimes painful and yesterday had pain in jaw       Was seen January 31, 2025 at [facility] emergency room for right facial droop, and diagnosed with acute Bell's palsy.  There was no signs to suggest stroke " "or intracranial mass.  She was started on 7-day course of prednisone and valacyclovir    Ordered Prescriptions  Prescription Sig Dispense Quantity Refills Last Filled Start Date End Date   valACYclovir (VALTREX) 1 gram oral tablet  Take 1 tablet (1,000 mg) by mouth three times a day for 7 days. 21 tablet      01/31/2025 02/07/2025   predniSONE (DELTASONE) 20 mg oral tablet  Take 3 tablets (60 mg) by mouth once daily for 6 days. 18 tablet            Noting less drooping of her face.  Feels about 50% better.     Last evening was noting her right eye was hurting while looking at the screen. Used some eye drops at home for this (abx )   Also right jaw was bothering her.  Thinks it could have been periodontic disease.  Today not noting any of that.   Feel like closing eye better than she had been.   Lubricating eye daily during the day (not consistent) and taping her eye at night    No new dacosta  Felt balance was a little off yesterday and the day prior. Today resolved.   No weakness in arms or legs. Was able to exercise/foam roaling  Vision is good   No sensory changes  Taste is baseline. Geographic tongue (baseline)    Has infected/inflamed gum, right lower side. Working with periodontist.     Parathyroid surgery this last year.           Objective    /84 (BP Location: Right arm, Patient Position: Sitting, Cuff Size: Adult Large)   Pulse 62   Temp 98.2  F (36.8  C) (Oral)   Resp 15   Ht 1.41 m (4' 7.51\")   Wt 79 kg (174 lb 1.6 oz)   LMP  (LMP Unknown)   SpO2 98%   BMI 39.72 kg/m    Body mass index is 39.72 kg/m .  Physical Exam   GENERAL: alert and no distress  Eyes: Minimal conjunctival injection bilateral, pupils equal round reactive to light  Mouth: Geographic tongue  NECK: no adenopathy, no asymmetry, masses, or scars  RESP: lungs clear to auscultation - no rales, rhonchi or wheezes  CV: regular rate and rhythm, normal S1 S2, no S3 or S4, no murmur, click or rub, no peripheral edema  MS: no gross " musculoskeletal defects noted, no edema  NEURO: Normal strength and tone, sensory exam grossly normal, mentation intact, speech normal, and right facial droop including forehead present  PSYCH: mentation appears normal, affect normal/bright            Signed Electronically by: Sona Cadena MD

## 2025-02-28 ENCOUNTER — TRANSFERRED RECORDS (OUTPATIENT)
Dept: HEALTH INFORMATION MANAGEMENT | Facility: CLINIC | Age: 80
End: 2025-02-28
Payer: COMMERCIAL

## 2025-03-13 DIAGNOSIS — I10 HYPERTENSION GOAL BP (BLOOD PRESSURE) < 140/90: ICD-10-CM

## 2025-03-13 RX ORDER — LOSARTAN POTASSIUM 100 MG/1
100 TABLET ORAL DAILY
Qty: 90 TABLET | Refills: 0 | Status: SHIPPED | OUTPATIENT
Start: 2025-03-13

## 2025-05-09 ENCOUNTER — DOCUMENTATION ONLY (OUTPATIENT)
Dept: LAB | Facility: CLINIC | Age: 80
End: 2025-05-09
Payer: COMMERCIAL

## 2025-05-09 DIAGNOSIS — E78.5 HYPERLIPIDEMIA LDL GOAL <100: ICD-10-CM

## 2025-05-09 DIAGNOSIS — Z13.1 SCREENING FOR DIABETES MELLITUS: ICD-10-CM

## 2025-05-09 DIAGNOSIS — Z13.6 SCREENING FOR CARDIOVASCULAR CONDITION: ICD-10-CM

## 2025-05-09 DIAGNOSIS — I10 ESSENTIAL HYPERTENSION WITH GOAL BLOOD PRESSURE LESS THAN 140/90: Primary | ICD-10-CM

## 2025-05-09 NOTE — PROGRESS NOTES
Monica Samayoa has an upcoming lab appointment and is eligible to have due Health Maintenance labs drawn per Health Maintenance Protocol Orders (HMPO) process.    Before it can be drawn, a diagnosis is needed for the following lab: Glucose    Please review and enter diagnosis as appropriate.     Coni Casanova MLT

## 2025-05-10 DIAGNOSIS — E78.5 HYPERLIPIDEMIA LDL GOAL <100: ICD-10-CM

## 2025-05-12 RX ORDER — SIMVASTATIN 40 MG
40 TABLET ORAL AT BEDTIME
Qty: 90 TABLET | Refills: 0 | Status: SHIPPED | OUTPATIENT
Start: 2025-05-12

## 2025-05-20 ENCOUNTER — LAB (OUTPATIENT)
Dept: LAB | Facility: CLINIC | Age: 80
End: 2025-05-20
Payer: COMMERCIAL

## 2025-05-20 DIAGNOSIS — E21.3 HYPERPARATHYROIDISM: ICD-10-CM

## 2025-05-20 DIAGNOSIS — E83.52 HYPERCALCEMIA: ICD-10-CM

## 2025-05-20 LAB
ALBUMIN SERPL BCG-MCNC: 4.6 G/DL (ref 3.5–5.2)
CALCIUM SERPL-MCNC: 10 MG/DL (ref 8.8–10.4)
PTH-INTACT SERPL-MCNC: 39 PG/ML (ref 15–65)

## 2025-05-20 PROCEDURE — 36415 COLL VENOUS BLD VENIPUNCTURE: CPT

## 2025-05-20 PROCEDURE — 82310 ASSAY OF CALCIUM: CPT

## 2025-05-20 PROCEDURE — 82040 ASSAY OF SERUM ALBUMIN: CPT

## 2025-05-20 PROCEDURE — 83970 ASSAY OF PARATHORMONE: CPT

## 2025-05-29 ENCOUNTER — RESULTS FOLLOW-UP (OUTPATIENT)
Dept: ENDOCRINOLOGY | Facility: CLINIC | Age: 80
End: 2025-05-29

## 2025-05-30 ENCOUNTER — RESULTS FOLLOW-UP (OUTPATIENT)
Dept: FAMILY MEDICINE | Facility: CLINIC | Age: 80
End: 2025-05-30

## 2025-05-30 NOTE — RESULT ENCOUNTER NOTE
Your cholesterol indicates very good control with current treatment.  Refill of simvastatin will be updated today.  Blood sugar, kidney function, and liver testing are all normal.  Thyroid testing is normal.  Please call or MyChart message me if you have any questions.  JESSK

## 2025-05-30 NOTE — RESULT ENCOUNTER NOTE
These results are all normal.  Your urine testing is normal.  There is no elevated protein present.  Cholesterol is under very good control with current treatment.  Continue current simvastatin recommended.  Blood sugar, kidney function, liver testing are all normal.  Thyroid testing is normal.  Please call or MyChart message me if you have any questions.  PSK

## 2025-06-05 ENCOUNTER — THERAPY VISIT (OUTPATIENT)
Dept: PHYSICAL THERAPY | Facility: CLINIC | Age: 80
End: 2025-06-05
Attending: FAMILY MEDICINE
Payer: COMMERCIAL

## 2025-06-05 DIAGNOSIS — M54.41 ACUTE MIDLINE LOW BACK PAIN WITH RIGHT-SIDED SCIATICA: ICD-10-CM

## 2025-06-05 NOTE — PROGRESS NOTES
PHYSICAL THERAPY EVALUATION  Type of Visit: Evaluation       Fall Risk Screen:  Have you fallen 2 or more times in the past year?: No  Have you fallen and had an injury in the past year?: No    Subjective         Presenting condition or subjective complaint: Sciatic nerve pain travels from my glutes muscle down to my sole  Pt reports recurrent R sciatic symptoms for a number of years. More recently, it has bothered more in the past six months. Mentioned it at annual physical on 5/29/25 and referred to PT.     Date of onset: 05/29/25 (MD visit)    Relevant medical history: Cancer; High blood pressure; Menopause; Overweight; Thyroid problems   Dates & types of surgery: November 2010 breast lumpectomy, November 2024 parathyroid    Prior diagnostic imaging/testing results:       Prior therapy history for the same diagnosis, illness or injury: No      Prior Level of Function  Transfers:   Ambulation:   ADL:   IADL:     Living Environment  Social support: With a significant other or spouse   Type of home: House   Stairs to enter the home: No       Ramp: No   Stairs inside the home: Yes 25 Is there a railing: Yes     Help at home: None; Home and Yard maintenance tasks  Equipment owned:       Employment: No    Hobbies/Interests: Reading    Patient goals for therapy: Walk freely any place    Pain assessment: See objective evaluation for additional pain details     Objective   LUMBAR SPINE EVALUATION  PAIN: Pain Level at Rest: 5/10  Pain Level with Use: 8/10  Pain Location: R buttock, then intermittently it can travel down to sole of R foot  Pain Quality: Sharp  Pain Frequency: intermittent  Pain is Worst: sometimes worse in bed  Pain is Exacerbated By: walking in store (needs to use cart), trying to do some hip stretches,   Pain is Relieved By: maybe walking (not sure if it just takes her mind off of it), doesn't feel it sitting in easy chair (recliner), sitting in dining room chairs  Pain Progression:  Unchanged  INTEGUMENTARY (edema, incisions):   POSTURE: Sitting Posture: Thoracic kyphosis increased  GAIT:   Weightbearing Status:   Assistive Device(s):   Gait Deviations:   BALANCE/PROPRIOCEPTION:   WEIGHTBEARING ALIGNMENT: Lumbar/Pelvic WB Alignment:Lordosis decreased, Lateral shift L, Convex scoliosis R, major inc thoracic kyphosis  Pt was not aware of her shifted position, does not know how long it's been there or if it came on with onset of pain six months ago  NON-WEIGHTBEARING ALIGNMENT:    ROM:   (Degrees) Left AROM Left PROM  Right AROM Right PROM   Hip Flexion       Hip Extension       Hip Abduction       Hip Adduction       Hip Internal Rotation       Hip External Rotation       Knee Flexion       Knee Extension       Lumbar Side glide Nil with inc R buttock pain Nil to min loss with dec R buttock pain   Lumbar Flexion nil   Lumbar Extension Nil to min loss with dec R buttock pain   Pain:   End feel:     Symptomatic response Mechanical response    During testing After testing Effect - increased ROM, decreased ROM, or key functional test No Effect   Pretest symptoms standin/10 R buttock pain     Rep FIS       Rep EIS peripheralizes peripheralized NE      Pretest symptoms lying:     During testing After testing Effect - increased ROM, decreased ROM, or key functional test No Effect   Rep CHRISTOPHER       Rep EIL         If required, pretest symptoms: pain in R shin (after EIS)   During testing After testing Effect - increased ROM, decreased ROM, or key functional test No Effect   Rep SGIS - R    Rep SGIS-R in slight flexion Increases      centralizing No Worse      Centralized to R anterior hip     NE    Rep SGIS - L           PELVIC/SI SCREEN:   STRENGTH:     MYOTOMES:    Left Right   T12-L3 (Hip Flexion) - (none) 5-   L2-4 (Quads)  5 5   L4 (Ankle DF) 5 5   L5 (Great Toe Ext) 5 5   S1 (Toe Raise) performed bilaterally 5 5 brought on R shin pain     DTR S:    Left Right   C5 (Biceps)     C6  (Brachioradialis)     C7 (Triceps)     L4 (Quad) 2 2   S1 (Achilles) 1 1     CORD SIGNS:   DERMATOMES: WNL  NEURAL TENSION:   FLEXIBILITY:   LUMBAR/HIP Special Tests:    PELVIS/SI SPECIAL TESTS:   FUNCTIONAL TESTS:   PALPATION:   SPINAL SEGMENTAL CONCLUSIONS:       Assessment & Plan   CLINICAL IMPRESSIONS  Medical Diagnosis: Acute midline low back pain with right-sided sciatica    Treatment Diagnosis: R lumbar radiculopathy   Impression/Assessment: Patient is a 79 year old female with lumbar complaints.  The following significant findings have been identified: Pain, Decreased ROM/flexibility, Decreased joint mobility, Decreased strength, Decreased activity tolerance, and Impaired posture. These impairments interfere with their ability to perform self care tasks, recreational activities, household mobility, and community mobility as compared to previous level of function.     Clinical Decision Making (Complexity):  Clinical Presentation: Stable/Uncomplicated  Clinical Presentation Rationale: based on medical and personal factors listed in PT evaluation  Clinical Decision Making (Complexity): Low complexity    PLAN OF CARE  Treatment Interventions:  Interventions: Manual Therapy, Neuromuscular Re-education, Therapeutic Activity, Therapeutic Exercise    Long Term Goals     PT Goal 1  Goal Identifier: walking  Goal Description: Pt will be able to walk at grocery store for up to 30 minutes without having to lean on cart  Rationale: to maximize safety and independence with performance of ADLs and functional tasks;to maximize safety and independence within the community  Target Date: 08/28/25      Frequency of Treatment: 1x/wk  Duration of Treatment: 12 wks    Recommended Referrals to Other Professionals:   Education Assessment:        Risks and benefits of evaluation/treatment have been explained.   Patient/Family/caregiver agrees with Plan of Care.     Evaluation Time:     PT Eval, Low Complexity Minutes (83773): 18        Signing Clinician: Talib Mahoney, PT        [facility]                                                                                   OUTPATIENT PHYSICAL THERAPY      PLAN OF TREATMENT FOR OUTPATIENT REHABILITATION   Patient's Last Name, First Name, Monica Landry YOB: 1945   Provider's Name   [facility]   Medical Record No.  [id number]     Onset Date: 05/29/25 (MD visit)  Start of Care Date: 06/05/25     Medical Diagnosis:  Acute midline low back pain with right-sided sciatica      PT Treatment Diagnosis:  R lumbar radiculopathy Plan of Treatment  Frequency/Duration: 1x/wk/ 12 wks    Certification date from 06/05/25 to 08/28/25         See note for plan of treatment details and functional goals     Talib Mahoney, PT                         I CERTIFY THE NEED FOR THESE SERVICES FURNISHED UNDER        THIS PLAN OF TREATMENT AND WHILE UNDER MY CARE     (Physician attestation of this document indicates review and certification of the therapy plan).              Referring Provider:  Heather Adams    Initial Assessment  See Epic Evaluation- Start of Care Date: 06/05/25

## 2025-06-19 ENCOUNTER — THERAPY VISIT (OUTPATIENT)
Dept: PHYSICAL THERAPY | Facility: CLINIC | Age: 80
End: 2025-06-19
Payer: COMMERCIAL

## 2025-06-19 DIAGNOSIS — M54.41 ACUTE MIDLINE LOW BACK PAIN WITH RIGHT-SIDED SCIATICA: Primary | ICD-10-CM

## 2025-07-02 ENCOUNTER — THERAPY VISIT (OUTPATIENT)
Dept: PHYSICAL THERAPY | Facility: CLINIC | Age: 80
End: 2025-07-02
Payer: COMMERCIAL

## 2025-07-02 DIAGNOSIS — M54.41 ACUTE MIDLINE LOW BACK PAIN WITH RIGHT-SIDED SCIATICA: Primary | ICD-10-CM

## 2025-07-02 PROCEDURE — 97110 THERAPEUTIC EXERCISES: CPT | Mod: GP | Performed by: PHYSICAL THERAPIST

## 2025-07-30 ENCOUNTER — TRANSFERRED RECORDS (OUTPATIENT)
Dept: HEALTH INFORMATION MANAGEMENT | Facility: CLINIC | Age: 80
End: 2025-07-30
Payer: COMMERCIAL

## 2025-08-01 ENCOUNTER — TRANSFERRED RECORDS (OUTPATIENT)
Dept: HEALTH INFORMATION MANAGEMENT | Facility: CLINIC | Age: 80
End: 2025-08-01
Payer: COMMERCIAL

## 2025-08-06 PROBLEM — M54.41 ACUTE MIDLINE LOW BACK PAIN WITH RIGHT-SIDED SCIATICA: Status: RESOLVED | Noted: 2025-06-05 | Resolved: 2025-08-06

## 2025-08-14 ENCOUNTER — LAB (OUTPATIENT)
Dept: LAB | Facility: CLINIC | Age: 80
End: 2025-08-14
Payer: COMMERCIAL

## 2025-08-14 DIAGNOSIS — E21.3 HYPERPARATHYROIDISM: ICD-10-CM

## 2025-08-14 DIAGNOSIS — E83.52 HYPERCALCEMIA: ICD-10-CM

## (undated) DEVICE — LINEN TOWEL PACK X5 5464

## (undated) DEVICE — MANIFOLD NEPTUNE 4 PORT 700-20

## (undated) DEVICE — PACK MINOR SBA15MIFSE

## (undated) DEVICE — NIM PROBE PRASS STIMULATOR PROTECTED TIP 8225101

## (undated) DEVICE — SU VICRYL+ 3-0 27IN SH UND VCP416H

## (undated) DEVICE — DRAIN JACKSON PRATT RESERVOIR 100ML SU130-1305

## (undated) DEVICE — SYR 03ML LL W/O NDL 309657

## (undated) DEVICE — SU MONOCRYL 4-0 P-3 18" UND Y494G

## (undated) DEVICE — DECANTER VIAL 2006S

## (undated) DEVICE — NDL 25GA 1.5" 305127

## (undated) DEVICE — DRSG GAUZE 2X2" 8042

## (undated) DEVICE — SPONGE RAY-TEC 4X8" 7318

## (undated) DEVICE — PACK UNIVERSAL SPLIT 29131

## (undated) DEVICE — DRSG STERI STRIP 1/2X4" R1547

## (undated) DEVICE — DECANTER BAG 2002S

## (undated) DEVICE — BLADE KNIFE SURG 15 371115

## (undated) DEVICE — SYR EAR BULB 3OZ 0035830

## (undated) DEVICE — SOL WATER IRRIG 1000ML BOTTLE 2F7114

## (undated) DEVICE — SU TIE VICRYL+ 3-0 12X18IN VIO VCP104G

## (undated) DEVICE — PREP CHLORAPREP W/ORANGE TINT 10.5ML 930715

## (undated) DEVICE — DISSECTOR SONICISION 7 CURVED JAW 13CM SCD7A13

## (undated) DEVICE — GLOVE BIOGEL PI MICRO INDICATOR UNDERGLOVE SZ 7.5 48975

## (undated) DEVICE — ESU HOLSTER PLASTIC DISP E2400

## (undated) DEVICE — SU PROLENE 5-0 RB-2DA 30" 8710H

## (undated) DEVICE — NEEDLE HYPO MONOJECT STANDARD 22GA 1 1/2IN BLUE 1188822112

## (undated) DEVICE — DRSG TEGADERM 2 3/8X2 3/4" 1624W

## (undated) DEVICE — ESU GROUND PAD UNIVERSAL W/O CORD

## (undated) DEVICE — SOL NACL 0.9% IRRIG 1000ML BOTTLE 2F7124

## (undated) DEVICE — ESU PENCIL W/SMOKE EVAC NEPTUNE STRYKER 0703-046-000

## (undated) DEVICE — GLOVE BIOGEL PI MICRO SZ 7.5 48575

## (undated) DEVICE — DRSG STERI STRIP 1/4X3" R1541

## (undated) DEVICE — SU VICRYL 3-0 SH 27" UND J416H

## (undated) DEVICE — TUBE ENDOTRACHEAL NIM EMG 6.0MM 8229306

## (undated) DEVICE — DRAIN JACKSON PRATT 10FR ROUND SU130-1321

## (undated) RX ORDER — HYDROMORPHONE HCL IN WATER/PF 6 MG/30 ML
PATIENT CONTROLLED ANALGESIA SYRINGE INTRAVENOUS
Status: DISPENSED
Start: 2024-11-05

## (undated) RX ORDER — REMIFENTANIL HYDROCHLORIDE 1 MG/ML
INJECTION, POWDER, LYOPHILIZED, FOR SOLUTION INTRAVENOUS
Status: DISPENSED
Start: 2024-11-05

## (undated) RX ORDER — PROPOFOL 10 MG/ML
INJECTION, EMULSION INTRAVENOUS
Status: DISPENSED
Start: 2024-11-05

## (undated) RX ORDER — HYDROCODONE BITARTRATE AND ACETAMINOPHEN 5; 325 MG/1; MG/1
TABLET ORAL
Status: DISPENSED
Start: 2024-11-05

## (undated) RX ORDER — HYDROMORPHONE HYDROCHLORIDE 1 MG/ML
INJECTION, SOLUTION INTRAMUSCULAR; INTRAVENOUS; SUBCUTANEOUS
Status: DISPENSED
Start: 2024-11-05

## (undated) RX ORDER — ONDANSETRON 2 MG/ML
INJECTION INTRAMUSCULAR; INTRAVENOUS
Status: DISPENSED
Start: 2024-11-05

## (undated) RX ORDER — FENTANYL CITRATE 50 UG/ML
INJECTION, SOLUTION INTRAMUSCULAR; INTRAVENOUS
Status: DISPENSED
Start: 2024-11-05

## (undated) RX ORDER — EPHEDRINE SULFATE 50 MG/ML
INJECTION, SOLUTION INTRAMUSCULAR; INTRAVENOUS; SUBCUTANEOUS
Status: DISPENSED
Start: 2024-11-05

## (undated) RX ORDER — GLYCOPYRROLATE 0.2 MG/ML
INJECTION, SOLUTION INTRAMUSCULAR; INTRAVENOUS
Status: DISPENSED
Start: 2024-11-05